# Patient Record
Sex: FEMALE | Race: BLACK OR AFRICAN AMERICAN | Employment: UNEMPLOYED | URBAN - METROPOLITAN AREA
[De-identification: names, ages, dates, MRNs, and addresses within clinical notes are randomized per-mention and may not be internally consistent; named-entity substitution may affect disease eponyms.]

---

## 2017-06-13 ENCOUNTER — HOSPITAL ENCOUNTER (EMERGENCY)
Age: 52
Discharge: HOME | End: 2017-06-13
Attending: EMERGENCY MEDICINE
Payer: MEDICAID

## 2017-06-13 VITALS
TEMPERATURE: 98.3 F | WEIGHT: 201 LBS | DIASTOLIC BLOOD PRESSURE: 88 MMHG | RESPIRATION RATE: 14 BRPM | HEART RATE: 93 BPM | HEIGHT: 65 IN | SYSTOLIC BLOOD PRESSURE: 115 MMHG | OXYGEN SATURATION: 98 % | BODY MASS INDEX: 33.49 KG/M2

## 2017-06-13 DIAGNOSIS — R31.9 URINARY TRACT INFECTION WITH HEMATURIA, SITE UNSPECIFIED: Primary | ICD-10-CM

## 2017-06-13 DIAGNOSIS — N39.0 URINARY TRACT INFECTION WITH HEMATURIA, SITE UNSPECIFIED: Primary | ICD-10-CM

## 2017-06-13 LAB
APPEARANCE UR: ABNORMAL
BACTERIA URNS QL MICRO: ABNORMAL /HPF
BILIRUB UR QL CFM: NEGATIVE
CLUE CELLS VAG QL WET PREP: NORMAL
COLOR UR: ABNORMAL
EPITH CASTS URNS QL MICRO: ABNORMAL /LPF
GLUCOSE UR STRIP.AUTO-MCNC: NEGATIVE MG/DL
HGB UR QL STRIP: ABNORMAL
KETONES UR QL STRIP.AUTO: ABNORMAL MG/DL
KOH PREP SPEC: NORMAL
LEUKOCYTE ESTERASE UR QL STRIP.AUTO: ABNORMAL
NITRITE UR QL STRIP.AUTO: NEGATIVE
PH UR STRIP: 5 [PH] (ref 5–8)
PROT UR STRIP-MCNC: 30 MG/DL
RBC #/AREA URNS HPF: ABNORMAL /HPF (ref 0–5)
SERVICE CMNT-IMP: NORMAL
SP GR UR REFRACTOMETRY: 1.03 (ref 1–1.03)
T VAGINALIS VAG QL WET PREP: NORMAL
UA: UC IF INDICATED,UAUC: ABNORMAL
UROBILINOGEN UR QL STRIP.AUTO: 1 EU/DL (ref 0.2–1)
WBC URNS QL MICRO: ABNORMAL /HPF (ref 0–4)

## 2017-06-13 PROCEDURE — 74011250637 HC RX REV CODE- 250/637: Performed by: PHYSICIAN ASSISTANT

## 2017-06-13 PROCEDURE — 99284 EMERGENCY DEPT VISIT MOD MDM: CPT

## 2017-06-13 PROCEDURE — 87210 SMEAR WET MOUNT SALINE/INK: CPT | Performed by: PHYSICIAN ASSISTANT

## 2017-06-13 PROCEDURE — 81001 URINALYSIS AUTO W/SCOPE: CPT | Performed by: EMERGENCY MEDICINE

## 2017-06-13 PROCEDURE — 74011250636 HC RX REV CODE- 250/636: Performed by: PHYSICIAN ASSISTANT

## 2017-06-13 PROCEDURE — 96372 THER/PROPH/DIAG INJ SC/IM: CPT

## 2017-06-13 PROCEDURE — 87491 CHLMYD TRACH DNA AMP PROBE: CPT | Performed by: PHYSICIAN ASSISTANT

## 2017-06-13 PROCEDURE — 87086 URINE CULTURE/COLONY COUNT: CPT | Performed by: EMERGENCY MEDICINE

## 2017-06-13 PROCEDURE — 74011000250 HC RX REV CODE- 250: Performed by: PHYSICIAN ASSISTANT

## 2017-06-13 RX ORDER — CEPHALEXIN 500 MG/1
500 CAPSULE ORAL 2 TIMES DAILY
Qty: 14 CAP | Refills: 0 | Status: SHIPPED | OUTPATIENT
Start: 2017-06-13 | End: 2017-06-20

## 2017-06-13 RX ORDER — DIPHENHYDRAMINE HCL 25 MG
50 CAPSULE ORAL
Qty: 12 CAP | Refills: 0 | Status: SHIPPED | OUTPATIENT
Start: 2017-06-13 | End: 2018-08-31

## 2017-06-13 RX ORDER — DIPHENHYDRAMINE HCL 25 MG
25 CAPSULE ORAL
Status: COMPLETED | OUTPATIENT
Start: 2017-06-13 | End: 2017-06-13

## 2017-06-13 RX ORDER — AZITHROMYCIN 250 MG/1
1000 TABLET, FILM COATED ORAL
Status: COMPLETED | OUTPATIENT
Start: 2017-06-13 | End: 2017-06-13

## 2017-06-13 RX ADMIN — DIPHENHYDRAMINE HYDROCHLORIDE 25 MG: 25 CAPSULE ORAL at 10:47

## 2017-06-13 RX ADMIN — AZITHROMYCIN 1000 MG: 250 TABLET, FILM COATED ORAL at 10:23

## 2017-06-13 RX ADMIN — LIDOCAINE HYDROCHLORIDE 250 MG: 10 INJECTION, SOLUTION EPIDURAL; INFILTRATION; INTRACAUDAL; PERINEURAL at 10:23

## 2017-06-13 NOTE — ED NOTES
Assumed care of patient from triage. Patient alert and oriented x4. Patient reports lower abd pain with flank pain and vaginal discharge x3 days. Patient mentions she has had diarrhea x3 days with nausea. Patient also reports vaginal itching and urinary burning. Patient denies any other complaints at this time. Emergency Department Nursing Plan of Care       The Nursing Plan of Care is developed from the Nursing assessment and Emergency Department Attending provider initial evaluation. The plan of care may be reviewed in the ED Provider note.     The Plan of Care was developed with the following considerations:   Patient / Family readiness to learn indicated by:verbalized understanding  Persons(s) to be included in education: patient  Barriers to Learning/Limitations:No    Signed     Ajit Proctor RN    6/13/2017   9:24 AM

## 2017-06-13 NOTE — ED NOTES
Patient reports generalized itching. Denies any trouble breathing or swallowing. Provider aware. No rash or hives noted.

## 2017-06-13 NOTE — ED NOTES
Patient states \"Im a heroin addict\". States she has been trying to stop on her own and thinks symptoms are related.  Last use this AM.

## 2017-06-13 NOTE — ED PROVIDER NOTES
HPI Comments: Humble Dixon is a 47 y.o. female w/ hx significant for heroin abuse, bipolar disorder, depression, arthritis, and acid reflux who presents ambulatory to the ED c/o Rt flank pain and suprapubic pain x 2 days w/ dysuria, brown vaginal discharge, chills, diarrhea and nausea. Pt additionally endorses attempting to quit heroin x 4 days but last used this morning after 4 days of being clean. Pt states she has been trying to get in to a program but it is taking a while. Pt additionally endorses typically using heroin every day. Pt specifically denies current nausea, vomiting, fevers, vaginal bleeding, SOB, chest pain. PCP: Omar Saunders MD    There are no other complaints, changes or physical findings at this time. Patient is a 47 y.o. female presenting with flank pain. The history is provided by the patient. Flank Pain    This is a new problem. The current episode started 2 days ago. The problem has not changed since onset. The problem occurs constantly. Patient reports not work related injury. The pain is associated with no known injury. The pain is present in the right side. The pain radiates to the right thigh. The pain is at a severity of 10/10. Associated symptoms include abdominal pain and dysuria. Pertinent negatives include no chest pain, no fever, no numbness, no weight loss, no headaches, no abdominal swelling, no bowel incontinence, no perianal numbness, no bladder incontinence, no pelvic pain, no leg pain, no paresthesias, no paresis, no tingling and no weakness. She has tried nothing for the symptoms.         Past Medical History:   Diagnosis Date    Acid reflux     Arthritis     COPD     Depression     Other ill-defined conditions     Chronic Back Pain    Psychiatric disorder     bipolar       Past Surgical History:   Procedure Laterality Date    ABDOMEN SURGERY PROC UNLISTED      Cholecystectomy    HX CHOLECYSTECTOMY      HX GYN      oophorectomy, left    HX ORTHOPAEDIC      Fracture repair/knee         History reviewed. No pertinent family history. Social History     Social History    Marital status:      Spouse name: N/A    Number of children: N/A    Years of education: N/A     Occupational History    Not on file. Social History Main Topics    Smoking status: Current Every Day Smoker     Packs/day: 0.50     Types: Cigarettes     Last attempt to quit: 7/4/2014    Smokeless tobacco: Never Used    Alcohol use Yes      Comment: socially once a month    Drug use: Yes     Special: Heroin      Comment: 1x a day    Sexual activity: Not on file     Other Topics Concern    Not on file     Social History Narrative         ALLERGIES: Latex and Sulfa (sulfonamide antibiotics)    Review of Systems   Constitutional: Positive for chills. Negative for activity change, fatigue, fever and weight loss. HENT: Negative. Respiratory: Negative. Negative for cough and shortness of breath. Cardiovascular: Negative. Negative for chest pain. Gastrointestinal: Positive for abdominal pain and nausea (not currently. ). Negative for bowel incontinence, constipation, diarrhea and vomiting. Genitourinary: Positive for dysuria, flank pain and vaginal discharge. Negative for bladder incontinence, difficulty urinating, frequency, hematuria, pelvic pain, urgency, vaginal bleeding and vaginal pain. Musculoskeletal: Positive for arthralgias (Rt hip pain. ). Negative for back pain and joint swelling. Skin: Negative. Negative for rash. Neurological: Negative. Negative for tingling, syncope, weakness, numbness, headaches and paresthesias. Psychiatric/Behavioral: Negative. Vitals:    06/13/17 0907   BP: 115/88   Pulse: 93   Resp: 14   Temp: 98.3 °F (36.8 °C)   SpO2: 97%   Weight: 91.2 kg (201 lb)   Height: 5' 5\" (1.651 m)            Physical Exam   Constitutional: She is oriented to person, place, and time. She appears well-developed and well-nourished.  No distress. HENT:   Head: Normocephalic and atraumatic. Right Ear: Hearing and external ear normal.   Left Ear: Hearing and external ear normal.   Nose: Nose normal.   Eyes: Conjunctivae and EOM are normal. Pupils are equal, round, and reactive to light. Neck: Normal range of motion. Cardiovascular: Normal rate, regular rhythm, normal heart sounds and intact distal pulses. Pulmonary/Chest: Effort normal and breath sounds normal. No respiratory distress. She has no wheezes. She has no rales. She exhibits no tenderness. Abdominal: Soft. Bowel sounds are normal. There is no tenderness. There is no rigidity, no rebound, no guarding, no CVA tenderness, no tenderness at McBurney's point and negative Izquierdo's sign. Hernia confirmed negative in the right inguinal area and confirmed negative in the left inguinal area. Genitourinary: Uterus normal. Pelvic exam was performed with patient supine. No labial fusion. There is no rash, tenderness, lesion or injury on the right labia. There is no rash, tenderness, lesion or injury on the left labia. Cervix exhibits no motion tenderness, no discharge and no friability. Right adnexum displays no mass, no tenderness and no fullness. Left adnexum displays no mass, no tenderness and no fullness. No erythema, tenderness or bleeding in the vagina. No foreign body in the vagina. No signs of injury around the vagina. Vaginal discharge found. Musculoskeletal: Normal range of motion. Lymphadenopathy:        Right: No inguinal adenopathy present. Left: No inguinal adenopathy present. Neurological: She is alert and oriented to person, place, and time. No cranial nerve deficit. Skin: Skin is warm and dry. She is not diaphoretic. Psychiatric: She has a normal mood and affect. Her behavior is normal. Judgment and thought content normal.   Nursing note and vitals reviewed.        MDM  Number of Diagnoses or Management Options  Urinary tract infection with hematuria, site unspecified:   Diagnosis management comments: DDx: UTI, pyelo, nephrolithiasis, STI/ PID, BV, vaginal candidiasis, heroin abuse/ withdrawal    LABORATORY TESTS:  Recent Results (from the past 12 hour(s))  -URINALYSIS W/ REFLEX CULTURE  Collection Time: 06/13/17  9:31 AM       Result                                            Value                         Ref Range                       Color                                             DARK YELLOW                                                   Appearance                                        CLOUDY (A)                    CLEAR                           Specific gravity                                  1.030                         1.003 - 1.030                   pH (UA)                                           5.0                           5.0 - 8.0                       Protein                                           30 (A)                        NEG mg/dL                       Glucose                                           NEGATIVE                      NEG mg/dL                       Ketone                                            TRACE (A)                     NEG mg/dL                       Blood                                             MODERATE (A)                  NEG                             Urobilinogen                                      1.0                           0.2 - 1.0 EU/dL                 Nitrites                                          NEGATIVE                      NEG                             Leukocyte Esterase                                MODERATE (A)                  NEG                             WBC                                               10-20                         0 - 4 /hpf                      RBC                                               5-10                          0 - 5 /hpf                      Epithelial cells                                  MODERATE (A)                  FEW /lpf Bacteria                                          1+ (A)                        NEG /hpf                        UA:UC IF INDICATED                                URINE CULTURE ORDERED (A)     CNI                        -BILIRUBIN, CONFIRM  Collection Time: 06/13/17  9:31 AM       Result                                            Value                         Ref Range                       Bilirubin UA, confirm                             NEGATIVE                      NEG                        -WET PREP  Collection Time: 06/13/17 10:15 AM       Result                                            Value                         Ref Range                       Clue cells                                        CLUE CELLS ABSENT                                             Wet prep                                          NO TRICHOMONAS SEEN                                      -KOH, OTHER SOURCES  Collection Time: 06/13/17 10:15 AM       Result                                            Value                         Ref Range                       Special Requests:                                 NO SPECIAL REQUESTS                                           LEIF                                               NO YEAST SEEN                                              IMAGING RESULTS:  No orders to display    MEDICATIONS GIVEN:  Medications  diphenhydrAMINE (BENADRYL) capsule 25 mg (not administered)  azithromycin (ZITHROMAX) tablet 1,000 mg (1,000 mg Oral Given 6/13/17 1023)  cefTRIAXone (ROCEPHIN) 250 mg in lidocaine (PF) (XYLOCAINE) 10 mg/mL (1 %) IM injection (250 mg IntraMUSCular Given 6/13/17 1023)    IMPRESSION:  Urinary tract infection with hematuria, site unspecified  (primary encounter diagnosis)    PLAN:  1. Current Discharge Medication List    START taking these medications    diphenhydrAMINE (BENADRYL) 25 mg capsule  Take 2 Caps by mouth every six (6) hours as needed.   Qty: 12 Cap Refills: 0    cephALEXin (KEFLEX) 500 mg capsule  Take 1 Cap by mouth two (2) times a day for 7 days. Qty: 14 Cap Refills: 0      CONTINUE these medications which have NOT CHANGED    TRAZODONE HCL (TRAZODONE PO)  Take  by mouth. sertraline (ZOLOFT) 100 mg tablet  Take  by mouth daily. meloxicam (MOBIC) 15 mg tablet  Take 15 mg by mouth daily. pantoprazole (PROTONIX) 40 mg tablet  Take 40 mg by mouth daily. benztropine (COGENTIN) 0.5 mg tablet  Take 0.5 mg by mouth two (2) times a day. pravastatin (PRAVACHOL) 10 mg tablet  Take 10 mg by mouth nightly. ALPRAZolam (XANAX) 1 mg tablet  Take 1 mg by mouth. Cholecalciferol, Vitamin D3, 50,000 unit cap  Take  by mouth.    zolpidem (AMBIEN) 5 mg tablet  Take 5 mg by mouth nightly as needed for Sleep.    mirtazapine (REMERON) 15 mg tablet  Take 15 mg by mouth nightly. risperiDONE (RISPERDAL) 2 mg tablet  Take  by mouth. TIOTROPIUM BROMIDE (SPIRIVA WITH HANDIHALER IN)  Take  by inhalation. 2. Follow-up Information     Follow up With Details Comments Contact Nirali Gibbons MD Schedule an appointment as soon as possible for a   visit in 1 week As needed, If symptoms worsen [id number] [place] [address]  [phone]        Return to ED if worse                  Amount and/or Complexity of Data Reviewed  Clinical lab tests: ordered and reviewed  Tests in the medicine section of CPT®: ordered and reviewed    Patient Progress  Patient progress: stable    ED Course       Procedures    10:42 AM  I have discussed with patient their diagnosis, treatment, and follow up plan. The patient agrees to follow up as outlined in discharge paperwork and also to return to the ED with any worsening.  Missy Naylor PA-C

## 2017-06-13 NOTE — DISCHARGE INSTRUCTIONS
Urinary Tract Infection in Women: Care Instructions  Your Care Instructions    A urinary tract infection, or UTI, is a general term for an infection anywhere between the kidneys and the urethra (where urine comes out). Most UTIs are bladder infections. They often cause pain or burning when you urinate. UTIs are caused by bacteria and can be cured with antibiotics. Be sure to complete your treatment so that the infection goes away. Follow-up care is a key part of your treatment and safety. Be sure to make and go to all appointments, and call your doctor if you are having problems. It's also a good idea to know your test results and keep a list of the medicines you take. How can you care for yourself at home? · Take your antibiotics as directed. Do not stop taking them just because you feel better. You need to take the full course of antibiotics. · Drink extra water and other fluids for the next day or two. This may help wash out the bacteria that are causing the infection. (If you have kidney, heart, or liver disease and have to limit fluids, talk with your doctor before you increase your fluid intake.)  · Avoid drinks that are carbonated or have caffeine. They can irritate the bladder. · Urinate often. Try to empty your bladder each time. · To relieve pain, take a hot bath or lay a heating pad set on low over your lower belly or genital area. Never go to sleep with a heating pad in place. To prevent UTIs  · Drink plenty of water each day. This helps you urinate often, which clears bacteria from your system. (If you have kidney, heart, or liver disease and have to limit fluids, talk with your doctor before you increase your fluid intake.)  · Urinate when you need to. · Urinate right after you have sex. · Change sanitary pads often. · Avoid douches, bubble baths, feminine hygiene sprays, and other feminine hygiene products that have deodorants.   · After going to the bathroom, wipe from front to back.  When should you call for help? Call your doctor now or seek immediate medical care if:  · Symptoms such as fever, chills, nausea, or vomiting get worse or appear for the first time. · You have new pain in your back just below your rib cage. This is called flank pain. · There is new blood or pus in your urine. · You have any problems with your antibiotic medicine. Watch closely for changes in your health, and be sure to contact your doctor if:  · You are not getting better after taking an antibiotic for 2 days. · Your symptoms go away but then come back. Where can you learn more? Go to http://salvador-ricky.info/. Enter Q531 in the search box to learn more about \"Urinary Tract Infection in Women: Care Instructions. \"  Current as of: November 28, 2016  Content Version: 11.2  © 4163-1096 [employer], [employer]. Care instructions adapted under license by [employer] (which disclaims liability or warranty for this information). If you have questions about a medical condition or this instruction, always ask your healthcare professional. [address] any warranty or liability for your use of this information.

## 2017-06-14 LAB
BACTERIA SPEC CULT: NORMAL
C TRACH DNA SPEC QL NAA+PROBE: NEGATIVE
CC UR VC: NORMAL
N GONORRHOEA DNA SPEC QL NAA+PROBE: NEGATIVE
SAMPLE TYPE: NORMAL
SERVICE CMNT-IMP: NORMAL
SERVICE CMNT-IMP: NORMAL
SPECIMEN SOURCE: NORMAL

## 2017-09-17 ENCOUNTER — HOSPITAL ENCOUNTER (EMERGENCY)
Age: 52
Discharge: HOME | End: 2017-09-17
Attending: EMERGENCY MEDICINE
Payer: MEDICAID

## 2017-09-17 ENCOUNTER — APPOINTMENT (OUTPATIENT)
Dept: GENERAL RADIOLOGY | Age: 52
End: 2017-09-17
Attending: EMERGENCY MEDICINE
Payer: MEDICAID

## 2017-09-17 VITALS
BODY MASS INDEX: 34.99 KG/M2 | RESPIRATION RATE: 16 BRPM | TEMPERATURE: 97.8 F | WEIGHT: 210 LBS | OXYGEN SATURATION: 99 % | SYSTOLIC BLOOD PRESSURE: 131 MMHG | DIASTOLIC BLOOD PRESSURE: 80 MMHG | HEART RATE: 60 BPM | HEIGHT: 65 IN

## 2017-09-17 DIAGNOSIS — J42 CHRONIC BRONCHITIS, UNSPECIFIED CHRONIC BRONCHITIS TYPE (HCC): Primary | ICD-10-CM

## 2017-09-17 LAB
D DIMER PPP FEU-MCNC: 0.4 MG/L FEU (ref 0–0.65)
TROPONIN I BLD-MCNC: <0.04 NG/ML (ref 0–0.08)

## 2017-09-17 PROCEDURE — 99283 EMERGENCY DEPT VISIT LOW MDM: CPT

## 2017-09-17 PROCEDURE — 84484 ASSAY OF TROPONIN QUANT: CPT

## 2017-09-17 PROCEDURE — 71020 XR CHEST PA LAT: CPT

## 2017-09-17 PROCEDURE — 36415 COLL VENOUS BLD VENIPUNCTURE: CPT | Performed by: EMERGENCY MEDICINE

## 2017-09-17 PROCEDURE — 85379 FIBRIN DEGRADATION QUANT: CPT | Performed by: EMERGENCY MEDICINE

## 2017-09-17 PROCEDURE — 93005 ELECTROCARDIOGRAM TRACING: CPT

## 2017-09-17 RX ORDER — ALBUTEROL SULFATE 90 UG/1
2 AEROSOL, METERED RESPIRATORY (INHALATION)
Qty: 1 INHALER | Refills: 0 | Status: SHIPPED | OUTPATIENT
Start: 2017-09-17 | End: 2018-08-31

## 2017-09-17 RX ORDER — BENZONATATE 100 MG/1
100 CAPSULE ORAL
Qty: 30 CAP | Refills: 0 | Status: SHIPPED | OUTPATIENT
Start: 2017-09-17 | End: 2017-09-24

## 2017-09-17 RX ORDER — AZITHROMYCIN 250 MG/1
TABLET, FILM COATED ORAL
Qty: 6 TAB | Refills: 0 | Status: SHIPPED | OUTPATIENT
Start: 2017-09-17 | End: 2018-07-10 | Stop reason: CLARIF

## 2017-09-17 RX ORDER — PREDNISONE 20 MG/1
60 TABLET ORAL DAILY
Qty: 15 TAB | Refills: 0 | Status: SHIPPED | OUTPATIENT
Start: 2017-09-17 | End: 2017-09-22

## 2017-09-17 NOTE — ED NOTES
Pt accepted D/C data and med's. Pt decline W/C for D/C. PIV removed intact. Patient (s)  given copy of dc instructions and 4 script(s). Patient (s)  verbalized understanding of instructions and script (s). Patient given a current medication reconciliation form and verbalized understanding of their medications. Patient (s) verbalized understanding of the importance of discussing medications with  his or her physician or clinic they will be following up with. Patient alert and oriented and in no acute distress. Patient discharged home ambulatory with self.

## 2017-09-17 NOTE — DISCHARGE INSTRUCTIONS
Bronchitis: Care Instructions  Your Care Instructions    Bronchitis is inflammation of the bronchial tubes, which carry air to the lungs. The tubes swell and produce mucus, or phlegm. The mucus and inflamed bronchial tubes make you cough. You may have trouble breathing. Most cases of bronchitis are caused by viruses like those that cause colds. Antibiotics usually do not help and they may be harmful. Bronchitis usually develops rapidly and lasts about 2 to 3 weeks in otherwise healthy people. Follow-up care is a key part of your treatment and safety. Be sure to make and go to all appointments, and call your doctor if you are having problems. It's also a good idea to know your test results and keep a list of the medicines you take. How can you care for yourself at home? · Take all medicines exactly as prescribed. Call your doctor if you think you are having a problem with your medicine. · Get some extra rest.  · Take an over-the-counter pain medicine, such as acetaminophen (Tylenol), ibuprofen (Advil, Motrin), or naproxen (Aleve) to reduce fever and relieve body aches. Read and follow all instructions on the label. · Do not take two or more pain medicines at the same time unless the doctor told you to. Many pain medicines have acetaminophen, which is Tylenol. Too much acetaminophen (Tylenol) can be harmful. · Take an over-the-counter cough medicine that contains dextromethorphan to help quiet a dry, hacking cough so that you can sleep. Avoid cough medicines that have more than one active ingredient. Read and follow all instructions on the label. · Breathe moist air from a humidifier, hot shower, or sink filled with hot water. The heat and moisture will thin mucus so you can cough it out. · Do not smoke. Smoking can make bronchitis worse. If you need help quitting, talk to your doctor about stop-smoking programs and medicines. These can increase your chances of quitting for good.   When should you call for help? Call 911 anytime you think you may need emergency care. For example, call if:  · You have severe trouble breathing. Call your doctor now or seek immediate medical care if:  · You have new or worse trouble breathing. · You cough up dark brown or bloody mucus (sputum). · You have a new or higher fever. · You have a new rash. Watch closely for changes in your health, and be sure to contact your doctor if:  · You cough more deeply or more often, especially if you notice more mucus or a change in the color of your mucus. · You are not getting better as expected. Where can you learn more? Go to http://salvador-ricky.info/. Enter H333 in the search box to learn more about \"Bronchitis: Care Instructions. \"  Current as of: March 25, 2017  Content Version: 11.3  © 2113-9333 [employer]. Care instructions adapted under license by [employer] (which disclaims liability or warranty for this information). If you have questions about a medical condition or this instruction, always ask your healthcare professional. [address] any warranty or liability for your use of this information.

## 2017-09-17 NOTE — ED NOTES
Pt c/o CP and generalize body aches. x 3 days          Emergency Department Nursing Plan of Care       The Nursing Plan of Care is developed from the Nursing assessment and Emergency Department Attending provider initial evaluation. The plan of care may be reviewed in the ED Provider note.     The Plan of Care was developed with the following considerations:   Patient / Family readiness to learn indicated by:verbalized understanding  Persons(s) to be included in education: patient  Barriers to Learning/Limitations:No    Signed     Naina Lott RN    9/17/2017   11:47 AM

## 2017-09-17 NOTE — ED PROVIDER NOTES
HPI Comments: Ladarius Reinoso is a 47 y.o. female with PMHx significant for COPD, Arthritis, bipolar, depression who presents ambulatory to [facility] ED with cc of acute onset productive cough x 3 days with associated CP. Pt has had similar CP in the past with the cough and she has been seen by a physician in the past for the same complaint and her workups have been negative. Pt does note that her CP started after the onset of her cough. She does have an inhaler at home which she has used without relief. Pt specifically denies any SOB, fever, nausea, vomiting. PCP: Alisha Hensley MD    Social Hx: + tobacco (0.5 ppd), +EtOH (socially), + illicit drugs (heroin 1x a day). There are no other complaints, changes or physical findings at this time. The history is provided by the patient. No  was used. Past Medical History:   Diagnosis Date    Acid reflux     Arthritis     COPD     Depression     Other ill-defined conditions     Chronic Back Pain    Psychiatric disorder     bipolar       Past Surgical History:   Procedure Laterality Date    ABDOMEN SURGERY PROC UNLISTED      Cholecystectomy    HX CHOLECYSTECTOMY      HX GYN      oophorectomy, left    HX ORTHOPAEDIC      Fracture repair/knee         No family history on file. Social History     Social History    Marital status:      Spouse name: N/A    Number of children: N/A    Years of education: N/A     Occupational History    Not on file.      Social History Main Topics    Smoking status: Current Every Day Smoker     Packs/day: 0.50     Types: Cigarettes     Last attempt to quit: 7/4/2014    Smokeless tobacco: Never Used    Alcohol use Yes      Comment: socially once a month    Drug use: Yes     Special: Heroin      Comment: 1x a day    Sexual activity: Not on file     Other Topics Concern    Not on file     Social History Narrative         ALLERGIES: Latex and Sulfa (sulfonamide antibiotics)    Review of Systems   Constitutional: Negative for chills and fever. HENT: Negative for congestion, rhinorrhea, sneezing and sore throat. Eyes: Negative for redness and visual disturbance. Respiratory: Positive for cough. Negative for shortness of breath. Cardiovascular: Positive for chest pain. Negative for leg swelling. Gastrointestinal: Negative for abdominal pain, nausea and vomiting. Genitourinary: Negative for difficulty urinating and frequency. Musculoskeletal: Negative for back pain, myalgias and neck stiffness. Skin: Negative for rash. Neurological: Negative for dizziness, syncope, weakness and headaches. Hematological: Negative for adenopathy. Vitals:    09/17/17 1110   BP: 131/80   Pulse: 60   Resp: 16   Temp: 97.8 °F (36.6 °C)   SpO2: 99%   Weight: 95.3 kg (210 lb)   Height: 5' 5\" (1.651 m)            Physical Exam   Constitutional: She is oriented to person, place, and time. She appears well-developed and well-nourished. HENT:   Head: Normocephalic. Mouth/Throat: Oropharynx is clear and moist.   Eyes: Conjunctivae and EOM are normal. Pupils are equal, round, and reactive to light. Neck: Normal range of motion. Neck supple. Cardiovascular: Normal rate, regular rhythm, normal heart sounds and intact distal pulses. Pulmonary/Chest: Effort normal and breath sounds normal. No respiratory distress. She has no wheezes. She has no rales. Abdominal: Soft. Bowel sounds are normal. She exhibits no distension. There is no rebound. Musculoskeletal: Normal range of motion. She exhibits no edema or deformity. Neurological: She is alert and oriented to person, place, and time. Skin: Skin is warm and dry. Psychiatric: She has a normal mood and affect. Her behavior is normal. Judgment and thought content normal.        MDM  Number of Diagnoses or Management Options  Diagnosis management comments: DDx: URI, Bronchitis, Pneumonia.        Amount and/or Complexity of Data Reviewed  Clinical lab tests: ordered and reviewed  Tests in the radiology section of CPT®: ordered and reviewed  Tests in the medicine section of CPT®: ordered and reviewed  Review and summarize past medical records: yes  Independent visualization of images, tracings, or specimens: yes    Patient Progress  Patient progress: stable    ED Course       Procedures    PROGRESS NOTE:  11:18 AM  Will add a D-dimer and troponin since pt's previous workups were negative, will inform the pt. Pt updated on plan and she does report a DVT in the past  Written by BRITNEY Masterson, as dictated by Spring Muniz MD.    PROGRESS NOTE:  11:54 AM  Per chart review pt has the risk factors to have syncopal episodes per cardiology (Dr. Esther Ball) note on 4/20/17. Pt does have dilated cardiomyopathy and CHF. Written by BRITNEY Masterson, as dictated by Spring Muniz MD.    LABORATORY TESTS:  Recent Results (from the past 12 hour(s))   D DIMER    Collection Time: 09/17/17 11:48 AM   Result Value Ref Range    D-dimer 0.40 0.00 - 0.65 mg/L FEU   POC TROPONIN-I    Collection Time: 09/17/17 11:51 AM   Result Value Ref Range    Troponin-I (POC) <0.04 0.00 - 0.08 ng/mL       IMAGING RESULTS:  XR CHEST PA LAT   Final Result   CXR Results  (Last 48 hours)               09/17/17 1133  XR CHEST PA LAT Final result    Impression:  IMPRESSION:    1. Clear lungs. 2. If the patient meets criteria when she turns 55, annual lung carcinoma   screening with low-dose chest CT is recommended. Criteria for lung carcinoma screening with low-dose CT:   3 50-69 years old. Note: The ACR recommends screening to 68years old, though   the [place] Preventative Services Task Force recommends screening to [de-identified]years old. 2. No signs or symptoms of lung carcinoma. 3. 30 pack-year or greater smoking history. 4. Current smoker or quit within the last 15 years.    5. Written order from a health professional following lung cancer screening   counseling that attests to shared decision-making having taken place before   their first screening CT.       www.acr.org/Quality-Safety/Resources/LungRADS   www. USpreventativeservicestaskforce.org/page/document/updatesummaryfinal/lung-ca       cer-screening            Narrative:  PA AND LATERAL CHEST RADIOGRAPHS: 9/17/2017 11:33 AM       INDICATION: Cough, smoker. History of COPD. Chest pain and generalized bodyaches   for 3 days. Productive cough with nasal congestion. No fever. COMPARISON: 11/15/2016, 7/4/2016, 4/9/2006. TECHNIQUE: Frontal and lateral radiographs of the chest.       FINDINGS:   The lungs are clear. The central airways are patent. The heart size is normal.   No pneumothorax or pleural effusion. MEDICATIONS GIVEN:  Medications - No data to display    IMPRESSION:  1. Chronic bronchitis, unspecified chronic bronchitis type ([facility] [address]        PLAN:  1. Current Discharge Medication List      START taking these medications    Details   azithromycin (ZITHROMAX Z-ERIKA) 250 mg tablet Take two tablets today then one tablet daily  Qty: 6 Tab, Refills: 0      albuterol (PROVENTIL HFA, VENTOLIN HFA, PROAIR HFA) 90 mcg/actuation inhaler Take 2 Puffs by inhalation every four (4) hours as needed for Wheezing. Qty: 1 Inhaler, Refills: 0      predniSONE (DELTASONE) 20 mg tablet Take 3 Tabs by mouth daily for 5 days. Qty: 15 Tab, Refills: 0      benzonatate (TESSALON PERLES) 100 mg capsule Take 1 Cap by mouth three (3) times daily as needed for Cough for up to 7 days. Qty: 30 Cap, Refills: 0           2.    Follow-up Information     Follow up With Details Comments Contact Info    Lauryn Floyd MD   Patient can only remember the practice name and not the physician      [facility] EMERGENCY DEPT  As needed, If symptoms worsen [place]  [phone]        Return to ED if worse     DISCHARGE NOTE  12:42 PM  The patient has been re-evaluated and is ready for discharge. Reviewed available results with patient. Counseled pt on diagnosis and care plan. Pt has expressed understanding, and all questions have been answered. Pt agrees with plan and agrees to F/U as recommended, or return to the ED if their sxs worsen. Discharge instructions have been provided and explained to the pt, along with reasons to return to the ED. This note is prepared by Mindy De La Cruz acting as Scribe for MD Simone Green MD : The scribe's documentation has been prepared under my direction and personally reviewed by me in its entirety. I confirm that the note above accurately reflects all work, treatment, procedures, and medical decision making performed by me.

## 2017-09-17 NOTE — ED TRIAGE NOTES
C/o prod cough with nasal congestion, gen body aches, including chest soreness and back, x 3 days, denies fever/injury

## 2017-09-18 LAB
ATRIAL RATE: 56 BPM
CALCULATED P AXIS, ECG09: 58 DEGREES
CALCULATED R AXIS, ECG10: 4 DEGREES
CALCULATED T AXIS, ECG11: 5 DEGREES
DIAGNOSIS, 93000: NORMAL
P-R INTERVAL, ECG05: 118 MS
Q-T INTERVAL, ECG07: 416 MS
QRS DURATION, ECG06: 70 MS
QTC CALCULATION (BEZET), ECG08: 401 MS
VENTRICULAR RATE, ECG03: 56 BPM

## 2017-10-22 ENCOUNTER — HOSPITAL ENCOUNTER (EMERGENCY)
Age: 52
Discharge: HOME | End: 2017-10-22
Attending: EMERGENCY MEDICINE | Admitting: EMERGENCY MEDICINE
Payer: MEDICAID

## 2017-10-22 ENCOUNTER — APPOINTMENT (OUTPATIENT)
Dept: CT IMAGING | Age: 52
End: 2017-10-22
Attending: EMERGENCY MEDICINE
Payer: MEDICAID

## 2017-10-22 VITALS
RESPIRATION RATE: 14 BRPM | BODY MASS INDEX: 34.72 KG/M2 | WEIGHT: 208.4 LBS | HEIGHT: 65 IN | DIASTOLIC BLOOD PRESSURE: 77 MMHG | OXYGEN SATURATION: 99 % | HEART RATE: 83 BPM | SYSTOLIC BLOOD PRESSURE: 114 MMHG | TEMPERATURE: 98.4 F

## 2017-10-22 DIAGNOSIS — N20.1 LEFT URETERAL STONE: Primary | ICD-10-CM

## 2017-10-22 LAB
ALBUMIN SERPL-MCNC: 3.6 G/DL (ref 3.5–5)
ALBUMIN/GLOB SERPL: 1 {RATIO} (ref 1.1–2.2)
ALP SERPL-CCNC: 85 U/L (ref 45–117)
ALT SERPL-CCNC: 36 U/L (ref 12–78)
ANION GAP SERPL CALC-SCNC: 8 MMOL/L (ref 5–15)
APPEARANCE UR: ABNORMAL
AST SERPL-CCNC: 26 U/L (ref 15–37)
BACTERIA URNS QL MICRO: NEGATIVE /HPF
BASOPHILS # BLD: 0 K/UL (ref 0–0.1)
BASOPHILS NFR BLD: 0 % (ref 0–1)
BILIRUB SERPL-MCNC: 0.3 MG/DL (ref 0.2–1)
BILIRUB UR QL CFM: NEGATIVE
BUN SERPL-MCNC: 12 MG/DL (ref 6–20)
BUN/CREAT SERPL: 13 (ref 12–20)
CALCIUM SERPL-MCNC: 8.8 MG/DL (ref 8.5–10.1)
CHLORIDE SERPL-SCNC: 102 MMOL/L (ref 97–108)
CO2 SERPL-SCNC: 28 MMOL/L (ref 21–32)
COLOR UR: ABNORMAL
CREAT SERPL-MCNC: 0.91 MG/DL (ref 0.55–1.02)
EOSINOPHIL # BLD: 0.6 K/UL (ref 0–0.4)
EOSINOPHIL NFR BLD: 6 % (ref 0–7)
EPITH CASTS URNS QL MICRO: ABNORMAL /LPF
ERYTHROCYTE [DISTWIDTH] IN BLOOD BY AUTOMATED COUNT: 14.2 % (ref 11.5–14.5)
GLOBULIN SER CALC-MCNC: 3.7 G/DL (ref 2–4)
GLUCOSE SERPL-MCNC: 147 MG/DL (ref 65–100)
GLUCOSE UR STRIP.AUTO-MCNC: NEGATIVE MG/DL
HCT VFR BLD AUTO: 41.7 % (ref 35–47)
HGB BLD-MCNC: 13.5 G/DL (ref 11.5–16)
HGB UR QL STRIP: ABNORMAL
KETONES UR QL STRIP.AUTO: NEGATIVE MG/DL
LEUKOCYTE ESTERASE UR QL STRIP.AUTO: ABNORMAL
LYMPHOCYTES # BLD: 2.8 K/UL (ref 0.8–3.5)
LYMPHOCYTES NFR BLD: 29 % (ref 12–49)
MCH RBC QN AUTO: 31.2 PG (ref 26–34)
MCHC RBC AUTO-ENTMCNC: 32.4 G/DL (ref 30–36.5)
MCV RBC AUTO: 96.3 FL (ref 80–99)
MONOCYTES # BLD: 0.7 K/UL (ref 0–1)
MONOCYTES NFR BLD: 7 % (ref 5–13)
NEUTS SEG # BLD: 5.7 K/UL (ref 1.8–8)
NEUTS SEG NFR BLD: 58 % (ref 32–75)
NITRITE UR QL STRIP.AUTO: NEGATIVE
PH UR STRIP: 5.5 [PH] (ref 5–8)
PLATELET # BLD AUTO: 230 K/UL (ref 150–400)
POTASSIUM SERPL-SCNC: 3.5 MMOL/L (ref 3.5–5.1)
PROT SERPL-MCNC: 7.3 G/DL (ref 6.4–8.2)
PROT UR STRIP-MCNC: 30 MG/DL
RBC # BLD AUTO: 4.33 M/UL (ref 3.8–5.2)
RBC #/AREA URNS HPF: ABNORMAL /HPF (ref 0–5)
SODIUM SERPL-SCNC: 138 MMOL/L (ref 136–145)
SP GR UR REFRACTOMETRY: 1.02 (ref 1–1.03)
UA: UC IF INDICATED,UAUC: ABNORMAL
UROBILINOGEN UR QL STRIP.AUTO: 0.2 EU/DL (ref 0.2–1)
WBC # BLD AUTO: 9.8 K/UL (ref 3.6–11)
WBC URNS QL MICRO: ABNORMAL /HPF (ref 0–4)

## 2017-10-22 PROCEDURE — 81001 URINALYSIS AUTO W/SCOPE: CPT | Performed by: EMERGENCY MEDICINE

## 2017-10-22 PROCEDURE — 80053 COMPREHEN METABOLIC PANEL: CPT | Performed by: EMERGENCY MEDICINE

## 2017-10-22 PROCEDURE — 96361 HYDRATE IV INFUSION ADD-ON: CPT

## 2017-10-22 PROCEDURE — 99284 EMERGENCY DEPT VISIT MOD MDM: CPT

## 2017-10-22 PROCEDURE — 96374 THER/PROPH/DIAG INJ IV PUSH: CPT

## 2017-10-22 PROCEDURE — 74176 CT ABD & PELVIS W/O CONTRAST: CPT

## 2017-10-22 PROCEDURE — 85025 COMPLETE CBC W/AUTO DIFF WBC: CPT | Performed by: EMERGENCY MEDICINE

## 2017-10-22 PROCEDURE — 96375 TX/PRO/DX INJ NEW DRUG ADDON: CPT

## 2017-10-22 PROCEDURE — 74011250636 HC RX REV CODE- 250/636: Performed by: EMERGENCY MEDICINE

## 2017-10-22 PROCEDURE — 87086 URINE CULTURE/COLONY COUNT: CPT | Performed by: EMERGENCY MEDICINE

## 2017-10-22 PROCEDURE — 36415 COLL VENOUS BLD VENIPUNCTURE: CPT | Performed by: EMERGENCY MEDICINE

## 2017-10-22 RX ORDER — KETOROLAC TROMETHAMINE 30 MG/ML
30 INJECTION, SOLUTION INTRAMUSCULAR; INTRAVENOUS
Status: COMPLETED | OUTPATIENT
Start: 2017-10-22 | End: 2017-10-22

## 2017-10-22 RX ORDER — TAMSULOSIN HYDROCHLORIDE 0.4 MG/1
0.4 CAPSULE ORAL DAILY
Qty: 7 CAP | Refills: 0 | Status: SHIPPED | OUTPATIENT
Start: 2017-10-22 | End: 2017-10-29

## 2017-10-22 RX ORDER — ONDANSETRON 4 MG/1
4 TABLET, ORALLY DISINTEGRATING ORAL
Qty: 10 TAB | Refills: 0 | Status: SHIPPED | OUTPATIENT
Start: 2017-10-22 | End: 2018-08-15

## 2017-10-22 RX ORDER — MORPHINE SULFATE 2 MG/ML
2 INJECTION, SOLUTION INTRAMUSCULAR; INTRAVENOUS
Status: COMPLETED | OUTPATIENT
Start: 2017-10-22 | End: 2017-10-22

## 2017-10-22 RX ORDER — HYDROCODONE BITARTRATE AND ACETAMINOPHEN 5; 325 MG/1; MG/1
1 TABLET ORAL
Qty: 4 TAB | Refills: 0 | Status: SHIPPED | OUTPATIENT
Start: 2017-10-22 | End: 2018-07-10

## 2017-10-22 RX ADMIN — MORPHINE SULFATE 2 MG: 2 INJECTION, SOLUTION INTRAMUSCULAR; INTRAVENOUS at 06:53

## 2017-10-22 RX ADMIN — KETOROLAC TROMETHAMINE 30 MG: 30 INJECTION, SOLUTION INTRAMUSCULAR at 06:53

## 2017-10-22 RX ADMIN — SODIUM CHLORIDE 1000 ML: 900 INJECTION, SOLUTION INTRAVENOUS at 07:01

## 2017-10-22 NOTE — ED NOTES
Pt requesting food-asked to wait until CT scan results return. Pt reported that her abd pain had resolved. No si/s of acute distress. No pt complaints. Call bell within reach.

## 2017-10-22 NOTE — ED NOTES
Pt presents to ED via EMS complaining of left sided abdominal pain x 1 hour. Pt describes the pain as \"labor pains\" stating \"I feel like I'm having a baby. \" Pt reports using heroin \"earlier this evening. \" Pt denies taking anything at home to help the pain. Pt reports some diarrhea earlier this morning. Pt is alert and oriented x 4, RR even and unlabored, skin is warm and dry. Assessment completed and pt updated on plan of care. Emergency Department Nursing Plan of Care       The Nursing Plan of Care is developed from the Nursing assessment and Emergency Department Attending provider initial evaluation. The plan of care may be reviewed in the ED Provider note.     The Plan of Care was developed with the following considerations:   Patient / Family readiness to learn indicated by:verbalized understanding  Persons(s) to be included in education: patient  Barriers to Learning/Limitations:Dana Potter [id number]    10/22/2017   5:57 AM

## 2017-10-22 NOTE — ED PROVIDER NOTES
[facility]  EMERGENCY DEPARTMENT HISTORY AND PHYSICAL EXAM         Date of Service: 10/22/2017   Patient Name: Idris Bhagat   YOB: 1965  Medical Record Number: [id number]    History of Presenting Illness     Chief Complaint   Patient presents with    Abdominal Pain     left sided abd pain x 1 hour        History Provided By:  patient    Additional History:   Idris Bhagat is a 47 y.o. female with PMhx significant for heroin abuse, COPD, Arthritis, bipolar, depression who presents via EMS to the ED with cc of sudden onset LLQ abdominal pain that woke pt from her sleep at 0500 this morning. Pt describes her pain as sharp and comparing it to labor pains, rating it 10/10. Pt denies chance of pregnancy, stating she is no longer having menses. Pt specifically denies fever, chills, nausea, vomiting, difficulty urinating, dysuria or other urinary sx's. Social Hx: + Tobacco, + EtOH, + Illicit Drugs (Heroin)    There are no other complaints, changes or physical findings at this time. Primary Care Provider: Lauryn Floyd MD     Past History     Past Medical History:   Past Medical History:   Diagnosis Date    Acid reflux     Arthritis     COPD     Depression     Other ill-defined conditions(799.89)     Chronic Back Pain    Psychiatric disorder     bipolar        Past Surgical History:   Past Surgical History:   Procedure Laterality Date    ABDOMEN SURGERY PROC UNLISTED      Cholecystectomy    HX CHOLECYSTECTOMY      HX GYN      oophorectomy, left    HX ORTHOPAEDIC      Fracture repair/knee        Family History:   History reviewed. No pertinent family history. Social History:   Social History   Substance Use Topics    Smoking status: Current Every Day Smoker     Packs/day: 0.25     Types: Cigarettes    Smokeless tobacco: Never Used    Alcohol use Yes      Comment: socially once a month        Allergies:    Allergies   Allergen Reactions    Latex Rash    Sulfa (Sulfonamide Antibiotics) Rash        Review of Systems   Review of Systems   Constitutional: Negative for chills and fever. HENT: Positive for congestion. Negative for rhinorrhea. Eyes: Negative for photophobia and discharge. Respiratory: Negative for cough and shortness of breath. Cardiovascular: Negative for chest pain and palpitations. Gastrointestinal: Positive for abdominal pain (LLQ). Negative for nausea and vomiting. Genitourinary: Negative. Negative for difficulty urinating and dysuria. Musculoskeletal: Negative for neck pain. Skin: Negative for rash and wound. Allergic/Immunologic: Negative for immunocompromised state. Neurological: Negative for dizziness and light-headedness. Psychiatric/Behavioral: Negative for suicidal ideas. Physical Exam  Physical Exam   Constitutional: She is oriented to person, place, and time. She appears well-developed and well-nourished. Elevated BMI   HENT:   Head: Normocephalic and atraumatic. Mouth/Throat: Oropharynx is clear and moist.   Eyes: Conjunctivae and EOM are normal.   Neck: Normal range of motion and full passive range of motion without pain. Neck supple. Cardiovascular: Normal rate, regular rhythm, S1 normal, S2 normal, normal heart sounds, intact distal pulses and normal pulses. No murmur heard. Pulmonary/Chest: Effort normal and breath sounds normal. No respiratory distress. She has no wheezes. Abdominal: Soft. Normal appearance and bowel sounds are normal. She exhibits no distension. There is no rebound. Equivocal left CVA tenderness   Musculoskeletal: Normal range of motion. Neurological: She is alert and oriented to person, place, and time. She has normal strength. Skin: Skin is warm, dry and intact. No rash noted. Psychiatric: She has a normal mood and affect. Her speech is normal and behavior is normal. Judgment and thought content normal.   Nursing note and vitals reviewed.       Medical Decision Making   I am the first provider for this patient. I reviewed the vital signs, available nursing notes, past medical history, past surgical history, family history and social history. Provider Notes: DDx: kidney stones, ureteral lithiasis, renal colic, unlikely volvulus, unlikely bowel obstruction, unlikely bowel perforation    ED Course:  6:36 AM   Initial assessment performed. The patients presenting problems have been discussed, and they are in agreement with the care plan formulated and outlined with them. I have encouraged them to ask questions as they arise throughout their visit. 6:38 AM   Pt has been re-evaluated. Notified pt a CT of abdomen will be ordered to r/o kidney stones. Pt states she arrived via EMS and is not driving. Ordered pain medication. Will continue to monitor pt.     6:56 AM   Patient care will be transferred to Haider Burns MD.  Discussed available diagnostic results and care plan at length. Pt made aware of provider change.    Written by Kandy Kam, ED Scribe, as dictated by Clint Carver MD.     Diagnostic Study Results   Labs -      Recent Results (from the past 12 hour(s))   URINALYSIS W/ REFLEX CULTURE    Collection Time: 10/22/17  6:30 AM   Result Value Ref Range    Color DARK YELLOW      Appearance CLOUDY (A) CLEAR      Specific gravity 1.025 1.003 - 1.030      pH (UA) 5.5 5.0 - 8.0      Protein 30 (A) NEG mg/dL    Glucose NEGATIVE  NEG mg/dL    Ketone NEGATIVE  NEG mg/dL    Blood LARGE (A) NEG      Urobilinogen 0.2 0.2 - 1.0 EU/dL    Nitrites NEGATIVE  NEG      Leukocyte Esterase SMALL (A) NEG      Bilirubin UA, confirm NEGATIVE  NEG      WBC 5-10 0 - 4 /hpf    RBC 0-5 0 - 5 /hpf    Epithelial cells FEW FEW /lpf    Bacteria NEGATIVE  NEG /hpf    UA:UC IF INDICATED URINE CULTURE ORDERED (A) CNI     CBC WITH AUTOMATED DIFF    Collection Time: 10/22/17  6:55 AM   Result Value Ref Range    WBC 9.8 3.6 - 11.0 K/uL    RBC 4.33 3.80 - 5.20 M/uL HGB 13.5 11.5 - 16.0 g/dL    HCT 41.7 35.0 - 47.0 %    MCV 96.3 80.0 - 99.0 FL    MCH 31.2 26.0 - 34.0 PG    MCHC 32.4 30.0 - 36.5 g/dL    RDW 14.2 11.5 - 14.5 %    PLATELET 752 133 - 781 K/uL    NEUTROPHILS 58 32 - 75 %    LYMPHOCYTES 29 12 - 49 %    MONOCYTES 7 5 - 13 %    EOSINOPHILS 6 0 - 7 %    BASOPHILS 0 0 - 1 %    ABS. NEUTROPHILS 5.7 1.8 - 8.0 K/UL    ABS. LYMPHOCYTES 2.8 0.8 - 3.5 K/UL    ABS. MONOCYTES 0.7 0.0 - 1.0 K/UL    ABS. EOSINOPHILS 0.6 (H) 0.0 - 0.4 K/UL    ABS. BASOPHILS 0.0 0.0 - 0.1 K/UL   METABOLIC PANEL, COMPREHENSIVE    Collection Time: 10/22/17  6:55 AM   Result Value Ref Range    Sodium 138 136 - 145 mmol/L    Potassium 3.5 3.5 - 5.1 mmol/L    Chloride 102 97 - 108 mmol/L    CO2 28 21 - 32 mmol/L    Anion gap 8 5 - 15 mmol/L    Glucose 147 (H) 65 - 100 mg/dL    BUN 12 6 - 20 MG/DL    Creatinine 0.91 0.55 - 1.02 MG/DL    BUN/Creatinine ratio 13 12 - 20      GFR est AA >60 >60 ml/min/1.73m2    GFR est non-AA >60 >60 ml/min/1.73m2    Calcium 8.8 8.5 - 10.1 MG/DL    Bilirubin, total 0.3 0.2 - 1.0 MG/DL    ALT (SGPT) 36 12 - 78 U/L    AST (SGOT) 26 15 - 37 U/L    Alk. phosphatase 85 45 - 117 U/L    Protein, total 7.3 6.4 - 8.2 g/dL    Albumin 3.6 3.5 - 5.0 g/dL    Globulin 3.7 2.0 - 4.0 g/dL    A-G Ratio 1.0 (L) 1.1 - 2.2         Radiologic Studies -  The following have been ordered and reviewed:    CT Results  (Last 48 hours)               10/22/17 0709  CT ABD PELV WO CONT Final result    Impression:  IMPRESSION:    A 6 monitor calculus at the left ureterovesical junction results in moderate   left hydroureteronephrosis. There is no other  calculus. Narrative:  EXAM:  CT ABD PELV WO CONT       INDICATION: Acute left flank pain. COMPARISON: CT 11/19/2008. TECHNIQUE: Helical CT of the abdomen  and pelvis  without contrast. Coronal and   sagittal reformats are performed.  CT dose reduction was achieved through use of   a standardized protocol tailored for this examination and automatic exposure   control for dose modulation. FINDINGS:    Solid organ evaluation is limited without contrast.        The visualized lung bases demonstrate no mass or consolidation. The heart size   is normal. There is no pericardial or pleural effusion. There is a 6 mm calculus at the left ureterovesical junction resulting in   moderate left hydroureteronephrosis. No other renal, ureteral, or bladder   calculus is identified. There is no right hydronephrosis. The urinary bladder is   unremarkable. The liver, spleen, pancreas, and adrenal glands are normal.  The gall bladder is   surgically absent without intra- or extra-hepatic biliary dilatation. There are no dilated bowel loops. The appendix is normal.  There are no   enlarged lymph nodes. There is no free fluid or free air. The aorta tapers   without aneurysm. There is no pelvic mass. There is no aggressive bony lesion. Vital Signs-Reviewed the patient's vital signs. Patient Vitals for the past 12 hrs:   Temp Pulse Resp BP SpO2   10/22/17 0801 - 83 14 114/77 99 %   10/22/17 0732 - - - - 98 %   10/22/17 0731 - - - 107/53 -   10/22/17 0552 98.4 °F (36.9 °C) 84 20 (!) 140/96 97 %       Medications Given in the ED:  Medications   sodium chloride 0.9 % bolus infusion 1,000 mL (0 mL IntraVENous IV Completed 10/22/17 0801)   ketorolac (TORADOL) injection 30 mg (30 mg IntraVENous Given 10/22/17 0653)   morphine injection 2 mg (2 mg IntraVENous Given 10/22/17 0653)       Diagnosis:  Clinical Impression:   1.  Left ureteral stone         Plan:  1:   Follow-up Information     Follow up With Details Comments Contact Info    Your PCP Call      [facility] Call  [place] [phone] [place] [facility] Call [place] [phone]            2:   Discharge Medication List as of 10/22/2017  7:53 AM      START taking these medications    Details tamsulosin (FLOMAX) 0.4 mg capsule Take 1 Cap by mouth daily for 7 doses. , Normal, Disp-7 Cap, R-0      HYDROcodone-acetaminophen (NORCO) 5-325 mg per tablet Take 1 Tab by mouth every four (4) hours as needed for Pain. Max Daily Amount: 6 Tabs., Print, Disp-4 Tab, R-0      ondansetron (ZOFRAN ODT) 4 mg disintegrating tablet Take 1 Tab by mouth every eight (8) hours as needed for Nausea., Normal, Disp-10 Tab, R-0         CONTINUE these medications which have NOT CHANGED    Details   azithromycin (ZITHROMAX Z-ERIKA) 250 mg tablet Take two tablets today then one tablet daily, Normal, Disp-6 Tab, R-0      albuterol (PROVENTIL HFA, VENTOLIN HFA, PROAIR HFA) 90 mcg/actuation inhaler Take 2 Puffs by inhalation every four (4) hours as needed for Wheezing., Normal, Disp-1 Inhaler, R-0      TRAZODONE HCL (TRAZODONE PO) Take  by mouth., Historical Med      diphenhydrAMINE (BENADRYL) 25 mg capsule Take 2 Caps by mouth every six (6) hours as needed., Normal, Disp-12 Cap, R-0      meloxicam (MOBIC) 15 mg tablet Take 15 mg by mouth daily. , Historical Med      pantoprazole (PROTONIX) 40 mg tablet Take 40 mg by mouth daily. , Historical Med      benztropine (COGENTIN) 0.5 mg tablet Take 0.5 mg by mouth two (2) times a day., Historical Med      pravastatin (PRAVACHOL) 10 mg tablet Take 10 mg by mouth nightly., Historical Med      ALPRAZolam (XANAX) 1 mg tablet Take 1 mg by mouth., Historical Med      Cholecalciferol, Vitamin D3, 50,000 unit cap Take  by mouth., Historical Med      zolpidem (AMBIEN) 5 mg tablet Take 5 mg by mouth nightly as needed for Sleep., Historical Med      mirtazapine (REMERON) 15 mg tablet Take 15 mg by mouth nightly., Historical Med      risperiDONE (RISPERDAL) 2 mg tablet Take  by mouth.  , Historical Med      TIOTROPIUM BROMIDE (SPIRIVA WITH HANDIHALER IN) Take  by inhalation. , Historical Med      sertraline (ZOLOFT) 100 mg tablet Take  by mouth daily. , Historical Med           Return to ED if worse. Disposition:  DISCHARGE NOTE  8:03 AM  The patient has been re-evaluated and is ready for discharge. Reviewed available results with patient. Counseled patient on diagnosis and care plan. Patient has expressed understanding, and all questions have been answered. Patient agrees with plan and agrees to follow up as recommended, or return to the ED if their symptoms worsen. Discharge instructions have been provided and explained to the patient, along with reasons to return to the ED.  _______________________________   Attestations: This note is prepared by Griffin Hernandez, acting as Scribe for MD Quiana Moe MD: The scribe's documentation has been prepared under my direction and personally reviewed by me in its entirety.  I confirm that the note above accurately reflects all work, treatment, procedures, and medical decision making performed by me.  _______________________________

## 2017-10-22 NOTE — ED NOTES
Report received from night shift rns, julieta benites rn, and [employer]. No si/s of acute distress. Pt appears to be resting comfortably. Plan of care discussed with pt. Call bell within reach.

## 2017-10-22 NOTE — DISCHARGE INSTRUCTIONS
Kidney Stone: Care Instructions  Your Care Instructions    Kidney stones are formed when salts, minerals, and other substances normally found in the urine clump together. They can be as small as grains of sand or, rarely, as large as golf balls. While the stone is traveling through the ureter, which is the tube that carries urine from the kidney to the bladder, you will probably feel pain. The pain may be mild or very severe. You may also have some blood in your urine. As soon as the stone reaches the bladder, any intense pain should go away. If a stone is too large to pass on its own, you may need a medical procedure to help you pass the stone. The doctor has checked you carefully, but problems can develop later. If you notice any problems or new symptoms, get medical treatment right away. Follow-up care is a key part of your treatment and safety. Be sure to make and go to all appointments, and call your doctor if you are having problems. It's also a good idea to know your test results and keep a list of the medicines you take. How can you care for yourself at home? · Drink plenty of fluids, enough so that your urine is light yellow or clear like water. If you have kidney, heart, or liver disease and have to limit fluids, talk with your doctor before you increase the amount of fluids you drink. · Take pain medicines exactly as directed. Call your doctor if you think you are having a problem with your medicine. ¨ If the doctor gave you a prescription medicine for pain, take it as prescribed. ¨ If you are not taking a prescription pain medicine, ask your doctor if you can take an over-the-counter medicine. Read and follow all instructions on the label. · Your doctor may ask you to strain your urine so that you can collect your kidney stone when it passes. You can use a kitchen strainer or a tea strainer to catch the stone. Store it in a plastic bag until you see your doctor again.   Preventing future kidney stones  Some changes in your diet may help prevent kidney stones. Depending on the cause of your stones, your doctor may recommend that you:  · Drink plenty of fluids, enough so that your urine is light yellow or clear like water. If you have kidney, heart, or liver disease and have to limit fluids, talk with your doctor before you increase the amount of fluids you drink. · Limit coffee, tea, and alcohol. Also avoid grapefruit juice. · Do not take more than the recommended daily dose of vitamins C and D.  · Avoid antacids such as Gaviscon, Maalox, Mylanta, or Tums. · Limit the amount of salt (sodium) in your diet. · Eat a balanced diet that is not too high in protein. · Limit foods that are high in a substance called oxalate, which can cause kidney stones. These foods include dark green vegetables, rhubarb, chocolate, wheat bran, nuts, cranberries, and beans. When should you call for help? Call your doctor now or seek immediate medical care if:  · You cannot keep down fluids. · Your pain gets worse. · You have a fever or chills. · You have new or worse pain in your back just below your rib cage (the flank area). · You have new or more blood in your urine. Watch closely for changes in your health, and be sure to contact your doctor if:  · You do not get better as expected. Where can you learn more? Go to http://salvador-ricky.info/. Enter P982 in the search box to learn more about \"Kidney Stone: Care Instructions. \"  Current as of: April 3, 2017  Content Version: 11.3  © 4209-3224 [employer]. Care instructions adapted under license by [employer] (which disclaims liability or warranty for this information). If you have questions about a medical condition or this instruction, always ask your healthcare professional. [address] any warranty or liability for your use of this information.          Learning About Diet for Kidney Jatin Howe Prevention  What are kidney stones? Kidney stones are made of salts and minerals in the urine that form small \"ton. \" Stones can form in the kidneys and the ureters (the tubes that lead from the kidneys to the bladder). They can also form in the bladder. Stones may not cause a problem as long as they stay in the kidneys. But they can cause sudden, severe pain. Pain is most likely when the stones travel from the kidneys to the bladder. Kidney stones can cause bloody urine. Kidney stones often run in families. You are more likely to get them if you don't drink enough fluids, mainly water. Certain foods and drinks and some dietary supplements may also increase your risk for kidney stones if you consume too much of them. What can you do to prevent kidney stones? Changing what you eat may not prevent all types of kidney stones. But for people who have a history of certain kinds of kidney stones, some changes in diet may help. A dietitian can help you set up a meal plan that includes healthy, low-oxalate choices. Here are some general guidelines to get you started. Plan your meals and snacks around foods that are low in oxalate. These foods include:  · Corn, kale, parsnips, and squash,. · Beef, chicken, pork, turkey, and fish. · Milk, butter, cheese, and yogurt. You can eat certain foods that are medium-high in oxalate, but eat them only once in a while. These foods include:  · Bread. · Brown rice. · English muffins. · Figs. · Popcorn. · String beans. · Tomatoes. Limit very high-oxalate foods, including:  · Black tea. · Coffee. · Chocolate. · Dark green vegetables. · Nuts. Here are some other things you can do to help prevent kidney stones. · Drink plenty of fluids. If you have kidney, heart, or liver disease and have to limit fluids, talk with your doctor before you increase the amount of fluids you drink.   · Do not take more than the recommended daily dose of vitamins C and D.  · Limit the salt in your diet. · Eat a balanced diet that is not too high in protein. Follow-up care is a key part of your treatment and safety. Be sure to make and go to all appointments, and call your doctor if you are having problems. It's also a good idea to know your test results and keep a list of the medicines you take. Where can you learn more? Go to http://salvador-ricky.info/. Enter C138 in the search box to learn more about \"Learning About Diet for Kidney Stone Prevention. \"  Current as of: July 26, 2016  Content Version: 11.3  © 8777-7955 [employer]. Care instructions adapted under license by [employer] (which disclaims liability or warranty for this information). If you have questions about a medical condition or this instruction, always ask your healthcare professional. [address] any warranty or liability for your use of this information.

## 2017-10-22 NOTE — ED NOTES
..Patient has been instructed that they have been given morphine* which contains opioids, benzodiazepines, or other sedating drugs. Patient is aware that they  will need to refrain from driving or operating heavy machinery after taking this medication. Patient also instructed that they need to avoid drinking alcohol and using other products containing opioids, benzodiazepines, or other sedating drugs. Patient verbalized understanding.

## 2017-10-22 NOTE — ED NOTES
....Discharge summary and discharge medications reviewed with patient and appropriate educational materials and side effects teaching were provided. patient  Given 1 paper prescriptions and 2 electronic prescriptions sent to pt's listed pharmacy. Patient (s) verbalized understanding of the importance of discussing medications with his or her physician or clinic they will be following up with. No si/s of acute distress prior to discharge. Patient offered wheelchair from treatment area to hospital entrance, patient refused wheelchair. Denied pain or any complaints. Pt given ginger ale/crackers per request. Pt discharged with a steady gait.

## 2017-10-23 LAB
BACTERIA SPEC CULT: NORMAL
CC UR VC: NORMAL
SERVICE CMNT-IMP: NORMAL

## 2018-07-10 ENCOUNTER — HOSPITAL ENCOUNTER (EMERGENCY)
Age: 53
Discharge: HOME | End: 2018-07-10
Attending: EMERGENCY MEDICINE | Admitting: EMERGENCY MEDICINE
Payer: MEDICAID

## 2018-07-10 VITALS
RESPIRATION RATE: 15 BRPM | BODY MASS INDEX: 33.85 KG/M2 | HEIGHT: 65 IN | HEART RATE: 52 BPM | SYSTOLIC BLOOD PRESSURE: 133 MMHG | TEMPERATURE: 98 F | WEIGHT: 203.19 LBS | OXYGEN SATURATION: 100 % | DIASTOLIC BLOOD PRESSURE: 79 MMHG

## 2018-07-10 DIAGNOSIS — M25.561 RIGHT KNEE PAIN, UNSPECIFIED CHRONICITY: Primary | ICD-10-CM

## 2018-07-10 DIAGNOSIS — R20.0 NUMBNESS OF RIGHT HAND: ICD-10-CM

## 2018-07-10 PROCEDURE — 99283 EMERGENCY DEPT VISIT LOW MDM: CPT

## 2018-07-10 PROCEDURE — 74011250637 HC RX REV CODE- 250/637: Performed by: PHYSICIAN ASSISTANT

## 2018-07-10 RX ORDER — HYDROCODONE BITARTRATE AND ACETAMINOPHEN 5; 325 MG/1; MG/1
1 TABLET ORAL
Status: COMPLETED | OUTPATIENT
Start: 2018-07-10 | End: 2018-07-10

## 2018-07-10 RX ORDER — PREDNISONE 5 MG/1
TABLET ORAL
Qty: 21 TAB | Refills: 0 | Status: SHIPPED | OUTPATIENT
Start: 2018-07-10 | End: 2018-08-15

## 2018-07-10 RX ORDER — TRAMADOL HYDROCHLORIDE 50 MG/1
50 TABLET ORAL
Qty: 10 TAB | Refills: 0 | Status: SHIPPED | OUTPATIENT
Start: 2018-07-10 | End: 2018-08-22

## 2018-07-10 RX ADMIN — HYDROCODONE BITARTRATE AND ACETAMINOPHEN 1 TABLET: 5; 325 TABLET ORAL at 10:36

## 2018-07-10 NOTE — ED NOTES
Pt presents with c/o right knee and right wrist/arm pain that has persisted constantly for the past three months w/o relief. Patient has a brace in place over right knee. Brace looks well-fitted and adjusted to patient. patient is ambulatory independently with brace to right knee. Also has c/o right wrist and arm pain. Denies injury to joints. Denies orthopedic follow up for chronic problems. Patient appears drowsy. Emergency Department Nursing Plan of Care       The Nursing Plan of Care is developed from the Nursing assessment and Emergency Department Attending provider initial evaluation. The plan of care may be reviewed in the ED Provider note.     The Plan of Care was developed with the following considerations:   Patient / Family readiness to learn indicated by:verbalized understanding  Persons(s) to be included in education: patient  Barriers to Learning/Limitations:Cognitive/physical impairment:    Signed     Monty Osgood, RN    7/10/2018   10:23 AM

## 2018-07-10 NOTE — ED PROVIDER NOTES
EMERGENCY DEPARTMENT HISTORY AND PHYSICAL EXAM 
 
 
Date: 7/10/2018 Patient Name: Georgina Duke History of Presenting Illness Chief Complaint Patient presents with  Knee Pain  Hand Pain History Provided By: Patient HPI: Georgina Duke, 46 y.o. female with PMHx significant for bipolar ds, depression, arthritis, GERD presents ambulatory to the ED with cc of right knee pain x 2 mo. Denies trauma/injury. Pain worse with ambulation. Reports intermittent swelling. Describes pain as a sharp pain, does not radiate. Rates pain 10/10. Has been taking tramadol with relief. Has not f/u ortho. Denies hx arthritis in knee. Denies previous knee trauma or surgery. Pt also reports right hand tingling x \"months\". Ariana trauma/injury. Denies hx carpal tunnel. Reports tingling is starting to spread up arm. Reports tingling is constant. Denies pain. Has taken nothing for sx. Reports decreased sensation at times when picking things up. Denies headache, chest pain, SOB. Denies hx stroke, TIA. There are no other complaints, changes, or physical findings at this time. PCP: Lauryn Floyd MD 
 
Current Facility-Administered Medications Medication Dose Route Frequency Provider Last Rate Last Dose  
 HYDROcodone-acetaminophen (NORCO) 5-325 mg per tablet 1 Tab  1 Tab Oral NOW KAILA Wesley Current Outpatient Prescriptions Medication Sig Dispense Refill  traMADol (ULTRAM) 50 mg tablet Take 1 Tab by mouth every six (6) hours as needed for Pain. Max Daily Amount: 200 mg. Indications: Pain 10 Tab 0  predniSONE (STERAPRED) 5 mg dose pack See administration instruction per 5mg dose pack 21 Tab 0  
 ondansetron (ZOFRAN ODT) 4 mg disintegrating tablet Take 1 Tab by mouth every eight (8) hours as needed for Nausea. 10 Tab 0  
 albuterol (PROVENTIL HFA, VENTOLIN HFA, PROAIR HFA) 90 mcg/actuation inhaler Take 2 Puffs by inhalation every four (4) hours as needed for Wheezing. 1 Inhaler 0  
 TRAZODONE HCL (TRAZODONE PO) Take  by mouth.  diphenhydrAMINE (BENADRYL) 25 mg capsule Take 2 Caps by mouth every six (6) hours as needed. 12 Cap 0  
 meloxicam (MOBIC) 15 mg tablet Take 15 mg by mouth daily.  pantoprazole (PROTONIX) 40 mg tablet Take 40 mg by mouth daily.  benztropine (COGENTIN) 0.5 mg tablet Take 0.5 mg by mouth two (2) times a day.  pravastatin (PRAVACHOL) 10 mg tablet Take 10 mg by mouth nightly.  Cholecalciferol, Vitamin D3, 50,000 unit cap Take  by mouth.  zolpidem (AMBIEN) 5 mg tablet Take 5 mg by mouth nightly as needed for Sleep.  mirtazapine (REMERON) 15 mg tablet Take 15 mg by mouth nightly.  risperiDONE (RISPERDAL) 2 mg tablet Take  by mouth.  TIOTROPIUM BROMIDE (SPIRIVA WITH HANDIHALER IN) Take  by inhalation.  sertraline (ZOLOFT) 100 mg tablet Take  by mouth daily. Past History Past Medical History: 
Past Medical History:  
Diagnosis Date  Acid reflux  Arthritis  COPD  Depression  Other ill-defined conditions(799.89) Chronic Back Pain  Psychiatric disorder   
 bipolar Past Surgical History: 
Past Surgical History:  
Procedure Laterality Date [address] UNLISTED Cholecystectomy  HX CHOLECYSTECTOMY  HX GYN    
 oophorectomy, left  HX ORTHOPAEDIC Fracture repair/knee Family History: 
History reviewed. No pertinent family history. Social History: 
Social History Substance Use Topics  Smoking status: Current Every Day Smoker Packs/day: 0.25 Types: Cigarettes  Smokeless tobacco: Never Used  Alcohol use Yes Comment: socially once a month Allergies: Allergies Allergen Reactions  Latex Rash  Sulfa (Sulfonamide Antibiotics) Rash Review of Systems Review of Systems Constitutional: Negative for chills and fever. Respiratory: Negative for shortness of breath.    
Cardiovascular: Negative for chest pain. Gastrointestinal: Negative for abdominal pain, nausea and vomiting. Genitourinary: Negative for flank pain. Musculoskeletal: Positive for arthralgias. Negative for back pain, gait problem, joint swelling and myalgias. Skin: Negative for color change, pallor, rash and wound. Neurological: Negative for dizziness, weakness and light-headedness. All other systems reviewed and are negative. Physical Exam  
Physical Exam  
Constitutional: She is oriented to person, place, and time. She appears well-developed and well-nourished. No distress. HENT:  
Head: Normocephalic and atraumatic. Eyes: Conjunctivae are normal.  
Cardiovascular: Normal rate, regular rhythm and normal heart sounds. Pulmonary/Chest: Effort normal and breath sounds normal. No respiratory distress. Abdominal: Soft. Bowel sounds are normal. She exhibits no distension. Musculoskeletal:  
     Right elbow: Normal. 
     Right wrist: She exhibits tenderness and bony tenderness. She exhibits normal range of motion, no swelling and no effusion. Left wrist: Normal.  
     Right knee: She exhibits normal range of motion, no swelling, no effusion, no ecchymosis and no bony tenderness. Tenderness found. Medial joint line tenderness noted. Right forearm: Normal.  
     Right hand: She exhibits normal range of motion, no tenderness, normal capillary refill and no swelling. Normal sensation noted. Normal strength noted. Neurological: She is alert and oriented to person, place, and time. Skin: Skin is warm. No rash noted. Psychiatric: She has a normal mood and affect. Her behavior is normal.  
Nursing note and vitals reviewed. Diagnostic Study Results Labs - No results found for this or any previous visit (from the past 12 hour(s)). Radiologic Studies - No orders to display CT Results  (Last 48 hours) None CXR Results  (Last 48 hours) None Medical Decision Making I am the first provider for this patient. I reviewed the vital signs, available nursing notes, past medical history, past surgical history, family history and social history. Vital Signs-Reviewed the patient's vital signs. Patient Vitals for the past 12 hrs: 
 Temp Pulse Resp BP SpO2  
07/10/18 0959 98 °F (36.7 °C) (!) 52 15 133/79 100 % 07/10/18 0958 98 °F (36.7 °C) (!) 52 17 133/79 99 % Records Reviewed: Nursing Notes, Old Medical Records, Previous Radiology Studies and Previous Laboratory Studies Provider Notes (Medical Decision Making): DDx: Carpal tunnel, hand numbness, wrist tendonitis Knee sprain vs strain vs fracture vs arthritis No xray ordered due to extended length of sx, lack of swelling, and lack of trauma/injury. Discussed that extensive work up at ortho will be more beneficial.   
 
ED Course:  
Initial assessment performed. The patients presenting problems have been discussed, and they are in agreement with the care plan formulated and outlined with them. I have encouraged them to ask questions as they arise throughout their visit. Disposition: 
10:33 AM 
Discussed dx and treatment plan. Discussed importance of ortho and hand surgey follow up. All questions answered. Pt voiced they understood. Return if sx worsen. PLAN: 
1. Current Discharge Medication List  
  
START taking these medications Details  
traMADol (ULTRAM) 50 mg tablet Take 1 Tab by mouth every six (6) hours as needed for Pain. Max Daily Amount: 200 mg. Indications: Pain 
Qty: 10 Tab, Refills: 0 Associated Diagnoses: Right knee pain, unspecified chronicity; Numbness of right hand  
  
predniSONE (STERAPRED) 5 mg dose pack See administration instruction per 5mg dose pack Qty: 21 Tab, Refills: 0 Associated Diagnoses: Right knee pain, unspecified chronicity; Numbness of right hand STOP taking these medications  HYDROcodone-acetaminophen (NORCO) 5-325 mg per tablet Comments: Reason for Stopping: ALPRAZolam (XANAX) 1 mg tablet Comments:  
Reason for Stoppin.  
Follow-up Information Follow up With Details Comments Contact Info [facility] Schedule an appointment as soon as possible for a visit in 1 day for PCP f/u [address] [facility] [place] 
[phone] Kera Nino MD Schedule an appointment as soon as possible for a visit in 1 day for hand numbness [address] [facility] [address] Return to ED if worse Diagnosis Clinical Impression: 1. Right knee pain, unspecified chronicity 2. Numbness of right hand

## 2018-07-10 NOTE — ED TRIAGE NOTES
Pt presents with c/o right wrist and right knee pain persisting for at least three months. Denies injury or trauma.

## 2018-07-10 NOTE — ED NOTES
..Discharge summary and discharge medications reviewed with patient and appropriate educational materials and side effects teaching were provided. patient  Given 1 paper prescriptions and 0 electronic prescriptions sent to pt's listed pharmacy. Patient (s) verbalized understanding of the importance of discussing medications with his or her physician or clinic they will be following up with. No si/s of acute distress prior to discharge. Patient offered wheelchair from treatment area to hospital entrance, patient declined wheelchair.

## 2018-07-10 NOTE — DISCHARGE INSTRUCTIONS
Numbness and Tingling: Care Instructions  Your Care Instructions    Many things can cause numbness or tingling. Swelling may put pressure on a nerve. This could cause you to lose feeling or have a pins-and-needles sensation on part of your body. Nerves may be damaged from trauma, toxins, or diseases, such as diabetes or multiple sclerosis (MS). Sometimes, though, the cause is not clear. If there is no clear reason for your symptoms, and you are not having any other symptoms, your doctor may suggest watching and waiting for a while to see if the numbness or tingling goes away on its own. Your doctor may want you to have blood or nerve tests to find the cause of your symptoms. Follow-up care is a key part of your treatment and safety. Be sure to make and go to all appointments, and call your doctor if you are having problems. It's also a good idea to know your test results and keep a list of the medicines you take. How can you care for yourself at home? · If your doctor prescribes medicine, take it exactly as directed. Call your doctor if you think you are having a problem with your medicine. · If you have any swelling, put ice or a cold pack on the area for 10 to 20 minutes at a time. Put a thin cloth between the ice and your skin. When should you call for help? Call 911 anytime you think you may need emergency care. For example, call if:  ? · You have weakness, numbness, or tingling in both legs. ? · You lose bowel or bladder control. ? · You have symptoms of a stroke. These may include:  ¨ Sudden numbness, tingling, weakness, or loss of movement in your face, arm, or leg, especially on only one side of your body. ¨ Sudden vision changes. ¨ Sudden trouble speaking. ¨ Sudden confusion or trouble understanding simple statements. ¨ Sudden problems with walking or balance. ¨ A sudden, severe headache that is different from past headaches. ? Watch closely for changes in your health, and be sure to contact your doctor if you have any problems, or if:  ? · You do not get better as expected. Where can you learn more? Go to http://salvador-ricky.info/. Enter O835 in the search box to learn more about \"Numbness and Tingling: Care Instructions. \"  Current as of: October 14, 2016  Content Version: 11.4  © 0810-3846 [employer]. Care instructions adapted under license by [employer] (which disclaims liability or warranty for this information). If you have questions about a medical condition or this instruction, always ask your healthcare professional. [place] [address] any warranty or liability for your use of this information. Arthritis: Care Instructions  Your Care Instructions  Arthritis, also called osteoarthritis, is a breakdown of the cartilage that cushions your joints. When the cartilage wears down, your bones rub against each other. This causes pain and stiffness. Many people have some arthritis as they age. Arthritis most often affects the joints of the spine, hands, hips, knees, or feet. You can take simple measures to protect your joints, ease your pain, and help you stay active. Follow-up care is a key part of your treatment and safety. Be sure to make and go to all appointments, and call your doctor if you are having problems. It's also a good idea to know your test results and keep a list of the medicines you take. How can you care for yourself at home? · Stay at a healthy weight. Being overweight puts extra strain on your joints. · Talk to your doctor or physical therapist about exercises that will help ease joint pain. ¨ Stretch. You may enjoy gentle forms of yoga to help keep your joints and muscles flexible. ¨ Walk instead of jog. Other types of exercise that are less stressful on the joints include riding a bicycle, swimming, scot chi, or water exercise. ¨ Lift weights. Strong muscles help reduce stress on your joints. Stronger thigh muscles, for example, take some of the stress off of the knees and hips. Learn the right way to lift weights so you do not make joint pain worse. · Take your medicines exactly as prescribed. Call your doctor if you think you are having a problem with your medicine. · Take pain medicines exactly as directed. ¨ If the doctor gave you a prescription medicine for pain, take it as prescribed. ¨ If you are not taking a prescription pain medicine, ask your doctor if you can take an over-the-counter medicine. · Use a cane, crutch, walker, or another device if you need help to get around. These can help rest your joints. You also can use other things to make life easier, such as a higher toilet seat and padded handles on kitchen utensils. · Do not sit in low chairs, which can make it hard to get up. · Put heat or cold on your sore joints as needed. Use whichever helps you most. You also can take turns with hot and cold packs. ¨ Apply heat 2 or 3 times a day for 20 to 30 minutes-using a heating pad, hot shower, or hot pack-to relieve pain and stiffness. ¨ Put ice or a cold pack on your sore joint for 10 to 20 minutes at a time. Put a thin cloth between the ice and your skin. When should you call for help? Call your doctor now or seek immediate medical care if:  ? · You have sudden swelling, warmth, or pain in any joint. ? · You have joint pain and a fever or rash. ? · You have such bad pain that you cannot use a joint. ? Watch closely for changes in your health, and be sure to contact your doctor if:  ? · You have mild joint symptoms that continue even with more than 6 weeks of care at home. ? · You have stomach pain or other problems with your medicine. Where can you learn more? Go to http://salvador-ricky.info/. Enter U462 in the search box to learn more about \"Arthritis: Care Instructions. \"  Current as of: October 31, 2016  Content Version: 11.4  © 8900-1227 [employer]. Care instructions adapted under license by [employer] (which disclaims liability or warranty for this information). If you have questions about a medical condition or this instruction, always ask your healthcare professional. [address] any warranty or liability for your use of this information.

## 2018-08-15 ENCOUNTER — HOSPITAL ENCOUNTER (OUTPATIENT)
Dept: PREADMISSION TESTING | Age: 53
Discharge: HOME | End: 2018-08-15

## 2018-08-15 VITALS
BODY MASS INDEX: 33.41 KG/M2 | OXYGEN SATURATION: 100 % | HEART RATE: 59 BPM | HEIGHT: 65 IN | WEIGHT: 200.5 LBS | SYSTOLIC BLOOD PRESSURE: 117 MMHG | TEMPERATURE: 98.4 F | DIASTOLIC BLOOD PRESSURE: 70 MMHG

## 2018-08-15 RX ORDER — DOXEPIN HYDROCHLORIDE 10 MG/1
10 CAPSULE ORAL
COMMUNITY

## 2018-08-15 RX ORDER — GABAPENTIN 300 MG/1
300 CAPSULE ORAL 3 TIMES DAILY
COMMUNITY

## 2018-08-15 RX ORDER — POTASSIUM CHLORIDE 750 MG/1
10 TABLET, FILM COATED, EXTENDED RELEASE ORAL DAILY
COMMUNITY

## 2018-08-15 RX ORDER — METHOCARBAMOL 750 MG/1
750 TABLET, FILM COATED ORAL
COMMUNITY

## 2018-08-21 NOTE — H&P
CARE TEAM:  Patient Care Team:  Guerita Rodas MD as PCP - General (Internal Medicine)     ASSESSMENT:  1. Right carpal tunnel syndrome          There is no problem list on file for this patient.        PLAN:  Treatment Plan:  We discussed her treatment options at length. She has requested surgical management. Dear discussed reasonable expectations. We discussed usual postoperative course. She has given informed consent to proceed.         Orders Placed This Encounter    BMI >=25 PATIENT INSTRUCTIONS & EDUCATION      Follow-up: Return for first postoperative visit.      HISTORY OF PRESENT ILLNESS:  Chief Complaint: Follow-up of the Right Hand     Age: 46 y.o. Sex: female   History of present illness: Maris cuadra pleasant 46 y.o. female who presents today for evaluation of right hand. She continues to complain of significant pain and numbness in the hand. She complains of some pain in the thumb. Denies mechanical symptoms. She recently had a nerve conduction study and EMG. She wants to get her hand fixed.     Past medical history, past surgical history, medications, allergies, social history, and review of systems have been reviewed by me. No current facility-administered medications on file prior to encounter. Current Outpatient Prescriptions on File Prior to Encounter   Medication Sig Dispense Refill    traMADol (ULTRAM) 50 mg tablet Take 1 Tab by mouth every six (6) hours as needed for Pain. Max Daily Amount: 200 mg. Indications: Pain (Patient taking differently: Take 100 mg by mouth two (2) times a day. Indications: Pain) 10 Tab 0    albuterol (PROVENTIL HFA, VENTOLIN HFA, PROAIR HFA) 90 mcg/actuation inhaler Take 2 Puffs by inhalation every four (4) hours as needed for Wheezing. 1 Inhaler 0    TRAZODONE HCL (TRAZODONE PO) Take  by mouth.  diphenhydrAMINE (BENADRYL) 25 mg capsule Take 2 Caps by mouth every six (6) hours as needed.  12 Cap 0    pantoprazole (PROTONIX) 40 mg tablet Take 40 mg by mouth as needed.  Cholecalciferol, Vitamin D3, 50,000 unit cap Take  by mouth.  TIOTROPIUM BROMIDE (SPIRIVA WITH HANDIHALER IN) Take  by inhalation.  sertraline (ZOLOFT) 100 mg tablet Take 100 mg by mouth two (2) times a day. Past Medical History:   Diagnosis Date    Acid reflux     Arthritis     KNEE, BACK    COPD     Depression     Other ill-defined conditions(799.89)     Chronic Back Pain    Psychiatric disorder     bipolar       Allergies   Allergen Reactions    Latex Rash    Sulfa (Sulfonamide Antibiotics) Rash       Social History     Social History    Marital status: LEGALLY      Spouse name: N/A    Number of children: N/A    Years of education: N/A     Occupational History    Not on file. Social History Main Topics    Smoking status: Current Every Day Smoker     Packs/day: 0.50     Years: 46.00     Types: Cigarettes    Smokeless tobacco: Never Used    Alcohol use Yes      Comment: 2 DRINKS PER MONTH    Drug use: Yes     Special: Heroin      Comment: PT. DENIES 8-15-18    Sexual activity: Yes     Partners: Male     Other Topics Concern    Not on file     Social History Narrative          Review of Systems   8/13/2018     Constitutional: Unexplained: Negative  Genitourinary: Frequent Urination: Negative  HEENT: Vision Loss: Negative  Neurological: Memory Loss: Negative  Integumentary: Rash: Negative  Cardiovascular: Palpatations: Negative  Hematologic: Bruises/Bleeds Easily: Negative  Gastrointestinal: Constipation: Negative  Immunological: Seasonal Allergies: Positive  Musculoskeletal: Joint Pain: Positive        OBJECTIVE:  Constitutional:  No acute distress. Pleasant and cooperative with exam.  HEENT: Normocephalic. Atraumatic. Eyes are clear  Hearing intact to spoken word   Respiratory:  Breathing unlabored. .  Cardiovascular:  No marked edema. Psychiatric: Alert.  Affect appropriate.   Gait: Normal.  Musculoskeletal    Skin is intact  Positive Tinel's over the carpal canal. Positive carpal compression test.  Mildly altered median nerve sensibility. Full digital motion. Full forearm rotation. She has global tenderness to palpation. Tenderness over the radiocarpal joint. Tenderness over the 1st dorsal compartment. Tenderness dorsally over the wrist.  Tenderness over the basal joint. Tenderness at the MCP joint of the thumb. No nodule. No triggering.     IMAGING / STUDIES:           No imaging obtained       We reviewed her nerve conduction study and EMG. She has mild right carpal tunnel syndrome.

## 2018-08-22 ENCOUNTER — HOSPITAL ENCOUNTER (OUTPATIENT)
Age: 53
Setting detail: OUTPATIENT SURGERY
Discharge: HOME | End: 2018-08-22
Attending: ORTHOPAEDIC SURGERY | Admitting: ORTHOPAEDIC SURGERY
Payer: MEDICAID

## 2018-08-22 ENCOUNTER — ANESTHESIA (OUTPATIENT)
Dept: MEDSURG UNIT | Age: 53
End: 2018-08-22
Payer: MEDICAID

## 2018-08-22 ENCOUNTER — ANESTHESIA EVENT (OUTPATIENT)
Dept: MEDSURG UNIT | Age: 53
End: 2018-08-22
Payer: MEDICAID

## 2018-08-22 VITALS
WEIGHT: 200 LBS | SYSTOLIC BLOOD PRESSURE: 113 MMHG | OXYGEN SATURATION: 95 % | RESPIRATION RATE: 18 BRPM | TEMPERATURE: 97.5 F | HEART RATE: 74 BPM | DIASTOLIC BLOOD PRESSURE: 77 MMHG | BODY MASS INDEX: 33.32 KG/M2 | HEIGHT: 65 IN

## 2018-08-22 DIAGNOSIS — G56.01 RIGHT CARPAL TUNNEL SYNDROME: Primary | ICD-10-CM

## 2018-08-22 PROCEDURE — 74011000250 HC RX REV CODE- 250: Performed by: ORTHOPAEDIC SURGERY

## 2018-08-22 PROCEDURE — 77030002916 HC SUT ETHLN J&J -A: Performed by: ORTHOPAEDIC SURGERY

## 2018-08-22 PROCEDURE — 77030028224 HC PDNG CST BSNM -A: Performed by: ORTHOPAEDIC SURGERY

## 2018-08-22 PROCEDURE — 74011250636 HC RX REV CODE- 250/636

## 2018-08-22 PROCEDURE — 76210000035 HC AMBSU PH I REC 1 TO 1.5 HR: Performed by: ORTHOPAEDIC SURGERY

## 2018-08-22 PROCEDURE — 77030006602 HC BLD CRPL AGEE MCRA -C: Performed by: ORTHOPAEDIC SURGERY

## 2018-08-22 PROCEDURE — 76030000002 HC AMB SURG OR TIME FIRST 0.: Performed by: ORTHOPAEDIC SURGERY

## 2018-08-22 PROCEDURE — 76060000073 HC AMB SURG ANES FIRST 0.5 HR: Performed by: ORTHOPAEDIC SURGERY

## 2018-08-22 PROCEDURE — 77030020782 HC GWN BAIR PAWS FLX 3M -B

## 2018-08-22 PROCEDURE — 77030018836 HC SOL IRR NACL ICUM -A: Performed by: ORTHOPAEDIC SURGERY

## 2018-08-22 PROCEDURE — 74011250637 HC RX REV CODE- 250/637: Performed by: ORTHOPAEDIC SURGERY

## 2018-08-22 PROCEDURE — 74011250636 HC RX REV CODE- 250/636: Performed by: ANESTHESIOLOGY

## 2018-08-22 PROCEDURE — 76210000046 HC AMBSU PH II REC FIRST 0.5 HR: Performed by: ORTHOPAEDIC SURGERY

## 2018-08-22 PROCEDURE — 77030020754 HC CUF TRNQT 2BLA STRY -B: Performed by: ORTHOPAEDIC SURGERY

## 2018-08-22 RX ORDER — BUPIVACAINE HYDROCHLORIDE 5 MG/ML
30 INJECTION, SOLUTION EPIDURAL; INTRACAUDAL ONCE
Status: COMPLETED | OUTPATIENT
Start: 2018-08-22 | End: 2018-08-22

## 2018-08-22 RX ORDER — DIPHENHYDRAMINE HYDROCHLORIDE 50 MG/ML
12.5 INJECTION, SOLUTION INTRAMUSCULAR; INTRAVENOUS AS NEEDED
Status: DISCONTINUED | OUTPATIENT
Start: 2018-08-22 | End: 2018-08-22 | Stop reason: HOSPADM

## 2018-08-22 RX ORDER — HYDROCODONE BITARTRATE AND ACETAMINOPHEN 5; 325 MG/1; MG/1
1 TABLET ORAL
Qty: 15 TAB | Refills: 0 | Status: SHIPPED | OUTPATIENT
Start: 2018-08-22 | End: 2018-08-31

## 2018-08-22 RX ORDER — LIDOCAINE HYDROCHLORIDE 20 MG/ML
INJECTION, SOLUTION EPIDURAL; INFILTRATION; INTRACAUDAL; PERINEURAL AS NEEDED
Status: DISCONTINUED | OUTPATIENT
Start: 2018-08-22 | End: 2018-08-22 | Stop reason: HOSPADM

## 2018-08-22 RX ORDER — FENTANYL CITRATE 50 UG/ML
25 INJECTION, SOLUTION INTRAMUSCULAR; INTRAVENOUS
Status: DISCONTINUED | OUTPATIENT
Start: 2018-08-22 | End: 2018-08-22 | Stop reason: HOSPADM

## 2018-08-22 RX ORDER — DEXAMETHASONE SODIUM PHOSPHATE 4 MG/ML
INJECTION, SOLUTION INTRA-ARTICULAR; INTRALESIONAL; INTRAMUSCULAR; INTRAVENOUS; SOFT TISSUE AS NEEDED
Status: DISCONTINUED | OUTPATIENT
Start: 2018-08-22 | End: 2018-08-22 | Stop reason: HOSPADM

## 2018-08-22 RX ORDER — KETOROLAC TROMETHAMINE 30 MG/ML
INJECTION, SOLUTION INTRAMUSCULAR; INTRAVENOUS AS NEEDED
Status: DISCONTINUED | OUTPATIENT
Start: 2018-08-22 | End: 2018-08-22 | Stop reason: HOSPADM

## 2018-08-22 RX ORDER — SODIUM CHLORIDE 0.9 % (FLUSH) 0.9 %
5-10 SYRINGE (ML) INJECTION EVERY 8 HOURS
Status: DISCONTINUED | OUTPATIENT
Start: 2018-08-22 | End: 2018-08-22 | Stop reason: HOSPADM

## 2018-08-22 RX ORDER — SODIUM CHLORIDE, SODIUM LACTATE, POTASSIUM CHLORIDE, CALCIUM CHLORIDE 600; 310; 30; 20 MG/100ML; MG/100ML; MG/100ML; MG/100ML
125 INJECTION, SOLUTION INTRAVENOUS CONTINUOUS
Status: DISCONTINUED | OUTPATIENT
Start: 2018-08-22 | End: 2018-08-22 | Stop reason: HOSPADM

## 2018-08-22 RX ORDER — MIDAZOLAM HYDROCHLORIDE 1 MG/ML
1 INJECTION, SOLUTION INTRAMUSCULAR; INTRAVENOUS AS NEEDED
Status: DISCONTINUED | OUTPATIENT
Start: 2018-08-22 | End: 2018-08-22 | Stop reason: HOSPADM

## 2018-08-22 RX ORDER — MORPHINE SULFATE 10 MG/ML
2 INJECTION, SOLUTION INTRAMUSCULAR; INTRAVENOUS
Status: DISCONTINUED | OUTPATIENT
Start: 2018-08-22 | End: 2018-08-22 | Stop reason: HOSPADM

## 2018-08-22 RX ORDER — SODIUM CHLORIDE, SODIUM LACTATE, POTASSIUM CHLORIDE, CALCIUM CHLORIDE 600; 310; 30; 20 MG/100ML; MG/100ML; MG/100ML; MG/100ML
INJECTION, SOLUTION INTRAVENOUS
Status: DISCONTINUED | OUTPATIENT
Start: 2018-08-22 | End: 2018-08-22 | Stop reason: HOSPADM

## 2018-08-22 RX ORDER — SODIUM CHLORIDE, SODIUM LACTATE, POTASSIUM CHLORIDE, CALCIUM CHLORIDE 600; 310; 30; 20 MG/100ML; MG/100ML; MG/100ML; MG/100ML
75 INJECTION, SOLUTION INTRAVENOUS CONTINUOUS
Status: DISCONTINUED | OUTPATIENT
Start: 2018-08-22 | End: 2018-08-22 | Stop reason: HOSPADM

## 2018-08-22 RX ORDER — HYDROCODONE BITARTRATE AND ACETAMINOPHEN 5; 325 MG/1; MG/1
1 TABLET ORAL
Status: DISCONTINUED | OUTPATIENT
Start: 2018-08-22 | End: 2018-08-22 | Stop reason: HOSPADM

## 2018-08-22 RX ORDER — PROPOFOL 10 MG/ML
INJECTION, EMULSION INTRAVENOUS AS NEEDED
Status: DISCONTINUED | OUTPATIENT
Start: 2018-08-22 | End: 2018-08-22 | Stop reason: HOSPADM

## 2018-08-22 RX ORDER — PROPOFOL 10 MG/ML
INJECTION, EMULSION INTRAVENOUS
Status: DISCONTINUED | OUTPATIENT
Start: 2018-08-22 | End: 2018-08-22 | Stop reason: HOSPADM

## 2018-08-22 RX ORDER — FENTANYL CITRATE 50 UG/ML
INJECTION, SOLUTION INTRAMUSCULAR; INTRAVENOUS AS NEEDED
Status: DISCONTINUED | OUTPATIENT
Start: 2018-08-22 | End: 2018-08-22 | Stop reason: HOSPADM

## 2018-08-22 RX ORDER — ROPIVACAINE HYDROCHLORIDE 5 MG/ML
30 INJECTION, SOLUTION EPIDURAL; INFILTRATION; PERINEURAL ONCE
Status: DISCONTINUED | OUTPATIENT
Start: 2018-08-22 | End: 2018-08-22 | Stop reason: HOSPADM

## 2018-08-22 RX ORDER — SODIUM CHLORIDE 9 MG/ML
25 INJECTION, SOLUTION INTRAVENOUS CONTINUOUS
Status: DISCONTINUED | OUTPATIENT
Start: 2018-08-22 | End: 2018-08-22 | Stop reason: HOSPADM

## 2018-08-22 RX ORDER — LIDOCAINE HYDROCHLORIDE 10 MG/ML
0.1 INJECTION, SOLUTION EPIDURAL; INFILTRATION; INTRACAUDAL; PERINEURAL AS NEEDED
Status: DISCONTINUED | OUTPATIENT
Start: 2018-08-22 | End: 2018-08-22 | Stop reason: HOSPADM

## 2018-08-22 RX ORDER — FENTANYL CITRATE 50 UG/ML
50 INJECTION, SOLUTION INTRAMUSCULAR; INTRAVENOUS AS NEEDED
Status: DISCONTINUED | OUTPATIENT
Start: 2018-08-22 | End: 2018-08-22 | Stop reason: HOSPADM

## 2018-08-22 RX ORDER — MIDAZOLAM HYDROCHLORIDE 1 MG/ML
0.5 INJECTION, SOLUTION INTRAMUSCULAR; INTRAVENOUS
Status: DISCONTINUED | OUTPATIENT
Start: 2018-08-22 | End: 2018-08-22 | Stop reason: HOSPADM

## 2018-08-22 RX ORDER — SODIUM CHLORIDE 0.9 % (FLUSH) 0.9 %
5-10 SYRINGE (ML) INJECTION AS NEEDED
Status: DISCONTINUED | OUTPATIENT
Start: 2018-08-22 | End: 2018-08-22 | Stop reason: HOSPADM

## 2018-08-22 RX ORDER — ONDANSETRON 2 MG/ML
INJECTION INTRAMUSCULAR; INTRAVENOUS AS NEEDED
Status: DISCONTINUED | OUTPATIENT
Start: 2018-08-22 | End: 2018-08-22 | Stop reason: HOSPADM

## 2018-08-22 RX ORDER — ONDANSETRON 2 MG/ML
4 INJECTION INTRAMUSCULAR; INTRAVENOUS AS NEEDED
Status: DISCONTINUED | OUTPATIENT
Start: 2018-08-22 | End: 2018-08-22 | Stop reason: HOSPADM

## 2018-08-22 RX ADMIN — LIDOCAINE HYDROCHLORIDE 60 MG: 20 INJECTION, SOLUTION EPIDURAL; INFILTRATION; INTRACAUDAL; PERINEURAL at 09:13

## 2018-08-22 RX ADMIN — FENTANYL CITRATE 50 MCG: 50 INJECTION, SOLUTION INTRAMUSCULAR; INTRAVENOUS at 09:13

## 2018-08-22 RX ADMIN — FENTANYL CITRATE 25 MCG: 50 INJECTION, SOLUTION INTRAMUSCULAR; INTRAVENOUS at 09:18

## 2018-08-22 RX ADMIN — HYDROCODONE BITARTRATE AND ACETAMINOPHEN 1 TABLET: 5; 325 TABLET ORAL at 10:50

## 2018-08-22 RX ADMIN — PROPOFOL 160 MG: 10 INJECTION, EMULSION INTRAVENOUS at 09:13

## 2018-08-22 RX ADMIN — ONDANSETRON 4 MG: 2 INJECTION INTRAMUSCULAR; INTRAVENOUS at 09:22

## 2018-08-22 RX ADMIN — SODIUM CHLORIDE, SODIUM LACTATE, POTASSIUM CHLORIDE, CALCIUM CHLORIDE: 600; 310; 30; 20 INJECTION, SOLUTION INTRAVENOUS at 09:10

## 2018-08-22 RX ADMIN — DEXAMETHASONE SODIUM PHOSPHATE 4 MG: 4 INJECTION, SOLUTION INTRA-ARTICULAR; INTRALESIONAL; INTRAMUSCULAR; INTRAVENOUS; SOFT TISSUE at 09:20

## 2018-08-22 RX ADMIN — SODIUM CHLORIDE, SODIUM LACTATE, POTASSIUM CHLORIDE, AND CALCIUM CHLORIDE 125 ML/HR: 600; 310; 30; 20 INJECTION, SOLUTION INTRAVENOUS at 08:19

## 2018-08-22 RX ADMIN — PROPOFOL 75 MCG/KG/MIN: 10 INJECTION, EMULSION INTRAVENOUS at 09:18

## 2018-08-22 RX ADMIN — KETOROLAC TROMETHAMINE 30 MG: 30 INJECTION, SOLUTION INTRAMUSCULAR; INTRAVENOUS at 09:27

## 2018-08-22 NOTE — ANESTHESIA PREPROCEDURE EVALUATION
Anesthetic History   No history of anesthetic complications            Review of Systems / Medical History  Patient summary reviewed, nursing notes reviewed and pertinent labs reviewed    Pulmonary    COPD: mild               Neuro/Psych   Within defined limits           Cardiovascular  Within defined limits                     GI/Hepatic/Renal  Within defined limits              Endo/Other  Within defined limits           Other Findings              Physical Exam    Airway  Mallampati: II  TM Distance: > 6 cm  Neck ROM: normal range of motion   Mouth opening: Normal     Cardiovascular  Regular rate and rhythm,  S1 and S2 normal,  no murmur, click, rub, or gallop             Dental  No notable dental hx       Pulmonary  Breath sounds clear to auscultation               Abdominal  GI exam deferred       Other Findings            Anesthetic Plan    ASA: 2  Anesthesia type: general          Induction: Intravenous  Anesthetic plan and risks discussed with: Patient

## 2018-08-22 NOTE — IP AVS SNAPSHOT
[address] [address] 
[phone] Patient: Elisa Silveira MRN: [id number] :1965 About your hospitalization You were admitted on:  2018 You last received care in the:  [facility] 7 AMB SURGERY UNIT You were discharged on:  2018 Why you were hospitalized Your primary diagnosis was:  Not on File Follow-up Information Follow up With Details Comments Contact Info Rafael Melo MD Follow up in 2 week(s)  [address] [facility] [address] [place] [address] 
[phone] Isabella Betts MD   [facility] [address] [id number] 
[phone] Discharge Orders None A check pierre indicates which time of day the medication should be taken. My Medications START taking these medications Instructions Each Dose to Equal  
 Morning Noon Evening Bedtime HYDROcodone-acetaminophen 5-325 mg per tablet Commonly known as:  Lenon Gauze Your last dose was: Your next dose is: Take 1 Tab by mouth every six (6) hours as needed for Pain. Max Daily Amount: 4 Tabs. 1 Tab CONTINUE taking these medications Instructions Each Dose to Equal  
 Morning Noon Evening Bedtime  
 albuterol 90 mcg/actuation inhaler Commonly known as:  PROVENTIL HFA, VENTOLIN HFA, PROAIR HFA Your last dose was: Your next dose is: Take 2 Puffs by inhalation every four (4) hours as needed for Wheezing. 2 Puff  
    
   
   
   
  
 cholecalciferol 50,000 unit capsule Commonly known as:  VITAMIN D3 Your last dose was: Your next dose is: Take  by mouth. diphenhydrAMINE 25 mg capsule Commonly known as:  BENADRYL Your last dose was: Your next dose is: Take 2 Caps by mouth every six (6) hours as needed.   
 50 mg  
    
   
   
   
  
 doxepin 10 mg capsule Commonly known as:  SINEquan Your last dose was: Your next dose is: Take 10 mg by mouth nightly. 10 mg  
    
   
   
   
  
 gabapentin 300 mg capsule Commonly known as:  NEURONTIN Your last dose was: Your next dose is: Take 300 mg by mouth three (3) times daily. 300 mg  
    
   
   
   
  
 methocarbamol 750 mg tablet Commonly known as:  ROBAXIN Your last dose was: Your next dose is: Take 750 mg by mouth nightly. 750 mg  
    
   
   
   
  
 pantoprazole 40 mg tablet Commonly known as:  PROTONIX Your last dose was: Your next dose is: Take 40 mg by mouth as needed. 40 mg  
    
   
   
   
  
 potassium chloride SR 10 mEq tablet Commonly known as:  KLOR-CON 10 Your last dose was: Your next dose is: Take 10 mEq by mouth daily. 10 mEq [address] [place] [address] Your last dose was: Your next dose is: Take  by inhalation. TRAZODONE PO Your last dose was: Your next dose is: Take  by mouth. ZOLOFT 100 mg tablet Generic drug:  sertraline Your last dose was: Your next dose is: Take 100 mg by mouth two (2) times a day. 100 mg  
    
   
   
   
  
  
STOP taking these medications   
 traMADol 50 mg tablet Commonly known as:  ULTRAM  
   
  
  
  
Where to Get Your Medications Information on where to get these meds will be given to you by the nurse or doctor. ! Ask your nurse or doctor about these medications HYDROcodone-acetaminophen 5-325 mg per tablet Opioid Education  Prescription Opioids: What You Need to Know: 
 
Prescription opioids can be used to help relieve moderate-to-severe pain and are often prescribed following a surgery or injury, or for certain health conditions. These medications can be an important part of treatment but also come with serious risks. Opioids are strong pain medicines. Examples include hydrocodone, oxycodone, fentanyl, and morphine. Heroin is an example of an illegal opioid. It is important to work with your health care provider to make sure you are getting the safest, most effective care. WHAT ARE THE RISKS AND SIDE EFFECTS OF OPIOID USE? Prescription opioids carry serious risks of addiction and overdose, especially with prolonged use. An opioid overdose, often marked by slow breathing, can cause sudden death. The use of prescription opioids can have a number of side effects as well, even when taken as directed. · Tolerance-meaning you might need to take more of a medication for the same pain relief · Physical dependence-meaning you have symptoms of withdrawal when the medication is stopped. Withdrawal symptoms can include nausea, sweating, chills, diarrhea, stomach cramps, and muscle aches. Withdrawal can last up to several weeks, depending on which drug you took and how long you took it. · Increased sensitivity to pain · Constipation · Nausea, vomiting, and dry mouth · Sleepiness and dizziness · Confusion · Depression · Low levels of testosterone that can result in lower sex drive, energy, and strength · Itching and sweating RISKS ARE GREATER WITH:      
· History of drug misuse, substance use disorder, or overdose · Mental health conditions (such as depression or anxiety) · Sleep apnea · Older age (72 years or older) · Pregnancy Avoid alcohol while taking prescription opioids. Also, unless specifically advised by your health care provider, medications to avoid include: · Benzodiazepines (such as Xanax or Valium) · Muscle relaxants (such as Soma or Flexeril) · Hypnotics (such as Ambien or Lunesta) · Other prescription opioids KNOW YOUR OPTIONS Talk to your health care provider about ways to manage your pain that don't involve prescription opioids. Some of these options may actually work better and have fewer risks and side effects. Options may include: 
· Pain relievers such as acetaminophen, ibuprofen, and naproxen · Some medications that are also used for depression or seizures · Physical therapy and exercise · Counseling to help patients learn how to cope better with triggers of pain and stress. · Application of heat or cold compress · Massage therapy · Relaxation techniques Be Informed Make sure you know the name of your medication, how much and how often to take it, and its potential risks & side effects. IF YOU ARE PRESCRIBED OPIOIDS FOR PAIN: 
· Never take opioids in greater amounts or more often than prescribed. Remember the goal is not to be pain-free but to manage your pain at a tolerable level. · Follow up with your primary care provider to: · Work together to create a plan on how to manage your pain. · Talk about ways to help manage your pain that don't involve prescription opioids. · Talk about any and all concerns and side effects. · Help prevent misuse and abuse. · Never sell or share prescription opioids · Help prevent misuse and abuse. · Store prescription opioids in a secure place and out of reach of others (this may include visitors, children, friends, and family). · Safely dispose of unused/unwanted prescription opioids: Find your community drug take-back program or your pharmacy mail-back program, or flush them down the toilet, following guidance from the Food and Drug Administration (www.fda.gov/Drugs/ResourcesForYou). · Visit www.cdc.gov/drugoverdose to learn about the risks of opioid abuse and overdose. · If you believe you may be struggling with addiction, tell your health care provider and ask for guidance or call [employer] at 4-822-027-PWHZ. Discharge Instructions Dr. Pedro Perales Carpal Tunnel Postoperative Instructions 1. Please maintain the dressing placed at surgery for 5 days. You may remove it in 5 days. Please keep the dressing clean and dry for 5 days. In 5 days you may get the wound wet and then cover it with a bandaid. If you have any questions or problems with your dressing, please call our office at [phone]. 
2. Please elevate the operative extremity to the level of the heart to keep swelling at a minimum. You may get up to move around, but when seated please keep the extremity elevated as much as possible. This will decrease swelling and postoperative pain. You may do activities as tolerated. 3. You may ice your arm/hand 5 times a day for 20 minutes at a time. 4. A prescription for pain medication is provided. The key to pain control is staying ahead of the pain. For the first 48-72 hours after your surgery, you may want to take your pain medication on a regular schedule. After that, you may only need it on an as needed basis. 5. Signs and symptoms of infection include: redness, increased pain, increased swelling not relieved by elevation, drainage, fever, or chills, If you develop any of these symptoms, call the office at [phone]. 6. Please Follow-up at my office 14 days after surgery or when specified at your pre-operative appointment. DISCHARGE SUMMARY from Nurse You had 30 mg of IV toradol at around 9:30 AM. This medication is similar to ibuprofen/Motrin. Please do not have any additional ibuprofen/Motrin for 6 hours, or no sooner than 3:30 PM. PATIENT INSTRUCTIONS: 
 
After general anesthesia or intravenous sedation, for 24 hours or while taking prescription Narcotics: · Limit your activities · Do not drive and operate hazardous machinery · Do not make important personal or business decisions · Do  not drink alcoholic beverages · If you have not urinated within 8 hours after discharge, please contact your surgeon on call. Report the following to your surgeon: 
· Excessive pain, swelling, redness or odor of or around the surgical area · Temperature over 101. · Nausea and vomiting lasting longer than 4 hours or if unable to take medications · Any signs of decreased circulation or nerve impairment to extremity: change in color, persistent  numbness, tingling, coldness or increase pain · Any questions If you experience any of the following symptoms as noted above, please follow up with Dr. Cat Tierney. What to do at Home: 
Recommended activity: See surgical instructions above from Dr. Cat Tierney. Recommended diet: Resume regular diet as tolerated. Nothing heavy, greasy, fatty, or fried. Please make sure you have food on your stomach prior to taking Norco.  
 
 
*  Please give a list of your current medications to your Primary Care Provider. *  Please update this list whenever your medications are discontinued, doses are changed, or new medications (including over-the-counter products) are added. *  Please carry medication information at all times in case of emergency situations. Community Education: These are general instructions for a healthy lifestyle: No smoking/ No tobacco products/ Avoid exposure to second hand smoke. Surgeon General's Warning:  Quitting smoking now greatly reduces serious risk to your health. Obesity, smoking, and sedentary lifestyle greatly increases your risk for illness. A healthy diet, regular physical exercise & weight monitoring are important for maintaining a healthy lifestyle You may be retaining fluid if you have a history of heart failure or if you experience any of the following symptoms:  Weight gain of 3 pounds or more overnight or 5 pounds in a week, increased swelling in our hands or feet or shortness of breath while lying flat in bed.   Please call your doctor as soon as you notice any of these symptoms; do not wait until your next office visit. Recognize signs and symptoms of STROKE: 
 
F-face looks uneven A-arms unable to move or move unevenly S-speech slurred or non-existent T-time-call 911 as soon as signs and symptoms begin-DO NOT go Back to bed or wait to see if you get better-TIME IS BRAIN. Warning Signs of HEART ATTACK Call 911 if you have these symptoms: 
? Chest discomfort. Most heart attacks involve discomfort in the center of the chest that lasts more than a few minutes, or that goes away and comes back. It can feel like uncomfortable pressure, squeezing, fullness, or pain. ? Discomfort in other areas of the upper body. Symptoms can include pain or discomfort in one or both arms, the back, neck, jaw, or stomach. ? Shortness of breath with or without chest discomfort. ? Other signs may include breaking out in a cold sweat, nausea, or lightheadedness. Don't wait more than five minutes to call [address]! Fast action can save your life. Calling 911 is almost always the fastest way to get lifesaving treatment. Emergency Medical Services staff can begin treatment when they arrive  up to an hour sooner than if someone gets to the hospital by car. The discharge information has been reviewed with the patient and caregiver. The patient and caregiver verbalized understanding. Discharge medications reviewed with the patient and caregiver and appropriate educational materials and side effects teaching were provided. ___________________________________________________________________________________________________________________________________ Introducing \[facility]\"" & HEALTH SERVICES! [facility] introduces [employer] patient portal. Now you can access parts of your medical record, email your doctor's office, and request medication refills online. 1. In your internet browser, go to [email or web address] 2. Click on the First Time User? Click Here link in the Sign In box. You will see the New Member Sign Up page. 3. Enter your [employer] Access Code exactly as it appears below. You will not need to use this code after youve completed the sign-up process. If you do not sign up before the expiration date, you must request a new code. · [employer] Access Code: [email or web address] Expires: 9/23/2018  1:27 PM 
 
4. Enter the last four digits of your Social Security Number (xxxx) and Date of Birth (mm/dd/yyyy) as indicated and click Submit. You will be taken to the next sign-up page. 5. Create a [employer] ID. This will be your [employer] login ID and cannot be changed, so think of one that is secure and easy to remember. 6. Create a [employer] password. You can change your password at any time. 7. Enter your Password Reset Question and Answer. This can be used at a later time if you forget your password. 8. Enter your e-mail address. You will receive e-mail notification when new information is available in [address] 9. Click Sign Up. You can now view and download portions of your medical record. 10. Click the Download Summary menu link to download a portable copy of your medical information. If you have questions, please visit the Frequently Asked Questions section of the [employer] website. Remember, [employer] is NOT to be used for urgent needs. For medical emergencies, dial 911. Now available from your iPhone and Android! Introducing [employer] As a [employer] patient, I wanted to make you aware of our electronic visit tool called Jay Jay Jara. [employer] 24/7 allows you to connect within minutes with a medical provider 24 hours a day, seven days a week via a mobile device or tablet or logging into a secure website from your computer. You can access Jay Jay Jara from anywhere in the [place].  
 
A virtual visit might be right for you when you have a simple condition and feel like you just dont want to get out of bed, or cant get away from work for an appointment, when your regular [employer] provider is not available (evenings, weekends or holidays), or when youre out of town and need minor care. Electronic visits cost only $49 and if the [employer] 24/7 provider determines a prescription is needed to treat your condition, one can be electronically transmitted to a nearby pharmacy*. Please take a moment to enroll today if you have not already done so. The enrollment process is free and takes just a few minutes. To enroll, please download the [employer] 24/7 hanh to your tablet or phone, or visit [email or web address] to enroll on your computer. And, as an [address] [place] [address] patient with a [employer] account, the results of your visits will be scanned into your electronic medical record and your primary care provider will be able to view the scanned results. We urge you to continue to see your regular [employer] provider for your ongoing medical care. And while your primary care provider may not be the one available when you seek a [email or web address] virtual visit, the peace of mind you get from getting a real diagnosis real time can be priceless. For more information on [employer], view our Frequently Asked Questions (FAQs) at [email or web address]. Sincerely, 
 
Jarred Johnson MD 
Chief Medical Officer [employer] *:  certain medications cannot be prescribed via [employer] Providers Seen During Your Hospitalization Provider Specialty Primary office phone Adrian Malik MD Orthopedic Surgery [phone] Your Primary Care Physician (PCP) Primary Care Physician Office Phone Office Fax [facility], [address] [place] [address] [phone] You are allergic to the following Allergen Reactions Latex Rash  
    
 Sulfa (Sulfonamide Antibiotics) Rash Recent Documentation Height Weight BMI OB Status Smoking Status 1.651 m 90.7 kg 33.28 kg/m2 Postmenopausal Current Every Day Smoker Emergency Contacts Name Discharge Info Relation Home Work Mobile [place] DISCHARGE CAREGIVER [3] Spouse [3] [phone] Patient Belongings The following personal items are in your possession at time of discharge: 
  Dental Appliances: None  Visual Aid: Glasses             Clothing:  (clothes in locker) Please provide this summary of care documentation to your next provider. Signatures-by signing, you are acknowledging that this After Visit Summary has been reviewed with you and you have received a copy. Patient Signature:  ____________________________________________________________ Date:  ____________________________________________________________  
  
[facility] Provider Signature:  ____________________________________________________________ Date:  ____________________________________________________________

## 2018-08-22 NOTE — ANESTHESIA POSTPROCEDURE EVALUATION
Post-Anesthesia Evaluation and Assessment    Patient: Eugenie Petty MRN: [id number]  SSN: [id number]    YOB: 1965  Age: 46 y.o. Sex: female       Cardiovascular Function/Vital Signs  Visit Vitals    /77    Pulse 74    Temp 36.4 °C (97.5 °F)    Resp 18    Ht 5' 5\" (1.651 m)    Wt 90.7 kg (200 lb)    SpO2 95%    BMI 33.28 kg/m2       Patient is status post general anesthesia for Procedure(s):  RIGHT ENDOSCOPIC CARPAL TUNNEL RELEASE . Nausea/Vomiting: None    Postoperative hydration reviewed and adequate. Pain:  Pain Scale 1: Numeric (0 - 10) (08/22/18 1040)  Pain Intensity 1: 5 (08/22/18 1040)   Managed    Neurological Status:   Neuro (WDL): Within Defined Limits (08/22/18 1015)  Neuro  Neurologic State: Drowsy (08/22/18 1015)  Orientation Level: Oriented to person;Oriented to situation (08/22/18 1015)  LUE Motor Response: Purposeful (08/22/18 1015)  LLE Motor Response: Purposeful (08/22/18 1015)  RUE Motor Response: Purposeful (08/22/18 1015)  RLE Motor Response: Purposeful (08/22/18 1015)   At baseline    Mental Status and Level of Consciousness: Arousable    Pulmonary Status:   O2 Device: Room air (08/22/18 1030)   Adequate oxygenation and airway patent    Complications related to anesthesia: None    Post-anesthesia assessment completed.  No concerns    Signed By: Mark John MD     August 22, 2018

## 2018-08-22 NOTE — PERIOP NOTES
Discharge instructions reviewed with patient. Opportunity for questions and clarification provided. Patient verbalized understanding of discharge instructions and had no additional questions or concerns. Patient discharged by RN via wheelchair to friend's car/care and prior home environment.

## 2018-08-22 NOTE — INTERVAL H&P NOTE
H&P Update:  Melissa Dumont was seen and examined. History and physical has been reviewed. The patient has been examined.  There have been no significant clinical changes since the completion of the originally dated History and Physical.    Signed By: Roverto Segura MD     August 22, 2018 8:58 AM

## 2018-08-22 NOTE — OP NOTES
PREOPERATIVE DIAGNOSIS: Right carpal tunnel syndrome. POSTOPERATIVE DIAGNOSIS: Right carpal tunnel syndrome. PROCEDURES PERFORMED: Endoscopic right carpal tunnel release. SURGEON: Isaak Haas. Marva Malik MD     ANESTHESIA: General.     ESTIMATED BLOOD LOSS: Minimal.     SPECIMENS REMOVED: None. COMPLICATIONS: None. IMPLANTS: None. INDICATIONS FOR PROCEDURE: This is an 46year-old female with   electrodiagnostic and clinical evidence of carpal tunnel syndrome. She has failed   conservative treatment, and wishes to proceed with endoscopic carpal tunnel   release. After discussion of the risks, benefits, potential complications   and alternatives to surgery, she has elected to proceed. DESCRIPTION OF PROCEDURE: The patient was identified in the preoperative   holding area. Informed consent was obtained, and the operative site was   marked. The patient was then transferred to the OR, and placed supine on the   operating table. After induction of general anesthesia, the right upper   extremity was sterilely prepped and draped in the usual fashion. A surgical   time-out was held, and the operative site was confirmed. Preoperative   antibiotics were not given. After Esmarch exsanguination, the tourniquet was   elevated to 250 mmHg. A transverse incision was made just proximal to the   wrist flexion crease. Dissection was carried down through skin and   subcutaneous tissues, controlling bleeding with electrocautery. The   antebrachial fascia was incised sharply with a 15-blade. The proximal   aspect of the antebrachial fascia was then divided under direct   visualization. The carpal canal was entered - first with a synovial rasp,   and then serial dilators. The endoscope was placed. Good visualization of   the transverse carpal ligament was easily obtained. The transverse carpal   ligament was then sharply divided, starting distally and working   proximally.  The 50% distal aspect of the ligament was transected. The   endoscope was then placed back into the carpal canal. Good release was   evident. The remaining 50% was then transected with the endoscope. The   endoscope was removed. The wound was thoroughly irrigated. The area of the   incision was anesthetized with 0.5% Marcaine. The wound was then closed   with 4-0 nylon sutures. Sterile dressings were applied. The tourniquet was let down and brisk cap refill returned to the digits. She tolerated the entire procedure well without complications.

## 2018-08-22 NOTE — DISCHARGE INSTRUCTIONS
Dr. Razia Tubbs Carpal Tunnel Postoperative Instructions    1. Please maintain the dressing placed at surgery for 5 days. You may remove it in 5 days. Please keep the dressing clean and dry for 5 days. In 5 days you may get the wound wet and then cover it with a bandaid. If you have any questions or problems with your dressing, please call our office at [phone].  2. Please elevate the operative extremity to the level of the heart to keep swelling at a minimum. You may get up to move around, but when seated please keep the extremity elevated as much as possible. This will decrease swelling and postoperative pain. You may do activities as tolerated. 3. You may ice your arm/hand 5 times a day for 20 minutes at a time. 4. A prescription for pain medication is provided. The key to pain control is staying ahead of the pain. For the first 48-72 hours after your surgery, you may want to take your pain medication on a regular schedule. After that, you may only need it on an as needed basis. 5. Signs and symptoms of infection include: redness, increased pain, increased swelling not relieved by elevation, drainage, fever, or chills, If you develop any of these symptoms, call the office at [phone]. 6. Please Follow-up at my office 14 days after surgery or when specified at your pre-operative appointment. DISCHARGE SUMMARY from Nurse    You had 30 mg of IV toradol at around 9:30 AM. This medication is similar to ibuprofen/Motrin.  Please do not have any additional ibuprofen/Motrin for 6 hours, or no sooner than 3:30 PM.     PATIENT INSTRUCTIONS:    After general anesthesia or intravenous sedation, for 24 hours or while taking prescription Narcotics:  · Limit your activities  · Do not drive and operate hazardous machinery  · Do not make important personal or business decisions  · Do  not drink alcoholic beverages  · If you have not urinated within 8 hours after discharge, please contact your surgeon on call.    Report the following to your surgeon:  · Excessive pain, swelling, redness or odor of or around the surgical area  · Temperature over 101. · Nausea and vomiting lasting longer than 4 hours or if unable to take medications  · Any signs of decreased circulation or nerve impairment to extremity: change in color, persistent  numbness, tingling, coldness or increase pain  · Any questions  If you experience any of the following symptoms as noted above, please follow up with Dr. Dwayne Serrano. What to do at Home:  Recommended activity: See surgical instructions above from Dr. Dwayne Serrano. Recommended diet: Resume regular diet as tolerated. Nothing heavy, greasy, fatty, or fried. Please make sure you have food on your stomach prior to taking Norco.       *  Please give a list of your current medications to your Primary Care Provider. *  Please update this list whenever your medications are discontinued, doses are changed, or new medications (including over-the-counter products) are added. *  Please carry medication information at all times in case of emergency situations. Community Education:    These are general instructions for a healthy lifestyle:    No smoking/ No tobacco products/ Avoid exposure to second hand smoke. Surgeon General's Warning:  Quitting smoking now greatly reduces serious risk to your health. Obesity, smoking, and sedentary lifestyle greatly increases your risk for illness. A healthy diet, regular physical exercise & weight monitoring are important for maintaining a healthy lifestyle    You may be retaining fluid if you have a history of heart failure or if you experience any of the following symptoms:  Weight gain of 3 pounds or more overnight or 5 pounds in a week, increased swelling in our hands or feet or shortness of breath while lying flat in bed. Please call your doctor as soon as you notice any of these symptoms; do not wait until your next office visit.     Recognize signs and symptoms of STROKE:    F-face looks uneven  A-arms unable to move or move unevenly  S-speech slurred or non-existent  T-time-call 911 as soon as signs and symptoms begin-DO NOT go       Back to bed or wait to see if you get better-TIME IS BRAIN. Warning Signs of HEART ATTACK     Call 911 if you have these symptoms:   Chest discomfort. Most heart attacks involve discomfort in the center of the chest that lasts more than a few minutes, or that goes away and comes back. It can feel like uncomfortable pressure, squeezing, fullness, or pain.  Discomfort in other areas of the upper body. Symptoms can include pain or discomfort in one or both arms, the back, neck, jaw, or stomach.  Shortness of breath with or without chest discomfort.  Other signs may include breaking out in a cold sweat, nausea, or lightheadedness. Don't wait more than five minutes to call 911 - MINUTES MATTER! Fast action can save your life. Calling 911 is almost always the fastest way to get lifesaving treatment. Emergency Medical Services staff can begin treatment when they arrive -- up to an hour sooner than if someone gets to the hospital by car. The discharge information has been reviewed with the patient and caregiver. The patient and caregiver verbalized understanding. Discharge medications reviewed with the patient and caregiver and appropriate educational materials and side effects teaching were provided.   ___________________________________________________________________________________________________________________________________

## 2018-08-22 NOTE — ROUTINE PROCESS
Patient: Crys Harris MRN: [id number]  SSN: [id number]   YOB: 1965  Age: 46 y.o. Sex: female     Patient is status post Procedure(s):  RIGHT ENDOSCOPIC CARPAL TUNNEL RELEASE . Surgeon(s) and Role:     * Bruno Nazario MD - Primary    Local/Dose/Irrigation:  See [facility]                  Peripheral IV 08/22/18 Left Forearm (Active)            Airway - Endotracheal Tube 08/22/18 Oral (Active)                   Dressing/Packing:  Wound Hand Right-DRESSING TYPE: 4 x 4;Cast padding;Elastic bandage; Xeroform (08/22/18 0700)  Splint/Cast:  ]    Other:

## 2018-08-31 ENCOUNTER — HOSPITAL ENCOUNTER (EMERGENCY)
Age: 53
Discharge: HOME | End: 2018-08-31
Attending: EMERGENCY MEDICINE
Payer: MEDICAID

## 2018-08-31 VITALS
TEMPERATURE: 97.3 F | DIASTOLIC BLOOD PRESSURE: 72 MMHG | SYSTOLIC BLOOD PRESSURE: 147 MMHG | WEIGHT: 202 LBS | OXYGEN SATURATION: 97 % | HEIGHT: 65 IN | RESPIRATION RATE: 18 BRPM | HEART RATE: 71 BPM | BODY MASS INDEX: 33.66 KG/M2

## 2018-08-31 DIAGNOSIS — L03.119 CELLULITIS OF WRIST: Primary | ICD-10-CM

## 2018-08-31 PROCEDURE — 99282 EMERGENCY DEPT VISIT SF MDM: CPT

## 2018-08-31 RX ORDER — IBUPROFEN 600 MG/1
600 TABLET ORAL
Qty: 20 TAB | Refills: 0 | Status: SHIPPED | OUTPATIENT
Start: 2018-08-31

## 2018-08-31 RX ORDER — CEPHALEXIN 500 MG/1
500 CAPSULE ORAL 4 TIMES DAILY
Qty: 28 CAP | Refills: 0 | Status: SHIPPED | OUTPATIENT
Start: 2018-08-31 | End: 2018-09-07

## 2018-08-31 NOTE — DISCHARGE INSTRUCTIONS
Cellulitis: Care Instructions  Your Care Instructions    Cellulitis is a skin infection caused by bacteria, most often strep or staph. It often occurs after a break in the skin from a scrape, cut, bite, or puncture, or after a rash. Cellulitis may be treated without doing tests to find out what caused it. But your doctor may do tests, if needed, to look for a specific bacteria, like methicillin-resistant Staphylococcus aureus (MRSA). The doctor has checked you carefully, but problems can develop later. If you notice any problems or new symptoms, get medical treatment right away. Follow-up care is a key part of your treatment and safety. Be sure to make and go to all appointments, and call your doctor if you are having problems. It's also a good idea to know your test results and keep a list of the medicines you take. How can you care for yourself at home? · Take your antibiotics as directed. Do not stop taking them just because you feel better. You need to take the full course of antibiotics. · Prop up the infected area on pillows to reduce pain and swelling. Try to keep the area above the level of your heart as often as you can. · If your doctor told you how to care for your wound, follow your doctor's instructions. If you did not get instructions, follow this general advice:  ¨ Wash the wound with clean water 2 times a day. Don't use hydrogen peroxide or alcohol, which can slow healing. ¨ You may cover the wound with a thin layer of petroleum jelly, such as Vaseline, and a nonstick bandage. ¨ Apply more petroleum jelly and replace the bandage as needed. · Be safe with medicines. Take pain medicines exactly as directed. ¨ If the doctor gave you a prescription medicine for pain, take it as prescribed. ¨ If you are not taking a prescription pain medicine, ask your doctor if you can take an over-the-counter medicine.   To prevent cellulitis in the future  · Try to prevent cuts, scrapes, or other injuries to your skin. Cellulitis most often occurs where there is a break in the skin. · If you get a scrape, cut, mild burn, or bite, wash the wound with clean water as soon as you can to help avoid infection. Don't use hydrogen peroxide or alcohol, which can slow healing. · If you have swelling in your legs (edema), support stockings and good skin care may help prevent leg sores and cellulitis. · Take care of your feet, especially if you have diabetes or other conditions that increase the risk of infection. Wear shoes and socks. Do not go barefoot. If you have athlete's foot or other skin problems on your feet, talk to your doctor about how to treat them. When should you call for help? Call your doctor now or seek immediate medical care if:    · You have signs that your infection is getting worse, such as:  ¨ Increased pain, swelling, warmth, or redness. ¨ Red streaks leading from the area. ¨ Pus draining from the area. ¨ A fever.     · You get a rash.    Watch closely for changes in your health, and be sure to contact your doctor if:    · You do not get better as expected. Where can you learn more? Go to http://salvador-ricky.info/. Mikel Patiño in the search box to learn more about \"Cellulitis: Care Instructions. \"  Current as of: May 10, 2017  Content Version: 11.7  © 7518-3982 [employer]. Care instructions adapted under license by [employer] (which disclaims liability or warranty for this information). If you have questions about a medical condition or this instruction, always ask your healthcare professional. [place] [address] any warranty or liability for your use of this information.

## 2018-08-31 NOTE — ED PROVIDER NOTES
EMERGENCY DEPARTMENT HISTORY AND PHYSICAL EXAM 
 
Date: 8/31/2018 Patient Name: Destin Aranda History of Presenting Illness Chief Complaint Patient presents with  Wound Check  
  pt had surgery on 08/22/18 on rt wrist at [facility]. History Provided By: Patient Chief Complaint: skin problem Duration: past few days Timing:  Acute Location: r wrist 
Quality: Aching and Burning Severity: 10 out of 10 Modifying Factors: moving wrist worsens pain Associated Symptoms: denies any other associated signs or symptoms HPI: Destin Aranda is a 46 y.o. female with a PMH of No significant past medical history who presents with R wrist redness states she had carpal tunnel surgery august 22 . States she has not been able to keep incision dry. and noted redness around incision site. Denies drainage. Denies fever. PCP: Emely Cool MD 
 
Current Outpatient Prescriptions Medication Sig Dispense Refill  cephALEXin (KEFLEX) 500 mg capsule Take 1 Cap by mouth four (4) times daily for 7 days. 28 Cap 0  ibuprofen (MOTRIN) 600 mg tablet Take 1 Tab by mouth every six (6) hours as needed for Pain. 20 Tab 0  
 gabapentin (NEURONTIN) 300 mg capsule Take 300 mg by mouth three (3) times daily.  methocarbamol (ROBAXIN) 750 mg tablet Take 750 mg by mouth nightly.  potassium chloride SR (KLOR-CON 10) 10 mEq tablet Take 10 mEq by mouth daily.  doxepin (SINEQUAN) 10 mg capsule Take 10 mg by mouth nightly.  TRAZODONE HCL (TRAZODONE PO) Take  by mouth.  pantoprazole (PROTONIX) 40 mg tablet Take 40 mg by mouth as needed.  Cholecalciferol, Vitamin D3, 50,000 unit cap Take  by mouth.  TIOTROPIUM BROMIDE (SPIRIVA WITH HANDIHALER IN) Take  by inhalation.  sertraline (ZOLOFT) 100 mg tablet Take 100 mg by mouth two (2) times a day. Past History Past Medical History: 
Past Medical History:  
Diagnosis Date  Acid reflux  Arthritis KNEE, BACK  COPD  Depression  Other ill-defined conditions(799.89) Chronic Back Pain  Psychiatric disorder   
 bipolar Past Surgical History: 
Past Surgical History:  
Procedure Laterality Date [address] UNLISTED Cholecystectomy  HX CHOLECYSTECTOMY  HX GYN    
 oophorectomy, left  HX ORTHOPAEDIC Left CHILD Fracture repair/knee  HX ORTHOPAEDIC  TEEN  
 REPAIR FX RIGHT FOREARM Family History: 
Family History Problem Relation Age of Onset  Hypertension Mother  Diabetes Mother  Anesth Problems Neg Hx Social History: 
Social History Substance Use Topics  Smoking status: Current Every Day Smoker Packs/day: 0.50 Years: 46.00 Types: Cigarettes  Smokeless tobacco: Never Used  Alcohol use Yes Comment: 2 DRINKS PER MONTH Allergies: Allergies Allergen Reactions  Latex Rash  Sulfa (Sulfonamide Antibiotics) Rash Review of Systems Review of Systems Constitutional: Negative for fatigue and fever. Respiratory: Negative for shortness of breath and wheezing. Cardiovascular: Negative for chest pain and palpitations. Gastrointestinal: Negative for abdominal pain. Musculoskeletal: Negative for arthralgias (left wrist pain), myalgias, neck pain and neck stiffness. Skin: Negative for pallor and rash. Neurological: Negative for dizziness, tremors, weakness and headaches. Hematological: Negative for adenopathy. Psychiatric/Behavioral: Negative for agitation and behavioral problems. All other systems reviewed and are negative. Physical Exam  
 
Vitals:  
 08/31/18 1164 BP: 147/72 Pulse: 71 Resp: 18 Temp: 97.3 °F (36.3 °C) SpO2: 97% Weight: 91.6 kg (202 lb) Height: 5' 5\" (1.651 m) Physical Exam  
Constitutional: She is oriented to person, place, and time. She appears well-developed and well-nourished. No distress. HENT:  
Head: Normocephalic and atraumatic. Right Ear: External ear normal.  
Left Ear: External ear normal.  
Nose: Nose normal.  
Mouth/Throat: Oropharynx is clear and moist.  
Eyes: Conjunctivae are normal.  
Neck: Normal range of motion. Neck supple. Cardiovascular: Normal rate and regular rhythm. Pulmonary/Chest: Effort normal and breath sounds normal. No respiratory distress. She has no wheezes. Abdominal: Soft. Bowel sounds are normal. There is no tenderness. Musculoskeletal: Normal range of motion. Hands: 
Incision healing well 2cm area of surrounding erythema Lymphadenopathy:  
  She has no cervical adenopathy. Neurological: She is alert and oriented to person, place, and time. No cranial nerve deficit. Coordination normal.  
Skin: Skin is warm and dry. No rash noted. Psychiatric: She has a normal mood and affect. Her behavior is normal. Judgment and thought content normal.  
Nursing note and vitals reviewed. Diagnostic Study Results Labs - No results found for this or any previous visit (from the past 12 hour(s)). Radiologic Studies - No orders to display CT Results  (Last 48 hours) None CXR Results  (Last 48 hours) None Medical Decision Making I am the first provider for this patient. I reviewed the vital signs, available nursing notes, past medical history, past surgical history, family history and social history. Vital Signs-Reviewed the patient's vital signs. Records Reviewed: Nursing Notes and Old Medical Records ED Course:  
sstable Disposition: 
home DISCHARGE NOTE:  
 
 
  Care plan outlined and precautions discussed. Patient has no new complaints, changes, or physical findings. t. All medications were reviewed with the patient; will d/c home with keflex motrin. All of pt's questions and concerns were addressed.  Patient was instructed and agrees to follow up with surgeon, as well as to return to the ED upon further deterioration. Patient is ready to go home. Follow-up Information Follow up With Details Comments Contact Info Briseida Naqvi MD In 2 days  [address] [facility] [address] [id number] 
[phone] Discharge Medication List as of 8/31/2018  9:26 AM  
  
START taking these medications Details  
cephALEXin (KEFLEX) 500 mg capsule Take 1 Cap by mouth four (4) times daily for 7 days. , Normal, Disp-28 Cap, R-0  
  
ibuprofen (MOTRIN) 600 mg tablet Take 1 Tab by mouth every six (6) hours as needed for Pain., Normal, Disp-20 Tab, R-0  
  
  
CONTINUE these medications which have NOT CHANGED Details HYDROcodone-acetaminophen (NORCO) 5-325 mg per tablet Take 1 Tab by mouth every six (6) hours as needed for Pain. Max Daily Amount: 4 Tabs., Print, Disp-15 Tab, R-0  
  
gabapentin (NEURONTIN) 300 mg capsule Take 300 mg by mouth three (3) times daily. , Historical Med  
  
methocarbamol (ROBAXIN) 750 mg tablet Take 750 mg by mouth nightly., Historical Med  
  
potassium chloride SR (KLOR-CON 10) 10 mEq tablet Take 10 mEq by mouth daily. , Historical Med  
  
doxepin (SINEQUAN) 10 mg capsule Take 10 mg by mouth nightly., Historical Med  
  
albuterol (PROVENTIL HFA, VENTOLIN HFA, PROAIR HFA) 90 mcg/actuation inhaler Take 2 Puffs by inhalation every four (4) hours as needed for Wheezing., Normal, Disp-1 Inhaler, R-0  
  
TRAZODONE HCL (TRAZODONE PO) Take  by mouth., Historical Med  
  
diphenhydrAMINE (BENADRYL) 25 mg capsule Take 2 Caps by mouth every six (6) hours as needed., Normal, Disp-12 Cap, R-0  
  
pantoprazole (PROTONIX) 40 mg tablet Take 40 mg by mouth as needed., Historical Med Cholecalciferol, Vitamin D3, 50,000 unit cap Take  by mouth., Historical Med  
  
TIOTROPIUM BROMIDE (SPIRIVA WITH HANDIHALER IN) Take  by inhalation.   , Historical Med  
  
sertraline (ZOLOFT) 100 mg tablet Take 100 mg by mouth two (2) times a day., Historical Med  
  
  
 
 
 Provider Notes (Medical Decision Making): DDX cellulitis abscess Procedures: 
Procedures Diagnosis Clinical Impression: 1.  Cellulitis of wrist

## 2018-08-31 NOTE — ED NOTES
..Discharge summary and discharge medications reviewed with patient and appropriate educational materials and side effects teaching were provided. patient  Given 0 paper prescriptions and 2 electronic prescriptions sent to pt's listed pharmacy. Patient verbalized understanding of the importance of discussing medications with his or her physician or clinic they will be following up with. No si/s of acute distress prior to discharge. Patient offered wheelchair from treatment area to hospital entrance, patient declined wheelchair. Discharged home.

## 2018-08-31 NOTE — ED NOTES
Pt presents to ED complaining of pain at her incision site on her right wrist from a prior surgery for carpal tunnel. Denies drainage. Not taking any medications for pain. Pt is alert, orientated and appropriate.

## 2019-05-07 ENCOUNTER — HOSPITAL ENCOUNTER (OUTPATIENT)
Dept: GENERAL RADIOLOGY | Age: 54
Discharge: HOME | End: 2019-05-07
Payer: MEDICAID

## 2019-05-07 ENCOUNTER — HOSPITAL ENCOUNTER (OUTPATIENT)
Dept: MAMMOGRAPHY | Age: 54
Discharge: HOME | End: 2019-05-07
Payer: MEDICAID

## 2019-05-07 DIAGNOSIS — M54.9 BACK PAIN: ICD-10-CM

## 2019-05-07 DIAGNOSIS — Z13.9 VISIT FOR SCREENING: ICD-10-CM

## 2019-05-07 DIAGNOSIS — M25.569 KNEE PAIN: ICD-10-CM

## 2019-05-07 PROCEDURE — 73562 X-RAY EXAM OF KNEE 3: CPT

## 2019-05-07 PROCEDURE — 72100 X-RAY EXAM L-S SPINE 2/3 VWS: CPT

## 2019-05-07 PROCEDURE — 77067 SCR MAMMO BI INCL CAD: CPT

## 2019-11-15 ENCOUNTER — ANESTHESIA EVENT (OUTPATIENT)
Dept: SURGERY | Age: 54
End: 2019-11-15
Payer: MEDICAID

## 2019-11-19 ENCOUNTER — ANESTHESIA (OUTPATIENT)
Dept: SURGERY | Age: 54
End: 2019-11-19
Payer: MEDICAID

## 2019-11-19 ENCOUNTER — HOSPITAL ENCOUNTER (OUTPATIENT)
Age: 54
Setting detail: OUTPATIENT SURGERY
Discharge: HOME | End: 2019-11-19
Attending: INTERNAL MEDICINE | Admitting: INTERNAL MEDICINE
Payer: MEDICAID

## 2019-11-19 VITALS
HEART RATE: 46 BPM | DIASTOLIC BLOOD PRESSURE: 85 MMHG | BODY MASS INDEX: 33.66 KG/M2 | HEIGHT: 65 IN | OXYGEN SATURATION: 100 % | RESPIRATION RATE: 19 BRPM | TEMPERATURE: 97.6 F | WEIGHT: 202 LBS | SYSTOLIC BLOOD PRESSURE: 139 MMHG

## 2019-11-19 PROCEDURE — 77030013992 HC SNR POLYP ENDOSC BSC -B: Performed by: INTERNAL MEDICINE

## 2019-11-19 PROCEDURE — 76060000032 HC ANESTHESIA 0.5 TO 1 HR: Performed by: INTERNAL MEDICINE

## 2019-11-19 PROCEDURE — 76210000020 HC REC RM PH II FIRST 0.5 HR: Performed by: INTERNAL MEDICINE

## 2019-11-19 PROCEDURE — 76010000138 HC OR TIME 0.5 TO 1 HR: Performed by: INTERNAL MEDICINE

## 2019-11-19 PROCEDURE — 74011000250 HC RX REV CODE- 250: Performed by: ANESTHESIOLOGY

## 2019-11-19 PROCEDURE — 77030003657 HC NDL SCLER BSC -B: Performed by: INTERNAL MEDICINE

## 2019-11-19 PROCEDURE — 88305 TISSUE EXAM BY PATHOLOGIST: CPT

## 2019-11-19 PROCEDURE — 74011250636 HC RX REV CODE- 250/636: Performed by: ANESTHESIOLOGY

## 2019-11-19 PROCEDURE — 76210000063 HC OR PH I REC FIRST 0.5 HR: Performed by: INTERNAL MEDICINE

## 2019-11-19 RX ORDER — ATROPINE SULFATE 1 MG/ML
0.5 INJECTION, SOLUTION INTRAVENOUS
Status: DISCONTINUED | OUTPATIENT
Start: 2019-11-19 | End: 2019-11-19 | Stop reason: HOSPADM

## 2019-11-19 RX ORDER — LIDOCAINE HYDROCHLORIDE 20 MG/ML
5 SOLUTION OROPHARYNGEAL AS NEEDED
Status: DISCONTINUED | OUTPATIENT
Start: 2019-11-19 | End: 2019-11-19 | Stop reason: HOSPADM

## 2019-11-19 RX ORDER — HYDROMORPHONE HYDROCHLORIDE 1 MG/ML
0.5 INJECTION, SOLUTION INTRAMUSCULAR; INTRAVENOUS; SUBCUTANEOUS
Status: DISCONTINUED | OUTPATIENT
Start: 2019-11-19 | End: 2019-11-19 | Stop reason: HOSPADM

## 2019-11-19 RX ORDER — DIPHENHYDRAMINE HYDROCHLORIDE 50 MG/ML
50 INJECTION, SOLUTION INTRAMUSCULAR; INTRAVENOUS ONCE
Status: DISCONTINUED | OUTPATIENT
Start: 2019-11-19 | End: 2019-11-19 | Stop reason: HOSPADM

## 2019-11-19 RX ORDER — FENTANYL CITRATE 50 UG/ML
100 INJECTION, SOLUTION INTRAMUSCULAR; INTRAVENOUS ONCE
Status: DISCONTINUED | OUTPATIENT
Start: 2019-11-19 | End: 2019-11-19 | Stop reason: HOSPADM

## 2019-11-19 RX ORDER — DEXTROMETHORPHAN/PSEUDOEPHED 2.5-7.5/.8
1.2 DROPS ORAL
Status: DISCONTINUED | OUTPATIENT
Start: 2019-11-19 | End: 2019-11-19 | Stop reason: HOSPADM

## 2019-11-19 RX ORDER — LIDOCAINE HYDROCHLORIDE 10 MG/ML
0.1 INJECTION, SOLUTION EPIDURAL; INFILTRATION; INTRACAUDAL; PERINEURAL AS NEEDED
Status: DISCONTINUED | OUTPATIENT
Start: 2019-11-19 | End: 2019-11-19 | Stop reason: HOSPADM

## 2019-11-19 RX ORDER — SODIUM CHLORIDE 0.9 % (FLUSH) 0.9 %
5-40 SYRINGE (ML) INJECTION AS NEEDED
Status: DISCONTINUED | OUTPATIENT
Start: 2019-11-19 | End: 2019-11-19 | Stop reason: HOSPADM

## 2019-11-19 RX ORDER — FLUMAZENIL 0.1 MG/ML
0.2 INJECTION INTRAVENOUS
Status: DISCONTINUED | OUTPATIENT
Start: 2019-11-19 | End: 2019-11-19 | Stop reason: HOSPADM

## 2019-11-19 RX ORDER — DEXTROSE MONOHYDRATE AND SODIUM CHLORIDE 5; .9 G/100ML; G/100ML
100 INJECTION, SOLUTION INTRAVENOUS CONTINUOUS
Status: DISCONTINUED | OUTPATIENT
Start: 2019-11-19 | End: 2019-11-19 | Stop reason: HOSPADM

## 2019-11-19 RX ORDER — LORAZEPAM 2 MG/ML
2 INJECTION INTRAMUSCULAR AS NEEDED
Status: DISCONTINUED | OUTPATIENT
Start: 2019-11-19 | End: 2019-11-19 | Stop reason: HOSPADM

## 2019-11-19 RX ORDER — SODIUM CHLORIDE, SODIUM LACTATE, POTASSIUM CHLORIDE, CALCIUM CHLORIDE 600; 310; 30; 20 MG/100ML; MG/100ML; MG/100ML; MG/100ML
50 INJECTION, SOLUTION INTRAVENOUS CONTINUOUS
Status: DISCONTINUED | OUTPATIENT
Start: 2019-11-19 | End: 2019-11-19 | Stop reason: HOSPADM

## 2019-11-19 RX ORDER — SODIUM CHLORIDE 0.9 % (FLUSH) 0.9 %
5-40 SYRINGE (ML) INJECTION EVERY 8 HOURS
Status: DISCONTINUED | OUTPATIENT
Start: 2019-11-19 | End: 2019-11-19 | Stop reason: HOSPADM

## 2019-11-19 RX ORDER — MIDAZOLAM HYDROCHLORIDE 1 MG/ML
5 INJECTION, SOLUTION INTRAMUSCULAR; INTRAVENOUS
Status: DISCONTINUED | OUTPATIENT
Start: 2019-11-19 | End: 2019-11-19 | Stop reason: HOSPADM

## 2019-11-19 RX ORDER — LIDOCAINE HYDROCHLORIDE 20 MG/ML
INJECTION, SOLUTION INFILTRATION; PERINEURAL AS NEEDED
Status: DISCONTINUED | OUTPATIENT
Start: 2019-11-19 | End: 2019-11-19 | Stop reason: HOSPADM

## 2019-11-19 RX ORDER — PROPOFOL 10 MG/ML
INJECTION, EMULSION INTRAVENOUS AS NEEDED
Status: DISCONTINUED | OUTPATIENT
Start: 2019-11-19 | End: 2019-11-19 | Stop reason: HOSPADM

## 2019-11-19 RX ORDER — EPINEPHRINE 0.1 MG/ML
1 INJECTION INTRACARDIAC; INTRAVENOUS
Status: DISCONTINUED | OUTPATIENT
Start: 2019-11-19 | End: 2019-11-19 | Stop reason: HOSPADM

## 2019-11-19 RX ORDER — NALOXONE HYDROCHLORIDE 0.4 MG/ML
0.4 INJECTION, SOLUTION INTRAMUSCULAR; INTRAVENOUS; SUBCUTANEOUS
Status: DISCONTINUED | OUTPATIENT
Start: 2019-11-19 | End: 2019-11-19 | Stop reason: HOSPADM

## 2019-11-19 RX ORDER — FENTANYL CITRATE 50 UG/ML
25 INJECTION, SOLUTION INTRAMUSCULAR; INTRAVENOUS
Status: DISCONTINUED | OUTPATIENT
Start: 2019-11-19 | End: 2019-11-19 | Stop reason: HOSPADM

## 2019-11-19 RX ORDER — SODIUM CHLORIDE 9 MG/ML
100 INJECTION, SOLUTION INTRAVENOUS CONTINUOUS
Status: DISCONTINUED | OUTPATIENT
Start: 2019-11-19 | End: 2019-11-19 | Stop reason: HOSPADM

## 2019-11-19 RX ORDER — SODIUM CHLORIDE 9 MG/ML
50 INJECTION, SOLUTION INTRAVENOUS CONTINUOUS
Status: DISCONTINUED | OUTPATIENT
Start: 2019-11-19 | End: 2019-11-19 | Stop reason: HOSPADM

## 2019-11-19 RX ADMIN — PROPOFOL 10 MG: 10 INJECTION, EMULSION INTRAVENOUS at 12:10

## 2019-11-19 RX ADMIN — PROPOFOL 10 MG: 10 INJECTION, EMULSION INTRAVENOUS at 12:14

## 2019-11-19 RX ADMIN — PROPOFOL 10 MG: 10 INJECTION, EMULSION INTRAVENOUS at 11:58

## 2019-11-19 RX ADMIN — PROPOFOL 10 MG: 10 INJECTION, EMULSION INTRAVENOUS at 12:13

## 2019-11-19 RX ADMIN — PROPOFOL 10 MG: 10 INJECTION, EMULSION INTRAVENOUS at 12:03

## 2019-11-19 RX ADMIN — PROPOFOL 10 MG: 10 INJECTION, EMULSION INTRAVENOUS at 12:04

## 2019-11-19 RX ADMIN — PROPOFOL 10 MG: 10 INJECTION, EMULSION INTRAVENOUS at 12:16

## 2019-11-19 RX ADMIN — PROPOFOL 10 MG: 10 INJECTION, EMULSION INTRAVENOUS at 12:05

## 2019-11-19 RX ADMIN — PROPOFOL 10 MG: 10 INJECTION, EMULSION INTRAVENOUS at 12:15

## 2019-11-19 RX ADMIN — PROPOFOL 10 MG: 10 INJECTION, EMULSION INTRAVENOUS at 12:00

## 2019-11-19 RX ADMIN — LIDOCAINE HYDROCHLORIDE 60 MG: 20 INJECTION, SOLUTION INFILTRATION; PERINEURAL at 11:53

## 2019-11-19 RX ADMIN — SODIUM CHLORIDE, POTASSIUM CHLORIDE, SODIUM LACTATE AND CALCIUM CHLORIDE: 600; 310; 30; 20 INJECTION, SOLUTION INTRAVENOUS at 11:28

## 2019-11-19 RX ADMIN — PROPOFOL 10 MG: 10 INJECTION, EMULSION INTRAVENOUS at 12:09

## 2019-11-19 RX ADMIN — PROPOFOL 10 MG: 10 INJECTION, EMULSION INTRAVENOUS at 12:06

## 2019-11-19 RX ADMIN — PROPOFOL 10 MG: 10 INJECTION, EMULSION INTRAVENOUS at 11:57

## 2019-11-19 RX ADMIN — PROPOFOL 10 MG: 10 INJECTION, EMULSION INTRAVENOUS at 12:08

## 2019-11-19 RX ADMIN — PROPOFOL 10 MG: 10 INJECTION, EMULSION INTRAVENOUS at 11:55

## 2019-11-19 RX ADMIN — PROPOFOL 10 MG: 10 INJECTION, EMULSION INTRAVENOUS at 12:01

## 2019-11-19 RX ADMIN — PROPOFOL 10 MG: 10 INJECTION, EMULSION INTRAVENOUS at 12:12

## 2019-11-19 RX ADMIN — PROPOFOL 80 MG: 10 INJECTION, EMULSION INTRAVENOUS at 11:53

## 2019-11-19 RX ADMIN — PROPOFOL 10 MG: 10 INJECTION, EMULSION INTRAVENOUS at 11:54

## 2019-11-19 RX ADMIN — PROPOFOL 10 MG: 10 INJECTION, EMULSION INTRAVENOUS at 12:07

## 2019-11-19 RX ADMIN — PROPOFOL 10 MG: 10 INJECTION, EMULSION INTRAVENOUS at 11:59

## 2019-11-19 RX ADMIN — PROPOFOL 10 MG: 10 INJECTION, EMULSION INTRAVENOUS at 12:02

## 2019-11-19 RX ADMIN — PROPOFOL 10 MG: 10 INJECTION, EMULSION INTRAVENOUS at 12:11

## 2019-11-19 RX ADMIN — PROPOFOL 10 MG: 10 INJECTION, EMULSION INTRAVENOUS at 12:17

## 2019-11-19 RX ADMIN — PROPOFOL 10 MG: 10 INJECTION, EMULSION INTRAVENOUS at 11:56

## 2019-11-19 NOTE — ANESTHESIA PREPROCEDURE EVALUATION
Relevant Problems   No relevant active problems       Anesthetic History   No history of anesthetic complications            Review of Systems / Medical History  Patient summary reviewed and pertinent labs reviewed    Pulmonary    COPD      Smoker         Neuro/Psych         Psychiatric history     Cardiovascular                  Exercise tolerance: <4 METS     GI/Hepatic/Renal  Within defined limits              Endo/Other        Obesity and arthritis     Other Findings              Physical Exam    Airway  Mallampati: II  TM Distance: 4 - 6 cm  Neck ROM: normal range of motion   Mouth opening: Normal     Cardiovascular    Rhythm: regular  Rate: normal         Dental    Dentition: Poor dentition     Pulmonary  Breath sounds clear to auscultation               Abdominal  GI exam deferred       Other Findings            Anesthetic Plan    ASA: 3  Anesthesia type: MAC            Anesthetic plan and risks discussed with: Patient

## 2019-11-19 NOTE — H&P
G I Procedure Note           Endoscopy History and Physical               Dr. Jose Lema Office     [place] [address] [id number]  [id number]    1965  53 y.o.  female      Date of Procedure:   Preoperative Diagnosis:       Procedure:    11/19/2019           COLON SCREENING                              Procedure(s):  COLONOSCOPY      Gastroenterologist:  Anesthesia:           MD CB Perdomo            History and procedure indication:  Marce Erwin is a 53 y.o. BLACK OR  female who presents with: COLON SCREENING   including the additional history of Screening ,Screening for colon cancer,,        Past Medical History:   Diagnosis Date    Acid reflux     Arthritis     KNEE, BACK    COPD     Depression     Other ill-defined conditions(799.89)     Chronic Back Pain    Psychiatric disorder     bipolar      Prior to Admission medications    Medication Sig Start Date End Date Taking? Authorizing Provider   ibuprofen (MOTRIN) 600 mg tablet Take 1 Tab by mouth every six (6) hours as needed for Pain. 8/31/18   Wilma Del Angel NP   gabapentin (NEURONTIN) 300 mg capsule Take 300 mg by mouth three (3) times daily. Provider, Historical   methocarbamol (ROBAXIN) 750 mg tablet Take 750 mg by mouth nightly. Provider, Historical   potassium chloride SR (KLOR-CON 10) 10 mEq tablet Take 10 mEq by mouth daily. Provider, Historical   doxepin (SINEQUAN) 10 mg capsule Take 10 mg by mouth nightly. Provider, Historical   TRAZODONE HCL (TRAZODONE PO) Take  by mouth. Other, MD Lauryn   pantoprazole (PROTONIX) 40 mg tablet Take 40 mg by mouth as needed. Other, MD Lauryn   Cholecalciferol, Vitamin D3, 50,000 unit cap Take  by mouth. Lauryn Floyd MD   TIOTROPIUM BROMIDE ([address] [place] [address]) Take  by inhalation. Provider, Historical   sertraline (ZOLOFT) 100 mg tablet Take 100 mg by mouth two (2) times a day.     Other, MD Lauryn     Allergies   Allergen Reactions    Latex Rash    Sulfa (Sulfonamide Antibiotics) Rash       Past Surgical History:   Procedure Laterality Date    ABDOMEN SURGERY PROC UNLISTED      Cholecystectomy    HX CHOLECYSTECTOMY      HX GYN      oophorectomy, left    HX ORTHOPAEDIC Left CHILD    Fracture repair/knee    HX ORTHOPAEDIC  TEEN    REPAIR FX RIGHT FOREARM     Family History   Problem Relation Age of Onset    Hypertension Mother    [facility] Diabetes Mother    [facility] Anesth Problems Neg Hx       Social History     Tobacco Use    Smoking status: Current Every Day Smoker     Packs/day: 0.50     Years: 46.00     Pack years: 23.00     Types: Cigarettes    Smokeless tobacco: Never Used   Substance Use Topics    Alcohol use: Yes     Comment: 2 DRINKS PER MONTH                                                    PHYSICAL EXAM     Visit Vitals  LMP 08/31/2011       General appearance:  alert, well appearing, and in no distress  Mental status:  normal mood, behavior, speech, dress, motor activity and thought processes  Nose:      normal and patent, no erythema, discharge or polyps  Mouth:- mucous membranes moist, pharynx normal without lesions                  [x]  No Loose teeth      []    Loose teeth  Finger opening:  []1     []1.5    [] 2     [] 2.5     [x] 3      [] 3.5     [] 4   Mallampati:         [] Class 1     [x] Class 2    [] Class 3      [] Class 4      Neck - supple,      [x] Full ROM [] Decreased ROM  [] Short Neck no significant adenopathy    Chest - clear to auscultation, no wheezes, rales or rhonchi, symmetric air entry  Heart: normal rate, regular rhythm, normal S1, S2, no murmurs, rubs, clicks or gallops  Abdomen: abdomen soft, bowel sounds  [x] normal  [] increased  [] hypoactive                       [] no tenderness  [] epigastric tenderness  [] LLQ tenderness   [] RLQ tenderness No masses, organomegaly or guarding. Rectal exam: negative without mass, lesions or tenderness  Extremities: peripheral pulses normal, no pedal edema, no clubbing or cyanosis  Neurologic: Alert and oriented to person, place, and time; normal strength and tone. Normal symmetric reflexes  Normal gait:                                      Assessement:                                 Pre op dx:  COLON SCREENING   Additional medical problems list below   Patient Active Problem List   Diagnosis Code    Malingering Z76.5    Polysubstance dependence ([facility] [address] F19.20    Antisocial personality disorder ([facility] [address] F60.2    Chronic low back pain M54.5, G89.29    Lumbar facet arthropathy G02.570    Uncomplicated opioid use T50.03                                                                                           This note documentation was performed prior to this planned procedure       after a history and physical was performed in the office.          Date: 6 1 19 Immediate update no changes in H&P                          Pre Procedure Evaluation (per anesthesia or per h&p)                                                Sedation/Assessment:                                                                                               Mallampati Classification                            []Class 1                    []Class 2                    [] Class 3                  [] Class 4                                              ASA classfication         []     Class I: Normally healthy         []     Class II: Patient with mild systemic disease (e.g. hypertension)         []     Class III: Patient with severe systemic disease (e.g. CHF), non-decompensated         []     Class IV: Patient with severe systemic disease, decompensated         []     Class V: Moribund patient, survival unlikely                     Plan:  []  Egd                                 [x] Colonoscopy [] with Moderate Sedation /Conscious Sedation                                 [x] MAC          Patient stable for planned procedure. See orders.      Klarissa Velazquez MD

## 2019-11-19 NOTE — PERIOP NOTES
PT  A&OX4 - BURNETT TO COMMAND. HAO SIPS OF PEPSI.  + FLATUS. ABD REMAINS SOFT AND NON-TENDER. DENIES DISCOMFORT.  0/10 ON PAIN SCALE.

## 2019-11-19 NOTE — DISCHARGE INSTRUCTIONS
Endoscopy Discharge Instructions     Dr. Carey Rota office                                            NAME: Giuliana Paul RECORD WKYOM    AGE:  53 y.o. YOB: 1965                                                              FINAL Discharge Procedure and Diagnosis:       Procedure(s):  COLONOSCOPY       FINDINGS:     Polyps removed                                        MEDICATIONS    [x] CONTINUE CURRENT MEDICATIONS     [] NEW MEDICATIONS           1.    2.    3.         Testing   Schedule             Colonoscopy Screening                                   Recommendations       []  Repeat colonoscopy in 6-12 month 2nd        to Inadequate  prep    [x]  Repeat colonoscopy in 3 years    []  Repeat colonoscopy in 5 years    []  Repeat colonoscopy in 10 years         New additional  Tests  Call the office   [phone] for the appointment time      []      []      []                                     YOUR NEXT APPOINTMENT WITH DR Vivian Lowe:                                                                                                                               [x]   None follow up with pcp   []  1 week       []   2 week    []  1 month    Always keep Mark Patel MD for regular medical follow up                                                                                                                         If you had a colonoscopy the \"C\" indicates specific instructions        x                                           Diet Instructions :   Ordinarily you may resume your previous diet but your initial diet should be       Light your discharge nurse will go over this with you. Large meals can cause  abdominal discomfort after these procedures.                                                                           Specific Diet Recommendations:        [x] High fiber diet. https://www.Meteor. com/diets/        [] GERD diet: avoid fried and fatty foods, peppermint, chocolate, alcohol,               coffee, citrus fruits and juices, and tomato products. Avoid lying down for            2 to 3  hours after eating. https://www.monahan.com/. USEUM/diets/            []  FODMAP DIET  DeathUnit.nl              []  All diets eg high fiber, gastroparesis. , weight loss , gluten free             1. [employer]              2.  https://www.FanMiles/. USEUM/diets/           __x__  Brittneeanjelica Zhang may feel quite tired and need to rest and recuperate for several hours    following these procedures. __x__  Due to the fact that sedation was administered for this procedure, do not drive,   operate machinery or sign legal documents for the next 24 hours. __x__  Mild abdominal pain may be experienced after your procedure, but is should   disappear after several hours. Notify your physician if you have persistent pain,   tenderness or abdominal distension. __x__  C    Many patients for the first few hours following the exam may experience         belching or passing gas through the rectum. Walking may help to relieve        distention and gas pains. A warm bath or shower will often help with abdominal  cramping.                                                                                            __x__   Brittnee Vicente may return to your normal routine tomorrow, according to how you feel        and depending on your doctors instructions. Be sure to call your doctor to make  an appointment for a post-surgery check-up on the date your doctor has   requested. __x__ C     Rectal bleeding or spotting in small amounts may occur with the first bowel   movement following a colonoscopy or sigmoidoscopy.  If a large amount of blood is noted call immediately     __x__  You may experience a numbness or lack of sensation in throat. If present, do not     eat or drink. Before eating, test your ability to drink with small sips of water. Y     You may try clear liquids or soups. If you tolerate these, you may then eat solid     food which is not greasy or spicy. __x__ C     IF POLYPS REMOVED: Avoid any blood thinning medication such as plavix,   aspirin or coumadin  NSAIDS (like advil or alleve) for 7 days. __x__  Notify your physician if you cough or vomit blood or experience chest pain. Your biopsy or testing result should be available in 7-10 days                                                                                                                      Prescription will be electronically sent to your pharmacy you must     let your nurse know your pharmacy:                                                                                                                                          [address] [place] [address]. TO HELP ENSURE A SMOOTH RECOVERY,       IT IS IMPORTANT TO FOLLOW THEM. _x___Pamphlet /Educational Information provided for diagnostic findings     Additional education information can assessed at the sites below:   [employer]   http://www.digestive. niddk.nih.gov/ddiseases/a-z.asp      Web MD patient information                                                                                                Signature of individual given instructions :   Date: 11/19/2019                                                                                                                                  DISCHARGE SUMMARY from Nurse    PATIENT INSTRUCTIONS:    After general anesthesia or intravenous sedation, for 24 hours or while taking prescription Narcotics:  · Limit your activities  · Do not drive and operate hazardous machinery  · Do not make important personal or business decisions  · Do  not drink alcoholic beverages  · If you have not urinated within 8 hours after discharge, please contact your surgeon on call. Report the following to your surgeon:  · Excessive pain, swelling, redness or odor of or around the surgical area  · Temperature over 100.5  · Nausea and vomiting lasting longer than 4 hours or if unable to take medications  · Any signs of decreased circulation or nerve impairment to extremity: change in color, persistent  numbness, tingling, coldness or increase pain  · Any questions    *  Please carry medication information at all times in case of emergency situations. These are general instructions for a healthy lifestyle:    No smoking/ No tobacco products/ Avoid exposure to second hand smoke  Surgeon General's Warning:  Quitting smoking now greatly reduces serious risk to your health. Obesity, smoking, and sedentary lifestyle greatly increases your risk for illness    A healthy diet, regular physical exercise & weight monitoring are important for maintaining a healthy lifestyle    You may be retaining fluid if you have a history of heart failure or if you experience any of the following symptoms:  Weight gain of 3 pounds or more overnight or 5 pounds in a week, increased swelling in our hands or feet or shortness of breath while lying flat in bed. Please call your doctor as soon as you notice any of these symptoms; do not wait until your next office visit. The discharge information has been reviewed with the {PATIENT PARENT GUARDIAN:61005}. The {PATIENT PARENT GUARDIAN:01393} verbalized understanding.   Discharge medications reviewed with the {Dishcarge meds reviewed TGQW:40141} and appropriate educational materials and side effects teaching were provided.   ___________________________________________________________________________________________________________________________________

## 2019-11-19 NOTE — PROCEDURES
G I Procedure Note            COLONOSCOPY   Dr. Umana Maple office   [facility]  [address]                                   [id number]                                  [id number]   1965                                      54 y. o.                                    female      Procedure Date: 11/19/2019   [x]  Anesthesia MAC                                                                                                Pre Op Diagnosis:    Indications:                   1. COLON SCREENING                                                                                                                                                                          Post Op Diagnosis:                   1.   COLON POLYPS   removed                                                                                   H&p completed: Yes            Anesthesia Assessment: Performed prior to procedure:      No change  Anesthesia Plan: Performed prior to procedure:                   No change       Medications: See Reviewed List and Reconcilation           Informed consent was obtained     Risk Statement:  Prior to the procedure the risks were explained to the patient and/or to the family including but not limited to perforation, bleeding, adverse drug reaction, aspiration, and even the need for possible surgery. A colonoscopy exam is not 100% accurate which may be related to preparation or blind spots during the exam.The possibility that an abnormality and /or cancer could be missed was also discussed as well as alternative x-ray options.          Instrument:    Olympus adult Videocolonoscope                                   Immediate Procedure Reassessment Completed     With the patient in the left lateral position, a rectal examination was performed and the findings were: negative without mass, lesions or tenderness   The Olympus Video colonoscope was inserted under direct vision into the rectum. The colonoscope was passed from the rectum to the cecum, which was identified by the ileocecal valve. The colon findings demonstrated:  ANUS: Anal exam reveals no masses or external hemorrhoids, sphincter tone is normal.   RECTUM: Rectal exam reveals no masses  . SIGMOID COLON: The sigmoid was unremarkable except as noted below   Findings below   DESCENDING COLON:  The videoscolonoscope was advanced carefully. Findings below  SPLENIC FLEXURE: The splenic flexure is normal.   TRANSVERSE COLON:  The typical triangular pattern was noted. Findings below      HEPATIC FLEXURE: The hepatic flexure is normal.   ASCENDING COLON:  No  bleeding Findings below     CECUM:  The ileocecal valve appears normal.   TERMINAL ILEUM: The terminal ileum was not entered. Finding noted      [] mucosa normal      [] Diverticulosis     [] avm     [] Additional findings:          A polyp was identified. 8 mm in size x2 , located in the ascending colon removed by snare cautery and retrieved for pathology 10 mm in size, located in the sigmoid removed by snare cautery and retrieved for pathology The polypectomy site was reviewed and was free of hemorrhage. The colonoscope was slowly withdrawn >6 minute period and the instrument was retroflexed in the rectum. The rectal findings were:Normal  The patient tolerated the entire procedure well. Blood Loss nil  No complications  Anesthesia  MAC  No crystalloids  No Implants  Assistants : per nursing documentation team members     For biopsy  Specimen verification by physician and nurse two sources, name,           social security numbers     Colon preparation was good    Recommendations:     - Await pathology. - Repeat colonoscopy in 3 years.       Copies sent to   Rolando Simeon MD  CC:  Albert Rojas MD

## 2019-11-19 NOTE — ANESTHESIA POSTPROCEDURE EVALUATION
Procedure(s):  COLONOSCOPY  POLYPECTOMY  INJECTION.     MAC    Anesthesia Post Evaluation      Multimodal analgesia: multimodal analgesia not used between 6 hours prior to anesthesia start to PACU discharge  Patient location during evaluation: ICU  Patient participation: complete - patient participated  Level of consciousness: awake and alert  Pain management: adequate  Airway patency: patent  Anesthetic complications: no  Cardiovascular status: acceptable  Respiratory status: acceptable  Hydration status: acceptable  Post anesthesia nausea and vomiting:  none      Vitals Value Taken Time   /72 11/19/2019 12:50 PM   Temp 36.4 °C (97.6 °F) 11/19/2019 12:40 PM   Pulse 52 11/19/2019 12:50 PM   Resp 14 11/19/2019 12:50 PM   SpO2 99 % 11/19/2019 12:50 PM

## 2019-11-19 NOTE — PERIOP NOTES
PT A&OX4 - BURNETT TO COMMAND. RESP EVEN AND UNLABORED. POX ON RA > 94%. ABD SOFT, NON-DISTENDED AND NON-TENDER.  + FLATUS. pT AMB TO BR WITHOUT ASSIST. STEADY ON FEET.  1305 - PT'S FRIEND IN. DISCHARGE INSTRUCTIONS REVIEWED [facility] PT AND PT'S FRIEND. BOTH VERBALIZE UNDERSTANDING. 1315 - PT DISCHARGED TO HOME IN CARE OF FRIEND. PT STATES 0/10 ON PAIN SCALE AT TIME OF DISCHARGE.

## 2019-11-19 NOTE — PERIOP NOTES
Handoff Report from Operating Room to PACU    Report received from [facility] and HARMAN Nguyen MD regarding Maris Villarreal. Surgeon(s):  Eugene Arnold MD  And Procedure(s) (LRB):  COLONOSCOPY (N/A)  POLYPECTOMY (N/A)  INJECTION (N/A)  confirmed   with allergies discussed. Anesthesia type, drugs, patient history, complications, estimated blood loss, vital signs, intake and output, and last pain medication, lines, reversal medications and temperature were reviewed. PT ADM TO PACU BAY 2- ALERT - ORIENTED TO PACU SETTING. RESP EVEN AND UNLABORED. POXON RA > 94%. RFA IV SITE BENIGN. PT DENIES DISCOMFORT 0/10 ON PAIN SCALE. SR UP X2.

## 2019-11-20 NOTE — PERIOP NOTES
Pt reports eating ribs last night and now buttocks hurts and pt is having minimal bleeding; informed to eat lightly today

## 2022-12-17 ENCOUNTER — HOSPITAL ENCOUNTER (EMERGENCY)
Age: 57
Discharge: HOME | End: 2022-12-17
Attending: EMERGENCY MEDICINE
Payer: MEDICAID

## 2022-12-17 ENCOUNTER — APPOINTMENT (OUTPATIENT)
Dept: CT IMAGING | Age: 57
End: 2022-12-17
Attending: EMERGENCY MEDICINE
Payer: MEDICAID

## 2022-12-17 VITALS
WEIGHT: 190 LBS | DIASTOLIC BLOOD PRESSURE: 99 MMHG | SYSTOLIC BLOOD PRESSURE: 135 MMHG | BODY MASS INDEX: 31.65 KG/M2 | HEART RATE: 64 BPM | OXYGEN SATURATION: 97 % | RESPIRATION RATE: 18 BRPM | HEIGHT: 65 IN | TEMPERATURE: 98.5 F

## 2022-12-17 DIAGNOSIS — M54.16 LUMBAR RADICULOPATHY: Primary | ICD-10-CM

## 2022-12-17 LAB
ANION GAP SERPL CALC-SCNC: 7 MMOL/L (ref 5–15)
BASOPHILS # BLD: 0 K/UL (ref 0–0.1)
BASOPHILS NFR BLD: 1 % (ref 0–1)
BUN SERPL-MCNC: 21 MG/DL (ref 6–20)
BUN/CREAT SERPL: 26 (ref 12–20)
CALCIUM SERPL-MCNC: 8.5 MG/DL (ref 8.5–10.1)
CHLORIDE SERPL-SCNC: 109 MMOL/L (ref 97–108)
CO2 SERPL-SCNC: 29 MMOL/L (ref 21–32)
CREAT SERPL-MCNC: 0.81 MG/DL (ref 0.55–1.02)
DIFFERENTIAL METHOD BLD: ABNORMAL
EOSINOPHIL # BLD: 0.8 K/UL (ref 0–0.4)
EOSINOPHIL NFR BLD: 9 % (ref 0–7)
ERYTHROCYTE [DISTWIDTH] IN BLOOD BY AUTOMATED COUNT: 12.5 % (ref 11.5–14.5)
GLUCOSE SERPL-MCNC: 117 MG/DL (ref 65–100)
HCT VFR BLD AUTO: 38.9 % (ref 35–47)
HGB BLD-MCNC: 13 G/DL (ref 11.5–16)
IMM GRANULOCYTES # BLD AUTO: 0 K/UL (ref 0–0.04)
IMM GRANULOCYTES NFR BLD AUTO: 0 % (ref 0–0.5)
LYMPHOCYTES # BLD: 3.6 K/UL (ref 0.8–3.5)
LYMPHOCYTES NFR BLD: 41 % (ref 12–49)
MCH RBC QN AUTO: 31.4 PG (ref 26–34)
MCHC RBC AUTO-ENTMCNC: 33.4 G/DL (ref 30–36.5)
MCV RBC AUTO: 94 FL (ref 80–99)
MONOCYTES # BLD: 0.7 K/UL (ref 0–1)
MONOCYTES NFR BLD: 8 % (ref 5–13)
NEUTS SEG # BLD: 3.6 K/UL (ref 1.8–8)
NEUTS SEG NFR BLD: 41 % (ref 32–75)
NRBC # BLD: 0 K/UL (ref 0–0.01)
NRBC BLD-RTO: 0 PER 100 WBC
PLATELET # BLD AUTO: 206 K/UL (ref 150–400)
PMV BLD AUTO: 10.5 FL (ref 8.9–12.9)
POTASSIUM SERPL-SCNC: 3.3 MMOL/L (ref 3.5–5.1)
RBC # BLD AUTO: 4.14 M/UL (ref 3.8–5.2)
SODIUM SERPL-SCNC: 145 MMOL/L (ref 136–145)
WBC # BLD AUTO: 8.7 K/UL (ref 3.6–11)

## 2022-12-17 PROCEDURE — 74011250636 HC RX REV CODE- 250/636: Performed by: EMERGENCY MEDICINE

## 2022-12-17 PROCEDURE — 80048 BASIC METABOLIC PNL TOTAL CA: CPT

## 2022-12-17 PROCEDURE — 96375 TX/PRO/DX INJ NEW DRUG ADDON: CPT

## 2022-12-17 PROCEDURE — 36415 COLL VENOUS BLD VENIPUNCTURE: CPT

## 2022-12-17 PROCEDURE — 85025 COMPLETE CBC W/AUTO DIFF WBC: CPT

## 2022-12-17 PROCEDURE — 74011250637 HC RX REV CODE- 250/637: Performed by: EMERGENCY MEDICINE

## 2022-12-17 PROCEDURE — 99284 EMERGENCY DEPT VISIT MOD MDM: CPT

## 2022-12-17 PROCEDURE — 96374 THER/PROPH/DIAG INJ IV PUSH: CPT

## 2022-12-17 PROCEDURE — 72131 CT LUMBAR SPINE W/O DYE: CPT

## 2022-12-17 PROCEDURE — 74011000250 HC RX REV CODE- 250: Performed by: EMERGENCY MEDICINE

## 2022-12-17 RX ORDER — POTASSIUM CHLORIDE 750 MG/1
40 TABLET, FILM COATED, EXTENDED RELEASE ORAL
Status: COMPLETED | OUTPATIENT
Start: 2022-12-17 | End: 2022-12-17

## 2022-12-17 RX ORDER — DEXAMETHASONE SODIUM PHOSPHATE 10 MG/ML
10 INJECTION INTRAMUSCULAR; INTRAVENOUS
Status: COMPLETED | OUTPATIENT
Start: 2022-12-17 | End: 2022-12-17

## 2022-12-17 RX ORDER — KETOROLAC TROMETHAMINE 30 MG/ML
15 INJECTION, SOLUTION INTRAMUSCULAR; INTRAVENOUS
Status: COMPLETED | OUTPATIENT
Start: 2022-12-17 | End: 2022-12-17

## 2022-12-17 RX ORDER — SODIUM CHLORIDE 0.9 % (FLUSH) 0.9 %
5-40 SYRINGE (ML) INJECTION EVERY 8 HOURS
Status: DISCONTINUED | OUTPATIENT
Start: 2022-12-17 | End: 2022-12-17 | Stop reason: HOSPADM

## 2022-12-17 RX ORDER — IBUPROFEN 800 MG/1
800 TABLET ORAL
Qty: 20 TABLET | Refills: 0 | Status: SHIPPED | OUTPATIENT
Start: 2022-12-17 | End: 2022-12-24

## 2022-12-17 RX ORDER — PREDNISONE 20 MG/1
60 TABLET ORAL DAILY
Qty: 15 TABLET | Refills: 0 | Status: SHIPPED | OUTPATIENT
Start: 2022-12-17 | End: 2022-12-22

## 2022-12-17 RX ADMIN — KETOROLAC TROMETHAMINE 15 MG: 30 INJECTION, SOLUTION INTRAMUSCULAR at 15:35

## 2022-12-17 RX ADMIN — SODIUM CHLORIDE 1000 ML: 9 INJECTION, SOLUTION INTRAVENOUS at 15:35

## 2022-12-17 RX ADMIN — POTASSIUM CHLORIDE 40 MEQ: 750 TABLET, EXTENDED RELEASE ORAL at 16:46

## 2022-12-17 RX ADMIN — SODIUM CHLORIDE, PRESERVATIVE FREE 10 ML: 5 INJECTION INTRAVENOUS at 15:59

## 2022-12-17 RX ADMIN — DEXAMETHASONE SODIUM PHOSPHATE 10 MG: 10 INJECTION INTRAMUSCULAR; INTRAVENOUS at 15:58

## 2022-12-17 NOTE — ED NOTES
Pt presents to ED via wheelcahair complaining of atraumatic lower back pain x 2am this morning. Pt is alert and oriented x 4, RR even and unlabored, skin is warm and dry. Assessment completed and pt updated on plan of care. Call bell in reach. Emergency Department Nursing Plan of Care       The Nursing Plan of Care is developed from the Nursing assessment and Emergency Department Attending provider initial evaluation. The plan of care may be reviewed in the ED Provider note.     The Plan of Care was developed with the following considerations:   Patient / Family readiness to learn indicated by:verbalized understanding  Persons(s) to be included in education: patient  Barriers to Learning/Limitations:No    Signed     Cheryle Salk    12/17/2022   3:26 PM  s

## 2022-12-17 NOTE — ED NOTES
Discharge instructions were given to the patient by Manish Plummer RN. The patient left the Emergency Department via wheelchair, alert and oriented and in no acute distress with 2 prescriptions. The patient was encouraged to call or return to the ED for worsening issues or problems and was encouraged to schedule a follow up appointment for continuing care. The patient verbalized understanding of discharge instructions and prescriptions, all questions were answered. The patient has no further concerns at this time.

## 2022-12-17 NOTE — ED PROVIDER NOTES
EMERGENCY DEPARTMENT HISTORY AND PHYSICAL EXAM      Date: 12/17/2022  Patient Name: Mika Phillips    History of Presenting Illness     Chief Complaint   Patient presents with    Back Pain       History Provided By: Patient    HPI: Mika Phillips, 61 y.o. female presents to the ED with cc of low back pain radiating to both legs since yesterday, patient still complains of numbness on both legs, patient came to the emergency department with walking with a cane, states that she does not usually walk with a cane. Denies fecal or urinary incontinence. Denies any recent injuries. There are no other associated symptoms, patient concerns, or physical findings at this time. I reviewed the vital signs, available nursing notes, past medical history, past surgical history, family history and social history. Vital Signs:  Patient Vitals for the past 12 hrs:   Temp Pulse Resp BP SpO2   12/17/22 1434 98.5 °F (36.9 °C) 64 18 (!) 135/99 97 %     Vital signs reviewed. Current Medications:  No current facility-administered medications on file prior to encounter. Current Outpatient Medications on File Prior to Encounter   Medication Sig Dispense Refill    ibuprofen (MOTRIN) 600 mg tablet Take 1 Tab by mouth every six (6) hours as needed for Pain. 20 Tab 0    gabapentin (NEURONTIN) 300 mg capsule Take 300 mg by mouth three (3) times daily. methocarbamol (ROBAXIN) 750 mg tablet Take 750 mg by mouth nightly. potassium chloride SR (KLOR-CON 10) 10 mEq tablet Take 10 mEq by mouth daily. doxepin (SINEQUAN) 10 mg capsule Take 10 mg by mouth nightly. TRAZODONE HCL (TRAZODONE PO) Take  by mouth.      pantoprazole (PROTONIX) 40 mg tablet Take 40 mg by mouth as needed. Cholecalciferol, Vitamin D3, 50,000 unit cap Take  by mouth. TIOTROPIUM BROMIDE (SPIRIVA WITH HANDIHALER IN) Take  by inhalation. sertraline (ZOLOFT) 100 mg tablet Take 100 mg by mouth two (2) times a day. Past History     Past Medical History:  Past Medical History:   Diagnosis Date    Acid reflux     Arthritis     KNEE, BACK    COPD     Depression     Other ill-defined conditions(799.89)     Chronic Back Pain    Psychiatric disorder     bipolar       Past Surgical History:  Past Surgical History:   Procedure Laterality Date    COLONOSCOPY N/A 11/19/2019    COLONOSCOPY performed by Julia Erickson MD at [address] MAIN OR    HX CHOLECYSTECTOMY      HX GYN      oophorectomy, left    HX ORTHOPAEDIC Left CHILD    Fracture repair/knee    HX ORTHOPAEDIC  TEEN    REPAIR FX RIGHT FOREARM    [place] ABDOMEN [place] PROC UNLISTED      Cholecystectomy       Family History:  Family History   Problem Relation Age of Onset    Hypertension Mother     Diabetes Mother     Anesth Problems Neg Hx        Social History:  Social History     Tobacco Use    Smoking status: Every Day     Packs/day: 0.50     Years: 46.00     Pack years: 23.00     Types: Cigarettes    Smokeless tobacco: Never   Substance Use Topics    Alcohol use: Yes     Comment: 2 DRINKS PER MONTH    Drug use: Yes     Types: Heroin     Comment: PT. DENIES 8-15-18       Allergies: Allergies   Allergen Reactions    Latex Rash    Sulfa (Sulfonamide Antibiotics) Rash         Review of Systems   Review of Systems   Constitutional:  Negative for fever. HENT:  Negative for sore throat. Eyes:  Negative for photophobia and redness. Respiratory:  Negative for shortness of breath and wheezing. Cardiovascular:  Negative for chest pain and leg swelling. Gastrointestinal:  Negative for abdominal pain, blood in stool, nausea and vomiting. Genitourinary:  Negative for difficulty urinating, dysuria, hematuria, menstrual problem and vaginal bleeding. Musculoskeletal:  Positive for back pain. Negative for joint swelling. Neurological:  Positive for numbness. Negative for dizziness, seizures, syncope, speech difficulty, weakness and headaches.    Hematological:  Negative for adenopathy. Psychiatric/Behavioral:  Negative for agitation, confusion and suicidal ideas. The patient is not nervous/anxious. All other systems reviewed and are negative. Physical Exam   Physical Exam  Vitals and nursing note reviewed. Exam conducted with a chaperone present. Constitutional:       General: She is not in acute distress. Appearance: Normal appearance. She is well-developed. HENT:      Head: Normocephalic and atraumatic. Mouth/Throat:      Pharynx: No oropharyngeal exudate. Eyes:      General:         Right eye: No discharge. Left eye: No discharge. Extraocular Movements: Extraocular movements intact. Conjunctiva/sclera: Conjunctivae normal.      Pupils: Pupils are equal, round, and reactive to light. Neck:      Vascular: No JVD. Cardiovascular:      Rate and Rhythm: Normal rate and regular rhythm. Heart sounds: Normal heart sounds. Pulmonary:      Effort: Pulmonary effort is normal. No respiratory distress. Breath sounds: Normal breath sounds. No wheezing. Abdominal:      General: Bowel sounds are normal. There is no distension. Palpations: Abdomen is soft. Tenderness: There is no abdominal tenderness. There is no guarding or rebound. Musculoskeletal:         General: No tenderness. Normal range of motion. Cervical back: Normal range of motion and neck supple. Comments: Positive straight leg raising on the left leg. Lymphadenopathy:      Cervical: No cervical adenopathy. Skin:     General: Skin is warm and dry. Capillary Refill: Capillary refill takes less than 2 seconds. Findings: No rash. Neurological:      General: No focal deficit present. Mental Status: She is alert and oriented to person, place, and time. Cranial Nerves: No cranial nerve deficit. Deep Tendon Reflexes: Reflexes are normal and symmetric.    Psychiatric:         Behavior: Behavior normal.       Emergency Department Course ED Course:  Initial assessment performed. The patient's complaints have been discussed, and they are in agreement with the care plan formulated and outlined with them. I have encouraged them to ask questions as they arise throughout their visit. EKG interpretation: (Preliminary)    Medical Decision Making:  Lumbar radiculopathy, herniated lumbar disc, UTI. Critical Care Time:      Procedure:      Progress note:   Time:4:30 pm patient is feeling better, wants to go home. Disposition:  DISCHARGED at 4:45 pm,  I reviewed exam findings, diagnostic results, and clinical impression with patient. Counseled patient on diagnosis and care plan. Encouraged patient to ask questions and discussed need for follow up with primary care and to return to ED precautions. Patient expresses understanding at this time. I have reviewed discharge instructions with the patient and/or family/caregiver who verbalized understanding. The patient has been re-evaluated and is ready for discharge. Discharge instructions have been provided and explained to the patient. Ready for discharge. DISCHARGE PLAN:  1. Current Discharge Medication List        2. Follow-up Information    None       3. Return to ED if current symptoms worsen or new symptoms arise. 4. Follow up with Bro Levine MD in 3-5 days. Diagnosis     Clinical Impression: No diagnosis found.

## 2022-12-21 ENCOUNTER — HOSPITAL ENCOUNTER (EMERGENCY)
Age: 57
Discharge: HOME | End: 2022-12-21
Attending: EMERGENCY MEDICINE
Payer: MEDICAID

## 2022-12-21 VITALS
OXYGEN SATURATION: 98 % | TEMPERATURE: 98 F | RESPIRATION RATE: 16 BRPM | HEART RATE: 56 BPM | SYSTOLIC BLOOD PRESSURE: 138 MMHG | BODY MASS INDEX: 28.22 KG/M2 | DIASTOLIC BLOOD PRESSURE: 85 MMHG | HEIGHT: 67 IN | WEIGHT: 179.8 LBS

## 2022-12-21 DIAGNOSIS — M54.16 LUMBAR RADICULOPATHY: Primary | ICD-10-CM

## 2022-12-21 PROCEDURE — 99283 EMERGENCY DEPT VISIT LOW MDM: CPT

## 2022-12-21 NOTE — ED NOTES
Patient was seen at [facility] with same complaint four days ago, patient states they told her it was an issue with her back.

## 2022-12-21 NOTE — ED TRIAGE NOTES
Patient states she woke up around 2.5 hours ago (3:30AM) and went to the bathroom with no issues. Patient states that she went back to sleep and then woke up with bilateral leg numbness about 2 hours ago (4AM). She states that she can now feel her left lower leg. Patient is able to move both feet without issues. Patient states her only pain is in her lower back which she states is chronic and denies injury.

## 2022-12-21 NOTE — ED PROVIDER NOTES
EMERGENCY DEPARTMENT HISTORY AND PHYSICAL EXAM  ?    Date: 12/21/2022  Patient Name: Afsaneh Kaplan    History of Presenting Illness    Patient presents with:  Extremity Weakness      History Provided By: Patient    HPI: Afsaneh Kaplan, 61 y.o. female with a past medical history significant for bipolar disorder and chronic degenerative lumbar arthritis presents to the ED with cc of waking up this morning with bilateral leg numbness. By the time she arrived to the ER, symptom has resolved. She had a recent visit to [facility] ER for leg pain which sounded like lumbar radiculopathy. CT scan was done at that time which showed severe degenerative arthritis in the lumbar spine. There are no other complaints, changes, or physical findings at this time. PCP: Rhona Tam MD    No current facility-administered medications on file prior to encounter. Current Outpatient Medications on File Prior to Encounter:  predniSONE (DELTASONE) 20 mg tablet, Take 3 Tablets by mouth daily for 5 days. , Disp: 15 Tablet, Rfl: 0  ibuprofen (MOTRIN) 800 mg tablet, Take 1 Tablet by mouth every six (6) hours as needed for Pain for up to 7 days. , Disp: 20 Tablet, Rfl: 0  ibuprofen (MOTRIN) 600 mg tablet, Take 1 Tab by mouth every six (6) hours as needed for Pain., Disp: 20 Tab, Rfl: 0  gabapentin (NEURONTIN) 300 mg capsule, Take 300 mg by mouth three (3) times daily. , Disp: , Rfl:   methocarbamol (ROBAXIN) 750 mg tablet, Take 750 mg by mouth nightly., Disp: , Rfl:   potassium chloride SR (KLOR-CON 10) 10 mEq tablet, Take 10 mEq by mouth daily. , Disp: , Rfl:   doxepin (SINEQUAN) 10 mg capsule, Take 10 mg by mouth nightly., Disp: , Rfl:   TRAZODONE HCL (TRAZODONE PO), Take  by mouth., Disp: , Rfl:   pantoprazole (PROTONIX) 40 mg tablet, Take 40 mg by mouth as needed. , Disp: , Rfl:   Cholecalciferol, Vitamin D3, 50,000 unit cap, Take  by mouth., Disp: , Rfl:    TIOTROPIUM BROMIDE ([address] [place] [address] Graf), Take  by inhalation. , Disp: , Rfl:   sertraline (ZOLOFT) 100 mg tablet, Take 100 mg by mouth two (2) times a day., Disp: , Rfl:         Past History    Past Medical History:  Past Medical History:  No date: Acid reflux  No date: Arthritis      Comment:  KNEE, BACK  No date: COPD  No date: Depression  No date: Other ill-defined conditions(799.89)      Comment:  Chronic Back Pain  No date: Psychiatric disorder      Comment:  bipolar    Past Surgical History:  Past Surgical History:  11/19/2019: COLONOSCOPY; N/A      Comment:  COLONOSCOPY performed by Collins Martino MD at [place] [address]  No date: HX CHOLECYSTECTOMY  No date: HX GYN      Comment:  oophorectomy, left  CHILD: HX ORTHOPAEDIC; Left      Comment:  Fracture repair/knee  TEEN: HX ORTHOPAEDIC      Comment:  REPAIR FX RIGHT FOREARM  No date: [place] ABDOMEN SURGERY PROC UNLISTED      Comment:  Cholecystectomy    Family History:  Review of patient's family history indicates:  Problem: Hypertension      Relation: Mother          Age of Onset: (Not Specified)  Problem: Diabetes      Relation: Mother          Age of Onset: (Not Specified)  Problem: Anesth Problems      Relation: Neg Hx          Age of Onset: (Not Specified)      Social History:  Social History    Tobacco Use      Smoking status: Every Day        Packs/day: 0.50        Years: 46.00        Pack years: 23        Types: Cigarettes      Smokeless tobacco: Never    Alcohol use: Yes      Comment: 2 DRINKS PER MONTH    Drug use: Yes      Types: Heroin      Comment: PT. DENIES 8-15-18      Allergies:   -- Latex -- Rash   -- Sulfa (Sulfonamide Antibiotics) -- Rash      Review of Systems  @[facility]@    Physical Exam  @[facility]@    Diagnostic Study Results    Labs -   No results found for this or any previous visit (from the past 12 hour(s)).     Radiologic Studies -   No orders to display  CT Results  (Last 48 hours)    None      CXR Results  (Last 48 hours)    None          Medical Decision Making  I am the first provider for this patient. I reviewed the vital signs, available nursing notes, past medical history, past surgical history, family history and social history. Vital Signs-Reviewed the patient's vital signs. Empty flowsheet group. Records Reviewed: Nursing Notes    Provider Notes (Medical Decision Making):       ED Course:   Initial assessment performed. The patients presenting problems have been discussed, and they are in agreement with the care plan formulated and outlined with them. I have encouraged them to ask questions as they arise throughout their visit. PLAN:  1. Current Discharge Medication List      2. Follow-up Information     Follow up With Specialties Details Why Contact Info    Ken Carrillo MD Internal Medicine Physician   [address] [place] [address]  [phone]        Return to ED if worse     Diagnosis    Clinical Impression: Lumbar radiculopathy  (primary encounter diagnosis)      ?           Past Medical History:   Diagnosis Date    Acid reflux     Arthritis     KNEE, BACK    COPD     Depression     Other ill-defined conditions(799.89)     Chronic Back Pain    Psychiatric disorder     bipolar       Past Surgical History:   Procedure Laterality Date    COLONOSCOPY N/A 11/19/2019    COLONOSCOPY performed by Rain Ga MD at [address] [facility] OR    HX CHOLECYSTECTOMY      HX GYN      oophorectomy, left    HX ORTHOPAEDIC Left CHILD    Fracture repair/knee    HX ORTHOPAEDIC  TEEN    REPAIR FX RIGHT FOREARM    [place] ABDOMEN SURGERY PROC UNLISTED      Cholecystectomy         Family History:   Problem Relation Age of Onset    Hypertension Mother     Diabetes Mother     Anesth Problems Neg Hx        Social History     Socioeconomic History    Marital status:      Spouse name: Not on file    Number of children: Not on file    Years of education: Not on file    Highest education level: Not on file   Occupational History    Not on file Tobacco Use    Smoking status: Every Day     Packs/day: 0.50     Years: 46.00     Pack years: 23.00     Types: Cigarettes    Smokeless tobacco: Never   Substance and Sexual Activity    Alcohol use: Yes     Comment: 2 DRINKS PER MONTH    Drug use: Yes     Types: Heroin     Comment: PT. DENIES 8-15-18    Sexual activity: Yes     Partners: Male   Other Topics Concern    Not on file   Social History Narrative    Not on file     Social Determinants of Health     Financial Resource Strain: Not on file   Food Insecurity: Not on file   Transportation Needs: Not on file   Physical Activity: Not on file   Stress: Not on file   Social Connections: Not on file   Intimate Partner Violence: Not on file   Housing Stability: Not on file         ALLERGIES: Latex and Sulfa (sulfonamide antibiotics)    Review of Systems   Constitutional: Negative. HENT: Negative. Eyes: Negative. Respiratory: Negative. Cardiovascular: Negative. Gastrointestinal: Negative. Endocrine: Negative. Genitourinary: Negative. Musculoskeletal: Negative. Skin: Negative. Neurological:  Positive for numbness. Bilateral leg numbness   Hematological: Negative. Vitals:    12/21/22 0603 12/21/22 0604 12/21/22 0605 12/21/22 0613   BP: (!) 171/72   138/85   Pulse:       Resp:       Temp:       SpO2:   98%    Weight:  81.6 kg (179 lb 12.8 oz)     Height:  5' 7\" (1.702 m)              Physical Exam  Vitals and nursing note reviewed. Constitutional:       Appearance: Normal appearance. HENT:      Head: Normocephalic and atraumatic. Nose: Nose normal.      Mouth/Throat:      Mouth: Mucous membranes are moist.      Pharynx: Oropharynx is clear. Eyes:      Extraocular Movements: Extraocular movements intact. Conjunctiva/sclera: Conjunctivae normal.      Pupils: Pupils are equal, round, and reactive to light. Cardiovascular:      Rate and Rhythm: Normal rate and regular rhythm. Pulses: Normal pulses. Heart sounds: Normal heart sounds. Pulmonary:      Effort: Pulmonary effort is normal.      Breath sounds: Normal breath sounds. Musculoskeletal:         General: Normal range of motion. Skin:     General: Skin is warm and dry. Neurological:      General: No focal deficit present. Mental Status: She is alert and oriented to person, place, and time. Sensory: No sensory deficit.       Deep Tendon Reflexes: Reflexes normal.   Psychiatric:         Mood and Affect: Mood normal.         Behavior: Behavior normal.        MDM  Risk of Complications, Morbidity, and/or Mortality  Presenting problems: moderate  Diagnostic procedures: minimal  Management options: low    Patient Progress  Patient progress: stable         Procedures

## 2022-12-21 NOTE — ED NOTES
Patient requested an insurance cab, cab called patient notified estimated time of arrival 0930.  Trip # [id number]

## 2022-12-21 NOTE — ED NOTES
Pt hit call bell for nurse and upon entering the room the pt is sitting up on the side of the bed rubbing her legs stating that she can feel her legs now and wants to go home. Pt was told to wait for the doctor to assess her to ensure that she is okay to go home at this time considering her complaint. Pt voiced understanding.

## 2023-01-10 ENCOUNTER — APPOINTMENT (OUTPATIENT)
Dept: MRI IMAGING | Age: 58
End: 2023-01-10
Attending: PHYSICIAN ASSISTANT
Payer: MEDICAID

## 2023-01-10 ENCOUNTER — APPOINTMENT (OUTPATIENT)
Dept: GENERAL RADIOLOGY | Age: 58
End: 2023-01-10
Attending: PHYSICIAN ASSISTANT
Payer: MEDICAID

## 2023-01-10 ENCOUNTER — APPOINTMENT (OUTPATIENT)
Dept: CT IMAGING | Age: 58
End: 2023-01-10
Attending: PHYSICIAN ASSISTANT
Payer: MEDICAID

## 2023-01-10 ENCOUNTER — HOSPITAL ENCOUNTER (EMERGENCY)
Age: 58
Discharge: HOME | End: 2023-01-10
Attending: EMERGENCY MEDICINE
Payer: MEDICAID

## 2023-01-10 VITALS
SYSTOLIC BLOOD PRESSURE: 109 MMHG | HEART RATE: 69 BPM | BODY MASS INDEX: 27.63 KG/M2 | RESPIRATION RATE: 17 BRPM | HEIGHT: 65 IN | WEIGHT: 165.8 LBS | DIASTOLIC BLOOD PRESSURE: 61 MMHG | OXYGEN SATURATION: 96 % | TEMPERATURE: 98.4 F

## 2023-01-10 DIAGNOSIS — R07.9 ACUTE CHEST PAIN: ICD-10-CM

## 2023-01-10 DIAGNOSIS — Q28.2 AVM (ARTERIOVENOUS MALFORMATION) BRAIN: ICD-10-CM

## 2023-01-10 DIAGNOSIS — R29.898 WEAKNESS OF LEFT UPPER EXTREMITY: Primary | ICD-10-CM

## 2023-01-10 DIAGNOSIS — G95.19 EDEMA, SPINAL CORD (HCC): ICD-10-CM

## 2023-01-10 DIAGNOSIS — M48.00 CENTRAL STENOSIS OF SPINAL CANAL: ICD-10-CM

## 2023-01-10 DIAGNOSIS — G95.20 CERVICAL SPINAL CORD COMPRESSION (HCC): ICD-10-CM

## 2023-01-10 LAB
ALBUMIN SERPL-MCNC: 3.6 G/DL (ref 3.5–5)
ALBUMIN/GLOB SERPL: 1 (ref 1.1–2.2)
ALP SERPL-CCNC: 72 U/L (ref 45–117)
ALT SERPL-CCNC: 34 U/L (ref 12–78)
ANION GAP SERPL CALC-SCNC: 6 MMOL/L (ref 5–15)
AST SERPL-CCNC: 19 U/L (ref 15–37)
BASOPHILS # BLD: 0 K/UL (ref 0–0.1)
BASOPHILS NFR BLD: 0 % (ref 0–1)
BILIRUB SERPL-MCNC: 0.3 MG/DL (ref 0.2–1)
BUN SERPL-MCNC: 16 MG/DL (ref 6–20)
BUN/CREAT SERPL: 27 (ref 12–20)
CALCIUM SERPL-MCNC: 8.8 MG/DL (ref 8.5–10.1)
CHLORIDE SERPL-SCNC: 102 MMOL/L (ref 97–108)
CO2 SERPL-SCNC: 34 MMOL/L (ref 21–32)
COMMENT, HOLDF: NORMAL
CREAT SERPL-MCNC: 0.6 MG/DL (ref 0.55–1.02)
DIFFERENTIAL METHOD BLD: ABNORMAL
EOSINOPHIL # BLD: 0.2 K/UL (ref 0–0.4)
EOSINOPHIL NFR BLD: 3 % (ref 0–7)
ERYTHROCYTE [DISTWIDTH] IN BLOOD BY AUTOMATED COUNT: 13.2 % (ref 11.5–14.5)
GLOBULIN SER CALC-MCNC: 3.7 G/DL (ref 2–4)
GLUCOSE BLD STRIP.AUTO-MCNC: 98 MG/DL (ref 65–117)
GLUCOSE SERPL-MCNC: 97 MG/DL (ref 65–100)
HCT VFR BLD AUTO: 42.5 % (ref 35–47)
HGB BLD-MCNC: 13.8 G/DL (ref 11.5–16)
IMM GRANULOCYTES # BLD AUTO: 0 K/UL (ref 0–0.04)
IMM GRANULOCYTES NFR BLD AUTO: 0 % (ref 0–0.5)
INR PPP: 1 (ref 0.9–1.1)
LYMPHOCYTES # BLD: 3.1 K/UL (ref 0.8–3.5)
LYMPHOCYTES NFR BLD: 51 % (ref 12–49)
MCH RBC QN AUTO: 30.8 PG (ref 26–34)
MCHC RBC AUTO-ENTMCNC: 32.5 G/DL (ref 30–36.5)
MCV RBC AUTO: 94.9 FL (ref 80–99)
MONOCYTES # BLD: 0.6 K/UL (ref 0–1)
MONOCYTES NFR BLD: 9 % (ref 5–13)
NEUTS SEG # BLD: 2.2 K/UL (ref 1.8–8)
NEUTS SEG NFR BLD: 37 % (ref 32–75)
NRBC # BLD: 0 K/UL (ref 0–0.01)
NRBC BLD-RTO: 0 PER 100 WBC
PLATELET # BLD AUTO: 203 K/UL (ref 150–400)
PMV BLD AUTO: 11 FL (ref 8.9–12.9)
POTASSIUM SERPL-SCNC: 3.4 MMOL/L (ref 3.5–5.1)
PROT SERPL-MCNC: 7.3 G/DL (ref 6.4–8.2)
PROTHROMBIN TIME: 10 SEC (ref 9–11.1)
RBC # BLD AUTO: 4.48 M/UL (ref 3.8–5.2)
SAMPLES BEING HELD,HOLD: NORMAL
SERVICE CMNT-IMP: NORMAL
SODIUM SERPL-SCNC: 142 MMOL/L (ref 136–145)
TROPONIN-HIGH SENSITIVITY: 7 NG/L (ref 0–51)
TROPONIN-HIGH SENSITIVITY: 8 NG/L (ref 0–51)
WBC # BLD AUTO: 6.1 K/UL (ref 3.6–11)

## 2023-01-10 PROCEDURE — 70496 CT ANGIOGRAPHY HEAD: CPT

## 2023-01-10 PROCEDURE — 96374 THER/PROPH/DIAG INJ IV PUSH: CPT

## 2023-01-10 PROCEDURE — 74011000636 HC RX REV CODE- 636: Performed by: EMERGENCY MEDICINE

## 2023-01-10 PROCEDURE — 70551 MRI BRAIN STEM W/O DYE: CPT

## 2023-01-10 PROCEDURE — 87040 BLOOD CULTURE FOR BACTERIA: CPT

## 2023-01-10 PROCEDURE — 84484 ASSAY OF TROPONIN QUANT: CPT

## 2023-01-10 PROCEDURE — 85610 PROTHROMBIN TIME: CPT

## 2023-01-10 PROCEDURE — 72141 MRI NECK SPINE W/O DYE: CPT

## 2023-01-10 PROCEDURE — 93005 ELECTROCARDIOGRAM TRACING: CPT

## 2023-01-10 PROCEDURE — 99285 EMERGENCY DEPT VISIT HI MDM: CPT

## 2023-01-10 PROCEDURE — 82962 GLUCOSE BLOOD TEST: CPT

## 2023-01-10 PROCEDURE — 71045 X-RAY EXAM CHEST 1 VIEW: CPT

## 2023-01-10 PROCEDURE — 85025 COMPLETE CBC W/AUTO DIFF WBC: CPT

## 2023-01-10 PROCEDURE — 70450 CT HEAD/BRAIN W/O DYE: CPT

## 2023-01-10 PROCEDURE — 36415 COLL VENOUS BLD VENIPUNCTURE: CPT

## 2023-01-10 PROCEDURE — 74011250637 HC RX REV CODE- 250/637: Performed by: PHYSICIAN ASSISTANT

## 2023-01-10 PROCEDURE — 74011250636 HC RX REV CODE- 250/636: Performed by: PHYSICIAN ASSISTANT

## 2023-01-10 PROCEDURE — 80053 COMPREHEN METABOLIC PANEL: CPT

## 2023-01-10 RX ORDER — SODIUM CHLORIDE 9 MG/ML
125 INJECTION, SOLUTION INTRAVENOUS ONCE
Status: COMPLETED | OUTPATIENT
Start: 2023-01-10 | End: 2023-01-10

## 2023-01-10 RX ORDER — LORAZEPAM 2 MG/ML
2 INJECTION INTRAMUSCULAR
Status: COMPLETED | OUTPATIENT
Start: 2023-01-10 | End: 2023-01-10

## 2023-01-10 RX ORDER — ASPIRIN 325 MG
325 TABLET ORAL
Status: COMPLETED | OUTPATIENT
Start: 2023-01-10 | End: 2023-01-10

## 2023-01-10 RX ADMIN — SODIUM CHLORIDE 125 ML/HR: 9 INJECTION, SOLUTION INTRAVENOUS at 11:38

## 2023-01-10 RX ADMIN — ASPIRIN 325 MG ORAL TABLET 325 MG: 325 PILL ORAL at 12:27

## 2023-01-10 RX ADMIN — IOPAMIDOL 100 ML: 755 INJECTION, SOLUTION INTRAVENOUS at 11:24

## 2023-01-10 RX ADMIN — LORAZEPAM 2 MG: 2 INJECTION INTRAMUSCULAR; INTRAVENOUS at 14:02

## 2023-01-10 NOTE — ED NOTES
1028-RN performed assessment/Dr. Keyur Saenz Pa-C at bedside    1031-Level 1 stroke overhead paged    2999-Jtaf-Cunlwoxlj contacted    1035-Blood Sugar checked-value 98/Patient taken to CT    7879-Nzrx-Hvvxswiyd speaking with provider     0998- Patient back from CT    0105-Ybkp-Bktoypwhn speaking with patient     1100-Dysphagia screening completed.      1125- Patient back from CTA

## 2023-01-10 NOTE — ED NOTES
Patient was awakened and asked who to call to come pick her up.  Patient advised her  Gris De Leon, he was called and advised he does not have means to  patient and she just needs to call someone else

## 2023-01-10 NOTE — ED NOTES
Patient has been instructed that they have been given ativan which contains opioids, benzodiazepines, or other sedating drugs. Patient is aware that they  will need to refrain from driving or operating heavy machinery after taking this medication. Patient also instructed that they need to avoid drinking alcohol and using other products containing opioids, benzodiazepines, or other sedating drugs. Patient verbalized understanding.

## 2023-01-10 NOTE — ED TRIAGE NOTES
Patient presents to the ED with c/o left sided chest pain and left arm numbness since waking up this morning approximately one hour ago. Pt reports taking ibuprofen last night. Pt poor historian the story keeps changing in triage. Pt reports vaginal bleeding when urinating this morning.

## 2023-01-10 NOTE — ED NOTES
Unable to wake pt up/keep pt awake for hourly neuro exam due to pt receiving ativan. Pt notably alert while in MRI, prior to medicating.

## 2023-01-10 NOTE — ED NOTES
Pt presents ambulatory to ED complaining of chest pain, back pain, and left sided weakness that she first noticed when she woke up this morning about an hour/hour and a half before arrival. Pt reports decreased sensation and strength on left arm and leg. No facial palsy present. Pt is struggling to find words when speaking and her voice is garbled. Pt denies any hx of stroke, blood clots, or HTN. Pt only endorses hx of COPD. Pt is alert and oriented x 4, RR even and unlabored, skin is warm and dry. Assesment completed and pt updated on plan of care. Emergency Department Nursing Plan of Care       The Nursing Plan of Care is developed from the Nursing assessment and Emergency Department Attending provider initial evaluation. The plan of care may be reviewed in the ED Provider note.     The Plan of Care was developed with the following considerations:   Patient / Family readiness to learn indicated by:verbalized understanding  Persons(s) to be included in education: patient  Barriers to Learning/Limitations:No    Signed     Tg Hoskins RN    1/10/2023   12:16 PM

## 2023-01-10 NOTE — CONSULTS
Discussed with tele neurologist patient not appropriate for [facility] as they are recommending evaluation by neurosurgery.   Services which [facility] does not have at this point for medical floor    Raudel Hurst MD

## 2023-01-10 NOTE — ED NOTES
Pt is still in MRI, they called and stated she is feeling very anxious and in pain spoke with provider who is ordering something for pain

## 2023-01-10 NOTE — ED PROVIDER NOTES
[facility] EMERGENCY DEPT  EMERGENCY DEPARTMENT ENCOUNTER       Pt Name: Selwyn Chappell  MRN: [id number]  [place] 1965  Date of evaluation: 1/10/2023  Provider: Vahid Oro PA-C   PCP: Serenity Frankel MD  Note Started: 10:42 AM 1/10/23     CHIEF COMPLAINT       Chief Complaint   Patient presents with    Numbness    Chest Pain        HISTORY OF PRESENT ILLNESS: 1 or more elements      History From: Patient  HPI Limitations : None     Selwyn Chappell is a 61 y.o. female who presents chest pain, left arm weakness and numbness and difficulty walking this morning with bilateral lower extremity weakness. Patient states she woke up about an hour and a half ago with the symptoms that all started at the same time. She also reports some chronic back pain with intermittent urinary incontinence but denies any bowel incontinence. Nursing Notes were all reviewed and agreed with or any disagreements were addressed in the HPI. REVIEW OF SYSTEMS      Review of Systems   Constitutional:  Negative for chills and fever. Respiratory:  Negative for shortness of breath. Cardiovascular:  Positive for chest pain. Gastrointestinal:  Negative for abdominal pain, nausea and vomiting. Musculoskeletal:  Positive for back pain (chronic) and gait problem. Neurological:  Positive for weakness and numbness. Negative for dizziness, speech difficulty and headaches. Positives and Pertinent negatives as per HPI.     PAST HISTORY     Past Medical History:  Past Medical History:   Diagnosis Date    Acid reflux     Arthritis     KNEE, BACK    COPD     Depression     Other ill-defined conditions(799.89)     Chronic Back Pain    Psychiatric disorder     bipolar       Past Surgical History:  Past Surgical History:   Procedure Laterality Date    COLONOSCOPY N/A 11/19/2019    COLONOSCOPY performed by Janis Carrillo MD at [address] [place] [address] 181      HX GYN      oophorectomy, left    HX ORTHOPAEDIC Left CHILD    Fracture repair/knee    HX ORTHOPAEDIC  TEEN    REPAIR FX RIGHT FOREARM    TX ABDOMEN SURGERY PROC UNLISTED      Cholecystectomy       Family History:  Family History   Problem Relation Age of Onset    Hypertension Mother     Diabetes Mother     Anesth Problems Neg Hx        Social History:  Social History     Tobacco Use    Smoking status: Every Day     Packs/day: 0.50     Years: 46.00     Pack years: 23.00     Types: Cigarettes    Smokeless tobacco: Never   Substance Use Topics    Alcohol use: Yes     Comment: 2 DRINKS PER MONTH    Drug use: Yes     Types: Heroin     Comment: PT. DENIES 8-15-18       Allergies: Allergies   Allergen Reactions    Latex Rash    Sulfa (Sulfonamide Antibiotics) Rash       CURRENT MEDICATIONS      Previous Medications    CHOLECALCIFEROL, VITAMIN D3, 50,000 UNIT CAP    Take  by mouth. DOXEPIN (SINEQUAN) 10 MG CAPSULE    Take 10 mg by mouth nightly. GABAPENTIN (NEURONTIN) 300 MG CAPSULE    Take 300 mg by mouth three (3) times daily. IBUPROFEN (MOTRIN) 600 MG TABLET    Take 1 Tab by mouth every six (6) hours as needed for Pain. METHOCARBAMOL (ROBAXIN) 750 MG TABLET    Take 750 mg by mouth nightly. PANTOPRAZOLE (PROTONIX) 40 MG TABLET    Take 40 mg by mouth as needed. POTASSIUM CHLORIDE SR (KLOR-CON 10) 10 MEQ TABLET    Take 10 mEq by mouth daily. SERTRALINE (ZOLOFT) 100 MG TABLET    Take 100 mg by mouth two (2) times a day. TIOTROPIUM BROMIDE (SPIRIVA WITH HANDIHALER IN)    Take  by inhalation. TRAZODONE HCL (TRAZODONE PO)    Take  by mouth. PHYSICAL EXAM      ED Triage Vitals [01/10/23 1015]   ED Encounter Vitals Group      /79      Pulse (Heart Rate) 61      Resp Rate 16      Temp 98.4 °F (36.9 °C)      Temp src       O2 Sat (%) 97 %      Weight 198 lb      Height 5' 5\"        Physical Exam  Vitals and nursing note reviewed. Constitutional:       General: She is not in acute distress. Appearance: She is well-developed.    HENT: Head: Normocephalic and atraumatic. Eyes:      Conjunctiva/sclera: Conjunctivae normal.   Cardiovascular:      Rate and Rhythm: Normal rate and regular rhythm. Heart sounds: Normal heart sounds. Pulmonary:      Effort: Pulmonary effort is normal. No respiratory distress. Breath sounds: Normal breath sounds. No wheezing or rales. Skin:     General: Skin is warm and dry. Neurological:      Mental Status: She is alert and oriented to person, place, and time. GCS: GCS eye subscore is 4. GCS verbal subscore is 5. GCS motor subscore is 6. Motor: Weakness (noted to lower extremities) present. Pronator drift: slight pronator drift of LUE. Psychiatric:         Attention and Perception: Attention normal.         Mood and Affect: Affect is tearful. Speech: Speech normal.         Behavior: Behavior normal. Behavior is cooperative. Thought Content:  Thought content normal.         Judgment: Judgment normal.        DIAGNOSTIC RESULTS   LABS:     Recent Results (from the past 12 hour(s))   EKG, 12 LEAD, INITIAL    Collection Time: 01/10/23 10:29 AM   Result Value Ref Range    Ventricular Rate 57 BPM    Atrial Rate 57 BPM    P-R Interval 118 ms    QRS Duration 80 ms    Q-T Interval 432 ms    QTC Calculation (Bezet) 420 ms    Calculated P Axis 82 degrees    Calculated R Axis 51 degrees    Calculated T Axis 46 degrees    Diagnosis       Sinus bradycardia with sinus arrhythmia  When compared with ECG of 17-SEP-2017 11:36,  No significant change was found     GLUCOSE, POC    Collection Time: 01/10/23 10:35 AM   Result Value Ref Range    Glucose (POC) 98 65 - 117 mg/dL    Performed by Tracey Rice RN    SAMPLES BEING HELD    Collection Time: 01/10/23 10:37 AM   Result Value Ref Range    SAMPLES BEING HELD 2PST, 2SST, 1BLU, 1RED, 1LAV, 1PINK     COMMENT        Add-on orders for these samples will be processed based on acceptable specimen integrity and analyte stability, which may vary by analyte. CBC WITH AUTOMATED DIFF    Collection Time: 01/10/23 10:37 AM   Result Value Ref Range    WBC 6.1 3.6 - 11.0 K/uL    RBC 4.48 3.80 - 5.20 M/uL    HGB 13.8 11.5 - 16.0 g/dL    HCT 42.5 35.0 - 47.0 %    MCV 94.9 80.0 - 99.0 FL    MCH 30.8 26.0 - 34.0 PG    MCHC 32.5 30.0 - 36.5 g/dL    RDW 13.2 11.5 - 14.5 %    PLATELET 000 546 - 388 K/uL    MPV 11.0 8.9 - 12.9 FL    NRBC 0.0 0  WBC    ABSOLUTE NRBC 0.00 0.00 - 0.01 K/uL    NEUTROPHILS 37 32 - 75 %    LYMPHOCYTES 51 (H) 12 - 49 %    MONOCYTES 9 5 - 13 %    EOSINOPHILS 3 0 - 7 %    BASOPHILS 0 0 - 1 %    IMMATURE GRANULOCYTES 0 0.0 - 0.5 %    ABS. NEUTROPHILS 2.2 1.8 - 8.0 K/UL    ABS. LYMPHOCYTES 3.1 0.8 - 3.5 K/UL    ABS. MONOCYTES 0.6 0.0 - 1.0 K/UL    ABS. EOSINOPHILS 0.2 0.0 - 0.4 K/UL    ABS. BASOPHILS 0.0 0.0 - 0.1 K/UL    ABS. IMM. GRANS. 0.0 0.00 - 0.04 K/UL    DF AUTOMATED     METABOLIC PANEL, COMPREHENSIVE    Collection Time: 01/10/23 10:37 AM   Result Value Ref Range    Sodium 142 136 - 145 mmol/L    Potassium 3.4 (L) 3.5 - 5.1 mmol/L    Chloride 102 97 - 108 mmol/L    CO2 34 (H) 21 - 32 mmol/L    Anion gap 6 5 - 15 mmol/L    Glucose 97 65 - 100 mg/dL    BUN 16 6 - 20 MG/DL    Creatinine 0.60 0.55 - 1.02 MG/DL    BUN/Creatinine ratio 27 (H) 12 - 20      eGFR >60 >60 ml/min/1.73m2    Calcium 8.8 8.5 - 10.1 MG/DL    Bilirubin, total 0.3 0.2 - 1.0 MG/DL    ALT (SGPT) 34 12 - 78 U/L    AST (SGOT) 19 15 - 37 U/L    Alk.  phosphatase 72 45 - 117 U/L    Protein, total 7.3 6.4 - 8.2 g/dL    Albumin 3.6 3.5 - 5.0 g/dL    Globulin 3.7 2.0 - 4.0 g/dL    A-G Ratio 1.0 (L) 1.1 - 2.2     PROTHROMBIN TIME + INR    Collection Time: 01/10/23 10:37 AM   Result Value Ref Range    INR 1.0 0.9 - 1.1      Prothrombin time 10.0 9.0 - 11.1 sec   TROPONIN-HIGH SENSITIVITY    Collection Time: 01/10/23 10:37 AM   Result Value Ref Range    Troponin-High Sensitivity 7 0 - 51 ng/L   TROPONIN-HIGH SENSITIVITY    Collection Time: 01/10/23 12:18 PM   Result Value Ref Range Troponin-High Sensitivity 8 0 - 51 ng/L        EKG: When ordered, EKG's are interpreted by the Emergency Department Physician in the absence of a cardiologist.  Please see their note for interpretation of EKG. RADIOLOGY:  Non-plain film images such as CT, Ultrasound and MRI are read by the radiologist.    Interpretation per the Radiologist below, if available at the time of this note:     MRI CERV SPINE WO CONT    Result Date: 1/10/2023  EXAM: MRI CERV SPINE WO CONT INDICATION: Severe canal stenosis at C3-4 previous CTA. Evaluate for cord edema. Exam: MRI cervical spine. Comparisons: Earlier same day Sequences: Sagittal T1, T2, and STIR. Axial T1, T2. No contrast. FINDINGS: Alignment is normal. Multilevel endplate degenerative change. Subtle edema within the cord posterior to C3-4. Lawernce Roughen Paraspinous soft tissues are within normal limits. Craniocervical junction: Intact. Without stenosis C2-C3: Congenitally narrowed without superimposed disc bulge. There is bilateral uncovertebral degenerative change slightly narrowing the foramen C3-C4: Central disc extrusion extending cranially. Severe narrowing of the canal and flattening of the cord. Severe foraminal narrowing C4-C5: Small disc osteophytic bulge with superimposed disc osteophytic bulge. Moderate severe narrowing of the canal with flattening of the cord. Uncovertebral degenerative change and facet arthropathy. Severe left and moderate right foraminal narrowing C5-C6: Congenitally narrow canal with superimposed left paracentral protrusion. Bilateral uncovertebral degenerative change. Moderate severe narrowing of the canal with flattening of the left side of the cord. Moderate left foraminal narrowing C6-C7: Diffuse disc osteophytic bulge with bilateral uncovertebral degenerative change. Canal stenosis is severe with flattening of the cord with severe narrowing of the foramen C7-T1: Small disc osteophytic bulge and uncovertebral degenerative change.  Moderate narrowing of the canal and mild right greater than left foraminal narrowing. 1. Congenitally narrowed canal with superimposed multilevel degenerative change as detailed by level above most significant at C3-C4 with severe narrowing of the canal and compression of the cord. Associated cord edema     XR CHEST PORT    Result Date: 1/10/2023  EXAM:  XR CHEST PORT INDICATION: Chest pain COMPARISON: 9/17/2017 TECHNIQUE: portable chest AP view FINDINGS: The cardiac silhouette is within normal limits. The pulmonary vasculature is within normal limits. The lungs and pleural spaces are clear. Multilevel spondylosis in the spine. No acute process on portable chest.     CTA CODE NEURO HEAD AND NECK W CONT    Result Date: 1/10/2023  EXAM:  CTA CODE NEURO HEAD AND NECK W CONT INDICATION:   code stroke COMPARISON:  CT head 1/10/2023. CONTRAST:  100 mL of Isovue-370. TECHNIQUE:  Unenhanced  images were obtained to localize the volume for acquisition. Multislice helical axial CT angiography was performed from the aortic arch to the top of the head during uneventful rapid bolus intravenous contrast administration. Coronal and sagittal reformations and 3D post processing was performed. CT dose reduction was achieved through use of a standardized protocol tailored for this examination and automatic exposure control for dose modulation. This study was analyzed by the [address]  algorithm. FINDINGS: CTA Head: There is an arteriovenous malformation in the juxtacortical left frontal lobe with nidus measuring 1.4 x 0.8 x 0.4 cm (series 2 image 112 and series 603 image 118). Arterial supply is predominantly via distal left MCA anterior division branches, possibly also with some contribution from distal left MARYANA branches. There is superficial venous drainage via mildly enlarged cortical veins along the left frontal convexity, draining into the superior sagittal sinus.  There is no evidence of large vessel occlusion or flow-limiting stenosis of the intracranial internal carotid, anterior cerebral, and middle cerebral arteries. Calcification of the bilateral carotid siphons without significant stenosis. The anterior communicating artery is patent. There is no evidence of large vessel occlusion or flow-limiting stenosis of the intracranial vertebral arteries, basilar artery, or posterior cerebral arteries. Incidental proximal basilar artery fenestration. The posterior communicating arteries are patent. There is no evidence of aneurysm. The dural venous sinuses and deep cerebral venous system are patent. CTA NECK: NASCET method was utilized for calculating stenosis. The aortic arch is unremarkable. The common carotid arteries demonstrate no significant stenosis. There is no evidence of significant stenosis in the cervical right internal carotid artery. There is no evidence of significant stenosis in the cervical left internal carotid artery. There is a codominant vertebrobasilar arterial system. The cervical vertebral arteries are normal in course, size and contour without significant stenosis. Visualized soft tissues of the neck are unremarkable. Visualized lung apices are clear. No acute fracture or aggressive osseous lesion. Moderate degenerative disc disease throughout the cervical spine. Suspected severe spinal canal stenosis and cord compression at C3-C4. CTA Head: 1. Arteriovenous malformation in the left frontal lobe with nidus measuring approximately 1.4 x 0.8 x 0.4 cm as described above. 2. No evidence of significant stenosis or aneurysm. CTA Neck: 1. No evidence of significant stenosis. 2. Suspected severe spinal canal stenosis and cord compression at C3-C4. If the patient has myelopathic symptoms, recommend MRI to evaluate for cord edema.  The findings were called to ER on 1/10/2023 11:55 AM by Nilam Collins MD.  [id number]     CT CODE NEURO HEAD WO CONTRAST    Result Date: 1/10/2023  EXAM:  CT CODE NEURO HEAD WO CONTRAST INDICATION:   Stroke COMPARISON: CT head 9/3/2015. TECHNIQUE: Unenhanced CT of the head was performed using 5 mm images. Brain and bone windows were generated. CT dose reduction was achieved through use of a standardized protocol tailored for this examination and automatic exposure control for dose modulation. FINDINGS: The ventricles are normal in size and position. Basilar cisterns are patent. No midline shift. There is no evidence of acute infarct, hemorrhage, or extraaxial fluid collection. The paranasal sinuses, mastoid air cells, and middle ears are clear. The orbital contents are within normal limits. There are no significant osseous or extracranial soft tissue lesions. 1. No evidence of acute intracranial abnormality. MRI CODE NEURO BRAIN WO CONT    Result Date: 1/10/2023  EXAM:  MRI CODE NEURO BRAIN WO CONT INDICATION:    code stroke COMPARISON:  CTA head neck 1/10/2023. CONTRAST: None. TECHNIQUE:  Multiplanar multisequence acquisition without contrast of the brain. FINDINGS: The ventricles are normal in size and position. Redemonstrated left frontal lobe arteriovenous malformation with nidus measuring 1.4 x 0.7 cm (series 8 image 21 and series 6 image 21). There is no surrounding parenchymal edema. No evidence of acute hemorrhage. The brain parenchyma otherwise has normal signal characteristics. There is no acute infarct, extra-axial fluid collection, or mass effect. There is no cerebellar tonsillar herniation. Expected arterial flow-voids are present. The paranasal sinuses, mastoid air cells, and middle ears are clear. The orbital contents are within normal limits. No significant osseous or scalp lesions are identified. Severe spinal canal stenosis with cord compression at C3-C4; please see MRI cervical spine performed concurrently for further details. 1. Redemonstrated left frontal lobe arteriovenous malformation with nidus measuring 1.4 x 0.7 cm, as described in detail on recent CTA.  No evidence of surrounding parenchymal edema, acute hemorrhage, or other acute abnormality. 2. Otherwise unremarkable brain MRI. 3. Severe spinal canal stenosis with cord compression at C3-C4; please see dedicated MRI cervical spine for further details. PROCEDURES   Unless otherwise noted below, none  Procedures     CRITICAL CARE TIME   60 minutes of critical care time provided, exclusive of separately billable procedures. \"      EMERGENCY DEPARTMENT COURSE and DIFFERENTIAL DIAGNOSIS/MDM   Vitals:    Vitals:    01/10/23 1226 01/10/23 1233 01/10/23 1248 01/10/23 1443   BP: 113/74 121/73 108/65 105/66   Pulse: 60 62 62 66   Resp: 17 19 15 17   Temp:       SpO2: 97% 97% 96% 98%   Weight:       Height:            Patient was given the following medications:  Medications   0.9% sodium chloride infusion (125 mL/hr IntraVENous New Bag 1/10/23 1138)   iopamidoL (ISOVUE-370) 370 mg iodine /mL (76 %) injection 100 mL (100 mL IntraVENous Given 1/10/23 1124)   aspirin tablet 325 mg (325 mg Oral Given 1/10/23 1227)   LORazepam (ATIVAN) injection 2 mg (2 mg IntraVENous Given 1/10/23 1402)       Chronic Conditions: Bipolar, chronic back pain, COPD, depression, GERD    Social Determinants affecting Dx or Tx: None    Records Reviewed (source and summary of external records): Nursing Notes, Old Medical Records, Previous electrocardiograms, Previous Radiology Studies, and Previous Laboratory Studies    CC/HPI Summary, DDx, ED Course, and Reassessment: Patient presents with multiple complaints that all started about an hour and a half prior to arrival when she woke up. She states that she is having midsternal chest pain that radiates to the left shoulder, left arm numbness, lower extremity weakness and difficulty ambulating. She reports chronic low back pain with intermittent urinary incontinence. She denies any shortness of breath, nausea, vomiting but does report a headache without any blurry vision or dizziness.   Other ddx to consider: radiculopathy, spinal stenosis, ACS, CAD, stress, electrolyte imbalance. Based on initial symptoms code stroke was called however last known well was bedtime and patient woke up with symptoms 1.5 hours ago. Stroke work-up initiated including CT head CTA head and neck and telemetry neurology consulted for further recommendations-see below in consults        ED Course as of 01/10/23 1633   Tue Rodolfo 10, 2023   1200 Discussed case with ED attending who advises we do not need to wait for MRI results for admission so we can call hospitalist []   [phone] Pt reevaluated and still very drowsy at this time. Will continue to monitor until she is more alert and ready for D/C.   []      ED Course User Index  [] Keli Hayward PA-C     CONSULTS: (Who and What was discussed)  IP CONSULT TO NEUROLOGY  IP CONSULT TO HOSPITALIST   10:43 AM    I spoke with Dr. Viola Simons, Consult for Ryan Rees. Discussed available HPI and clinical findings. He states pt out of window for tPA since last known well bedtime. He recommends CT of head, CTA head and neck, MRI brain and C-spine, 325 aspirin if images normal, blood cultures due to history of drug use although patient denies any recently and may also need to evaluate L-spine if all of the images come back negative, +/- ECHO  Brie Johnson PA-C      12:37 PM    I spoke with Dr. Bay Mercado, Consult for Hospitalist. Discussed available diagnostic tests and clinical findings. He would like to wait for MRI results before admitting pt due to findings on CT that mention cord compression  Brie Johnson PA-C      3:01 PM    I spoke with Dr. Bay Mercado, who would like to consult teleneuro now that MRI findings are back. 3:19 PM    I spoke with Dr. Bay Mercado, He states teleneuro advises that pt needs neurosurgery since no acute CVA seen therefore pt needs to be transferred. Brie Johnson PA-C      3:50 PM    I spoke with Dr. Quyen Long, Consult for Neurosurgery.  Discussed available diagnostic tests and clinical findings. He reviewed chart and states he can follow up with pt on outpatient basis and noted that she already has ortho appt on 1/17/23 with [facility]. He did not recommend any medication therapy at this time  Tiff Madden PA-C      Disposition Considerations (Tests not done, Shared Decision Making, Pt Expectation of Test or Tx.): Acute CVA ruled out with CT and MRI and after discussion with neurosurgeon pt has no acute needs for admission as he states he can follow up with her in the office. Once pt is more alert will plan discharge home with emphasis on strict follow up with Dr Rajinder Alan. FINAL IMPRESSION     1. Weakness of left upper extremity    2. Acute chest pain    3. Cervical spinal cord compression (HCC)    4. Edema, spinal cord ([address]    5. AVM (arteriovenous malformation) brain    6. Central stenosis of spinal canal          DISPOSITION/PLAN   Discharged    Discharge Note: The patient is stable for discharge home. The signs, symptoms, diagnosis, and discharge instructions have been discussed, understanding conveyed, and agreed upon. The patient is to follow up as recommended or return to ER should their symptoms worsen.        PATIENT REFERRED TO:  Follow-up Information       Follow up With Specialties Details Why Contact Info    Idalmis Morales MD Neurosurgery Schedule an appointment as soon as possible for a visit  neurosurgeon to evaluate cord compression seen on CT and  [address] [facility]      Ken Carrillo MD Internal Medicine Physician Schedule an appointment as soon as possible for a visit   [address] [place] [address] [phone]                DISCHARGE MEDICATIONS:  Current Discharge Medication List            DISCONTINUED MEDICATIONS:  Current Discharge Medication List          Shared SYLVIA note: I have seen and evaluated the patient in conjunction with my supervising physician, Moustapha Pastor, Abi Osborn MD.     I am the Primary Clinician of Record. Camila Middleton PA-C (electronically signed)    (Please note that parts of this dictation were completed with voice recognition software. Quite often unanticipated grammatical, syntax, homophones, and other interpretive errors are inadvertently transcribed by the computer software. Please disregards these errors.  Please excuse any errors that have escaped final proofreading.)

## 2023-01-11 LAB
ATRIAL RATE: 57 BPM
CALCULATED P AXIS, ECG09: 82 DEGREES
CALCULATED R AXIS, ECG10: 51 DEGREES
CALCULATED T AXIS, ECG11: 46 DEGREES
DIAGNOSIS, 93000: NORMAL
P-R INTERVAL, ECG05: 118 MS
Q-T INTERVAL, ECG07: 432 MS
QRS DURATION, ECG06: 80 MS
QTC CALCULATION (BEZET), ECG08: 420 MS
VENTRICULAR RATE, ECG03: 57 BPM

## 2023-01-11 NOTE — ED NOTES
Discharge instructions were given to the patient by Delores Art RN. The patient left the Emergency Department ambulatory, alert and oriented and in no acute distress with 0 prescriptions. The patient was encouraged to call or return to the ED for worsening issues or problems and was encouraged to schedule a follow up appointment for continuing care. The patient verbalized understanding of discharge instructions and prescriptions, all questions were answered. The patient has no further concerns at this time.

## 2023-01-15 LAB
BACTERIA SPEC CULT: NORMAL
SERVICE CMNT-IMP: NORMAL

## 2023-01-28 ENCOUNTER — HOSPITAL ENCOUNTER (INPATIENT)
Age: 58
LOS: 2 days | Discharge: LEFT BEFORE DISCHARGE | DRG: 753 | End: 2023-01-30
Attending: PSYCHIATRY & NEUROLOGY | Admitting: PSYCHIATRY & NEUROLOGY
Payer: MEDICAID

## 2023-01-28 ENCOUNTER — HOSPITAL ENCOUNTER (EMERGENCY)
Age: 58
Discharge: OTHER INSTITUTION | End: 2023-01-28
Attending: EMERGENCY MEDICINE
Payer: MEDICAID

## 2023-01-28 VITALS
DIASTOLIC BLOOD PRESSURE: 71 MMHG | WEIGHT: 164 LBS | BODY MASS INDEX: 27.32 KG/M2 | OXYGEN SATURATION: 99 % | HEIGHT: 65 IN | TEMPERATURE: 98.1 F | HEART RATE: 83 BPM | SYSTOLIC BLOOD PRESSURE: 112 MMHG | RESPIRATION RATE: 16 BRPM

## 2023-01-28 DIAGNOSIS — F32.2 SEVERE DEPRESSION (HCC): Primary | ICD-10-CM

## 2023-01-28 DIAGNOSIS — F11.10 HEROIN ABUSE (HCC): ICD-10-CM

## 2023-01-28 DIAGNOSIS — M54.50 CHRONIC BILATERAL LOW BACK PAIN WITHOUT SCIATICA: ICD-10-CM

## 2023-01-28 DIAGNOSIS — G89.29 CHRONIC BILATERAL LOW BACK PAIN WITHOUT SCIATICA: ICD-10-CM

## 2023-01-28 DIAGNOSIS — N39.0 URINARY TRACT INFECTION WITHOUT HEMATURIA, SITE UNSPECIFIED: ICD-10-CM

## 2023-01-28 PROBLEM — F31.9 BIPOLAR DISORDER (HCC): Status: ACTIVE | Noted: 2023-01-28

## 2023-01-28 LAB
AMPHET UR QL SCN: NEGATIVE
ANION GAP SERPL CALC-SCNC: 5 MMOL/L (ref 5–15)
APPEARANCE UR: ABNORMAL
ATRIAL RATE: 68 BPM
BACTERIA URNS QL MICRO: ABNORMAL /HPF
BARBITURATES UR QL SCN: NEGATIVE
BASOPHILS # BLD: 0 K/UL (ref 0–0.1)
BASOPHILS NFR BLD: 0 % (ref 0–1)
BENZODIAZ UR QL: POSITIVE
BILIRUB UR QL: NEGATIVE
BUN SERPL-MCNC: 11 MG/DL (ref 6–20)
BUN/CREAT SERPL: 17 (ref 12–20)
CALCIUM SERPL-MCNC: 8.7 MG/DL (ref 8.5–10.1)
CALCULATED P AXIS, ECG09: 40 DEGREES
CALCULATED R AXIS, ECG10: 63 DEGREES
CALCULATED T AXIS, ECG11: 2 DEGREES
CANNABINOIDS UR QL SCN: NEGATIVE
CHLORIDE SERPL-SCNC: 103 MMOL/L (ref 97–108)
CO2 SERPL-SCNC: 33 MMOL/L (ref 21–32)
COCAINE UR QL SCN: POSITIVE
COLOR UR: ABNORMAL
CREAT SERPL-MCNC: 0.64 MG/DL (ref 0.55–1.02)
DIAGNOSIS, 93000: NORMAL
DIFFERENTIAL METHOD BLD: NORMAL
DRUG SCRN COMMENT,DRGCM: ABNORMAL
EOSINOPHIL # BLD: 0.1 K/UL (ref 0–0.4)
EOSINOPHIL NFR BLD: 1 % (ref 0–7)
EPITH CASTS URNS QL MICRO: ABNORMAL /LPF
ERYTHROCYTE [DISTWIDTH] IN BLOOD BY AUTOMATED COUNT: 13.5 % (ref 11.5–14.5)
ERYTHROCYTE [SEDIMENTATION RATE] IN BLOOD: 46 MM/HR (ref 0–30)
ETHANOL SERPL-MCNC: <10 MG/DL
FLUAV RNA SPEC QL NAA+PROBE: NOT DETECTED
FLUBV RNA SPEC QL NAA+PROBE: NOT DETECTED
GLUCOSE SERPL-MCNC: 90 MG/DL (ref 65–100)
GLUCOSE UR STRIP.AUTO-MCNC: NEGATIVE MG/DL
HCT VFR BLD AUTO: 40.2 % (ref 35–47)
HGB BLD-MCNC: 13.4 G/DL (ref 11.5–16)
HGB UR QL STRIP: ABNORMAL
IMM GRANULOCYTES # BLD AUTO: 0 K/UL (ref 0–0.04)
IMM GRANULOCYTES NFR BLD AUTO: 0 % (ref 0–0.5)
KETONES UR QL STRIP.AUTO: ABNORMAL MG/DL
LEUKOCYTE ESTERASE UR QL STRIP.AUTO: ABNORMAL
LYMPHOCYTES # BLD: 1.6 K/UL (ref 0.8–3.5)
LYMPHOCYTES NFR BLD: 18 % (ref 12–49)
MCH RBC QN AUTO: 30.6 PG (ref 26–34)
MCHC RBC AUTO-ENTMCNC: 33.3 G/DL (ref 30–36.5)
MCV RBC AUTO: 91.8 FL (ref 80–99)
METHADONE UR QL: NEGATIVE
MONOCYTES # BLD: 0.7 K/UL (ref 0–1)
MONOCYTES NFR BLD: 8 % (ref 5–13)
NEUTS SEG # BLD: 6.4 K/UL (ref 1.8–8)
NEUTS SEG NFR BLD: 73 % (ref 32–75)
NITRITE UR QL STRIP.AUTO: POSITIVE
NRBC # BLD: 0 K/UL (ref 0–0.01)
NRBC BLD-RTO: 0 PER 100 WBC
OPIATES UR QL: POSITIVE
P-R INTERVAL, ECG05: 122 MS
PCP UR QL: NEGATIVE
PH UR STRIP: 5.5 (ref 5–8)
PLATELET # BLD AUTO: 219 K/UL (ref 150–400)
PMV BLD AUTO: 10.9 FL (ref 8.9–12.9)
POTASSIUM SERPL-SCNC: 3.7 MMOL/L (ref 3.5–5.1)
PROT UR STRIP-MCNC: 30 MG/DL
Q-T INTERVAL, ECG07: 358 MS
QRS DURATION, ECG06: 94 MS
QTC CALCULATION (BEZET), ECG08: 380 MS
RBC # BLD AUTO: 4.38 M/UL (ref 3.8–5.2)
RBC #/AREA URNS HPF: ABNORMAL /HPF (ref 0–5)
SARS-COV-2, COV2: NOT DETECTED
SODIUM SERPL-SCNC: 141 MMOL/L (ref 136–145)
SP GR UR REFRACTOMETRY: 1.01
UA: UC IF INDICATED,UAUC: ABNORMAL
UROBILINOGEN UR QL STRIP.AUTO: 1 EU/DL (ref 0.2–1)
VENTRICULAR RATE, ECG03: 68 BPM
WBC # BLD AUTO: 8.9 K/UL (ref 3.6–11)
WBC URNS QL MICRO: ABNORMAL /HPF (ref 0–4)

## 2023-01-28 PROCEDURE — 74011250637 HC RX REV CODE- 250/637: Performed by: EMERGENCY MEDICINE

## 2023-01-28 PROCEDURE — 87636 SARSCOV2 & INF A&B AMP PRB: CPT

## 2023-01-28 PROCEDURE — 74011250636 HC RX REV CODE- 250/636: Performed by: EMERGENCY MEDICINE

## 2023-01-28 PROCEDURE — 80048 BASIC METABOLIC PNL TOTAL CA: CPT

## 2023-01-28 PROCEDURE — 82077 ASSAY SPEC XCP UR&BREATH IA: CPT

## 2023-01-28 PROCEDURE — 81001 URINALYSIS AUTO W/SCOPE: CPT

## 2023-01-28 PROCEDURE — 96374 THER/PROPH/DIAG INJ IV PUSH: CPT

## 2023-01-28 PROCEDURE — 85652 RBC SED RATE AUTOMATED: CPT

## 2023-01-28 PROCEDURE — 80307 DRUG TEST PRSMV CHEM ANLYZR: CPT

## 2023-01-28 PROCEDURE — 87186 SC STD MICRODIL/AGAR DIL: CPT

## 2023-01-28 PROCEDURE — 90791 PSYCH DIAGNOSTIC EVALUATION: CPT

## 2023-01-28 PROCEDURE — 93005 ELECTROCARDIOGRAM TRACING: CPT

## 2023-01-28 PROCEDURE — 87086 URINE CULTURE/COLONY COUNT: CPT

## 2023-01-28 PROCEDURE — 65220000003 HC RM SEMIPRIVATE PSYCH

## 2023-01-28 PROCEDURE — 87077 CULTURE AEROBIC IDENTIFY: CPT

## 2023-01-28 PROCEDURE — 99285 EMERGENCY DEPT VISIT HI MDM: CPT

## 2023-01-28 PROCEDURE — 36415 COLL VENOUS BLD VENIPUNCTURE: CPT

## 2023-01-28 PROCEDURE — 85025 COMPLETE CBC W/AUTO DIFF WBC: CPT

## 2023-01-28 RX ORDER — KETOROLAC TROMETHAMINE 30 MG/ML
15 INJECTION, SOLUTION INTRAMUSCULAR; INTRAVENOUS
Status: DISCONTINUED | OUTPATIENT
Start: 2023-01-28 | End: 2023-01-28 | Stop reason: HOSPADM

## 2023-01-28 RX ORDER — SODIUM CHLORIDE 0.9 % (FLUSH) 0.9 %
5-40 SYRINGE (ML) INJECTION EVERY 8 HOURS
Status: DISCONTINUED | OUTPATIENT
Start: 2023-01-28 | End: 2023-01-28 | Stop reason: HOSPADM

## 2023-01-28 RX ORDER — CEPHALEXIN 500 MG/1
500 CAPSULE ORAL 3 TIMES DAILY
Status: DISCONTINUED | OUTPATIENT
Start: 2023-01-28 | End: 2023-01-28 | Stop reason: HOSPADM

## 2023-01-28 RX ORDER — ACETAMINOPHEN 500 MG
1000 TABLET ORAL
Status: COMPLETED | OUTPATIENT
Start: 2023-01-28 | End: 2023-01-28

## 2023-01-28 RX ORDER — SODIUM CHLORIDE 0.9 % (FLUSH) 0.9 %
5-40 SYRINGE (ML) INJECTION AS NEEDED
Status: DISCONTINUED | OUTPATIENT
Start: 2023-01-28 | End: 2023-01-28 | Stop reason: HOSPADM

## 2023-01-28 RX ORDER — DIAZEPAM 5 MG/1
5 TABLET ORAL
Status: COMPLETED | OUTPATIENT
Start: 2023-01-28 | End: 2023-01-28

## 2023-01-28 RX ORDER — KETOROLAC TROMETHAMINE 30 MG/ML
30 INJECTION, SOLUTION INTRAMUSCULAR; INTRAVENOUS
Status: COMPLETED | OUTPATIENT
Start: 2023-01-28 | End: 2023-01-28

## 2023-01-28 RX ADMIN — DIAZEPAM 5 MG: 5 TABLET ORAL at 09:29

## 2023-01-28 RX ADMIN — ACETAMINOPHEN 1000 MG: 500 TABLET ORAL at 21:47

## 2023-01-28 RX ADMIN — CEPHALEXIN 500 MG: 500 CAPSULE ORAL at 13:19

## 2023-01-28 RX ADMIN — CEPHALEXIN 500 MG: 500 CAPSULE ORAL at 21:47

## 2023-01-28 RX ADMIN — KETOROLAC TROMETHAMINE 30 MG: 30 INJECTION, SOLUTION INTRAMUSCULAR; INTRAVENOUS at 09:49

## 2023-01-28 NOTE — ED TRIAGE NOTES
Pt reports chronic pain flare up x 2 days, Depression with SI x 1 week, tried reaching out to [facility], but was told her doctor was out of the office
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
## 2023-01-28 NOTE — ED PROVIDER NOTES
[facility] EMERGENCY DEPT  EMERGENCY DEPARTMENT ENCOUNTER       Pt Name: Nemo Urbina  MRN: [id number]  [place] 1965  Date of evaluation: 1/28/2023  Provider: Didier Alston MD   PCP: Brandt Corea MD  Note Started: 9:41 AM 1/28/23     CHIEF COMPLAINT       Chief Complaint   Patient presents with    Mental Health Problem    Pain (Chronic)        HISTORY OF PRESENT ILLNESS: 1 or more elements      History From: Patient  HPI Limitations : None     Nemo Urbina is a 61 y.o. female who presents with feeling depressed and back pain. Patient has a significant history of schizoaffective disorder, hypertension and substance abuse. Patient admits to heroin use yesterday. She states she snorts. She denies any IV drug abuse. She is here with a history of chronic back pain that has decelerated over the last 2 to 3 days. She complains of general weakness in her legs but nothing focal.  She denies associated numbness or bowel or bladder incontinence. She denies any prior surgical history on her spine. Her pain is rated at 7/10 and is described as aching. She states the pain is made her feel depressed and tearful. She denies any homicidal suicidal thoughts. She is currently on Zoloft. Nursing Notes were all reviewed and agreed with or any disagreements were addressed in the HPI. REVIEW OF SYSTEMS      Review of Systems   Constitutional:  Negative for chills and fever. HENT:  Negative for congestion and facial swelling. Eyes:  Negative for pain. Gastrointestinal:  Negative for abdominal pain and nausea. Endocrine: Positive for polydipsia. Negative for polyphagia and polyuria. Genitourinary:  Negative for difficulty urinating, flank pain and pelvic pain. Musculoskeletal:  Positive for back pain. Skin:  Negative for rash. Neurological:  Negative for dizziness, tremors, facial asymmetry, weakness and numbness. Hematological:  Negative for adenopathy. Psychiatric/Behavioral:  Positive for dysphoric mood. Negative for behavioral problems, decreased concentration, hallucinations and sleep disturbance. All other systems reviewed and are negative. Positives and Pertinent negatives as per HPI. PAST HISTORY     Past Medical History:  Past Medical History:   Diagnosis Date    Acid reflux     Arthritis     KNEE, BACK    COPD     Depression     Other ill-defined conditions(799.89)     Chronic Back Pain    Psychiatric disorder     bipolar       Past Surgical History:  Past Surgical History:   Procedure Laterality Date    COLONOSCOPY N/A 11/19/2019    COLON[facility] performed by Morris Salvador MD [facility] MA[facility] OR    HX CHOLECYSTECTOMY      HX GYN      oophorectomy, left    HX ORTHOPAEDIC Left CHILD    Fracture repair/knee    HX ORTHOPAEDIC  TEEN    REPAIR FX RIGHT FOREARM    OR ABDOMEN SURGERY PROC UNLISTED      Cholecystectomy       Family History:  Family History   Problem Relation Age of Onset    Hypertension Mother     Diabetes Mother     Anesth Problems Neg Hx        Social History:  Social History     Tobacco Use    Smoking status: Every Day     Packs/day: 0.50     Years: 46.00     Pack years: 23.00     Types: Cigarettes    Smokeless tobacco: Never   Substance Use Topics    Alcohol use: Yes     Comment: 2 DRINKS PER MONTH    Drug use: Yes     Types: Heroin     Comment: PT. DENIES 8-15-18       Allergies: Allergies   Allergen Reactions    Latex Rash    Sulfa (Sulfonamide Antibiotics) Rash       CURRENT MEDICATIONS      Previous Medications    CHOLECALCIFEROL, VITAMIN D3, 50,000 UNIT CAP    Take  by mouth. DOXEPIN (SINEQUAN) 10 MG CAPSULE    Take 10 mg by mouth nightly. GABAPENTIN (NEURONTIN) 300 MG CAPSULE    Take 300 mg by mouth three (3) times daily. IBUPROFEN (MOTRIN) 600 MG TABLET    Take 1 Tab by mouth every six (6) hours as needed for Pain. METHOCARBAMOL (ROBAXIN) 750 MG TABLET    Take 750 mg by mouth nightly.     PANTOPRAZOLE (PROTONIX) 40 MG TABLET    Take 40 mg by mouth as needed. POTASSIUM CHLORIDE SR (KLOR-CON 10) 10 MEQ TABLET    Take 10 mEq by mouth daily. SERTRALINE (ZOLOFT) 100 MG TABLET    Take 100 mg by mouth two (2) times a day. TIOTROPIUM BROMIDE (SPIRIVA WITH HANDIHALER IN)    Take  by inhalation. TRAZODONE HCL (TRAZODONE PO)    Take  by mouth. PHYSICAL EXAM      ED Triage Vitals [01/28/23 0837]   ED Encounter Vitals Group      BP (!) 151/131      Pulse (Heart Rate) 74      Resp Rate 22      Temp 99.2 °F (37.3 °C)      Temp src       O2 Sat (%) 96 %      Weight 164 lb      Height 5' 5\"        Physical Exam  Vitals and nursing note reviewed. Constitutional:       Appearance: Normal appearance. She is not diaphoretic. HENT:      Right Ear: Tympanic membrane normal.      Left Ear: Tympanic membrane normal.      Nose: Nose normal. No congestion or rhinorrhea. Eyes:      General:         Right eye: No discharge. Left eye: No discharge. Pupils: Pupils are equal, round, and reactive to light. Cardiovascular:      Rate and Rhythm: Normal rate and regular rhythm. Pulses: Normal pulses. Heart sounds: Normal heart sounds. Pulmonary:      Effort: Pulmonary effort is normal.      Breath sounds: Normal breath sounds. Abdominal:      General: Abdomen is flat. Tenderness: There is no abdominal tenderness. There is no guarding. Musculoskeletal:         General: Normal range of motion. Cervical back: Normal, normal range of motion and neck supple. Thoracic back: Normal.      Lumbar back: Spasms and tenderness present. No bony tenderness. Normal range of motion.  Negative right straight leg raise test and negative left straight leg raise test.        Back:       Comments: Palpable midline intervertebral tenderness in the lumbar spine, no overlying induration redness or warmth noted    Negative straight leg raise test.    Patient has 5/5 strength with dorsiflexion and plantar flexion in both legs. Neurological:      Mental Status: She is alert. 11:37 AM patient was initially ambulatory to the bed without assistance. On reexam patient has strong strength and both lower legs with 5/5 flexion at the hip as well as 5/5 strength with dorsiflexion and plantar flexion. She denies any loss of bowel or bladder function or loss of sensation when wiping. Patient states her pain has improved she in fact is resting peacefully. We will see if she can ambulate with improvement. At this time her white blood count is normal with only a mildly elevated sed rate so doubt this represents a space-occupying infection such as discitis or spinal infection. SHe does not use IV drugs she snorts heroin. 11:49 AM the records and patient just had a CT lumbar spine in December that showed degenerative changes. Discussed with her the possibility of an infectious process and need for MRI if we thought that it at this time she declines. Does request consultation with psychiatry for her depression. 1:05 PM psychiatry has seen the patient and have concerns about her depression leading to possible suicidal ideation. The plan to admit. Patient made aware of her urinary tract so we will start antibiotics.          DIAGNOSTIC RESULTS   LABS:     Recent Results (from the past 12 hour(s))   METABOLIC PANEL, BASIC    Collection Time: 01/28/23  9:24 AM   Result Value Ref Range    Sodium 141 136 - 145 mmol/L    Potassium 3.7 3.5 - 5.1 mmol/L    Chloride 103 97 - 108 mmol/L    CO2 33 (H) 21 - 32 mmol/L    Anion gap 5 5 - 15 mmol/L    Glucose 90 65 - 100 mg/dL    BUN 11 6 - 20 MG/DL    Creatinine 0.64 0.55 - 1.02 MG/DL    BUN/Creatinine ratio 17 12 - 20      eGFR >60 >60 ml/min/1.73m2    Calcium 8.7 8.5 - 10.1 MG/DL   CBC WITH AUTOMATED DIFF    Collection Time: 01/28/23 10:27 AM   Result Value Ref Range    WBC 8.9 3.6 - 11.0 K/uL    RBC 4.38 3.80 - 5.20 M/uL    HGB 13.4 11.5 - 16.0 g/dL    HCT 40.2 35.0 - 47.0 %    MCV 91.8 80.0 - 99.0 FL    MCH 30.6 26.0 - 34.0 PG    MCHC 33.3 30.0 - 36.5 g/dL    RDW 13.5 11.5 - 14.5 %    PLATELET 888 585 - 954 K/uL    MPV 10.9 8.9 - 12.9 FL    NRBC 0.0 0  WBC    ABSOLUTE NRBC 0.00 0.00 - 0.01 K/uL    NEUTROPHILS 73 32 - 75 %    LYMPHOCYTES 18 12 - 49 %    MONOCYTES 8 5 - 13 %    EOSINOPHILS 1 0 - 7 %    BASOPHILS 0 0 - 1 %    IMMATURE GRANULOCYTES 0 0.0 - 0.5 %    ABS. NEUTROPHILS 6.4 1.8 - 8.0 K/UL    ABS. LYMPHOCYTES 1.6 0.8 - 3.5 K/UL    ABS. MONOCYTES 0.7 0.0 - 1.0 K/UL    ABS. EOSINOPHILS 0.1 0.0 - 0.4 K/UL    ABS. BASOPHILS 0.0 0.0 - 0.1 K/UL    ABS. IMM.  GRANS. 0.0 0.00 - 0.04 K/UL    DF AUTOMATED     SED RATE (ESR)    Collection Time: 01/28/23 10:27 AM   Result Value Ref Range    Sed rate, automated 46 (H) 0 - 30 mm/hr   URINALYSIS W/ REFLEX CULTURE    Collection Time: 01/28/23 12:37 PM    Specimen: Urine   Result Value Ref Range    Color YELLOW/STRAW      Appearance TURBID (A) CLEAR      Specific gravity 1.015      pH (UA) 5.5 5.0 - 8.0      Protein 30 (A) NEG mg/dL    Glucose Negative NEG mg/dL    Ketone TRACE (A) NEG mg/dL    Bilirubin Negative NEG      Blood LARGE (A) NEG      Urobilinogen 1.0 0.2 - 1.0 EU/dL    Nitrites Positive (A) NEG      Leukocyte Esterase LARGE (A) NEG      WBC 20-50 0 - 4 /hpf    RBC 0-5 0 - 5 /hpf    Epithelial cells MODERATE (A) FEW /lpf    Bacteria 4+ (A) NEG /hpf    UA:UC IF INDICATED URINE CULTURE ORDERED (A) CNI     DRUG SCREEN, URINE    Collection Time: 01/28/23 12:38 PM   Result Value Ref Range    AMPHETAMINES Negative NEG      BARBITURATES Negative NEG      BENZODIAZEPINES Positive (A) NEG      COCAINE Positive (A) NEG      METHADONE Negative NEG      OPIATES Positive (A) NEG      PCP(PHENCYCLIDINE) Negative NEG      THC (TH-CANNABINOL) Negative NEG      Drug screen comment (NOTE)    EKG, 12 LEAD, INITIAL    Collection Time: 01/28/23  1:35 PM   Result Value Ref Range    Ventricular Rate 68 BPM    Atrial Rate 68 BPM    P-R Interval 122 ms    QRS Duration 94 ms    Q-T Interval 358 ms    QTC Calculation (Bezet) 380 ms    Calculated P Axis 40 degrees    Calculated R Axis 63 degrees    Calculated T Axis 2 degrees    Diagnosis       Normal sinus rhythm with sinus arrhythmia  Normal ECG  When compared with ECG of 10-GARY-2023 10:29,  ST no longer depressed in Inferior leads  Inverted T waves have replaced nonspecific T wave abnormality in Inferior   leads          EKG: Normal sinus rhythm with sinus arrhythmia at a rate of 68, no ST elevation, no QT prolongation     RADIOLOGY:  Non-plain film images such as CT, Ultrasound and MRI are read by the radiologist. Plain radiographic images are visualized and preliminarily interpreted by the ED Provider with the below findings:          Interpretation per the Radiologist below, if available at the time of this note:     No results found. PROCEDURES   Unless otherwise noted below, none  Procedures     CRITICAL CARE TIME       EMERGENCY DEPARTMENT COURSE and DIFFERENTIAL DIAGNOSIS/MDM   Vitals:    Vitals:    01/28/23 0837   BP: (!) 151/131   Pulse: 74   Resp: 22   Temp: 99.2 °F (37.3 °C)   SpO2: 96%   Weight: 74.4 kg (164 lb)   Height: 5' 5\" (1.651 m)        Patient was given the following medications:  Medications   sodium chloride (NS) flush 5-40 mL (has no administration in time range)   sodium chloride (NS) flush 5-40 mL (has no administration in time range)   cephALEXin (KEFLEX) capsule 500 mg (500 mg Oral Given 1/28/23 1319)   ketorolac (TORADOL) injection 30 mg (30 mg IntraVENous Given 1/28/23 0949)   diazePAM (VALIUM) tablet 5 mg (5 mg Oral Given 1/28/23 0929)       CONSULTS: (Who and What was discussed)  None    Chronic Conditions: Chronic back pain, schizophrenia, hypertension, heroin abuse    Social Determinants affecting Dx or Tx: Patient has a substance abuse problem.      Records Reviewed (source and summary of external notes): Previous Radiology studies and Previous Laboratory studies    CC/HPI Summary, DDx, ED Course, and Reassessment: Differential diagnosis-chronic back pain with acute exacerbation, UTI, pyelonephritis, herniated disc, spinal cord syndrome, discitis or spinal cord infection, depression, anxiety    Impression/plan-61year-old female with complex history including substance abuse and depression. Clinically has significant low back pain but nothing midline or focally tender. She does have a low-grade fever. She denies any bowel or bladder incontinence or gross weakness. Due to her substance abuse history we will check labs including CBC and sed rate. Plan will be to initially treat her pain with anti-inflammatory and muscle relaxant. We will monitor her response to treatment and lab results which will dictate whether we perform any further testing including x-rays or CT. Disposition Considerations (Tests not done, Shared Decision Making, Pt Expectation of Test or Tx.): Discussed her elevated sed rate and back pain. Made her aware of the risk of discitis or spinal infection. Made her aware that an MRI would be the best test.  At this time she declines that test.  She had a CT in December. She is aware of the risk of missing a spinal abscess which could be infection, sepsis, weakness, paralysis or death. FINAL IMPRESSION     1. Severe depression ([address]    2. Chronic bilateral low back pain without sciatica    3. Heroin abuse ([facility] [address]    4. Urinary tract infection without hematuria, site unspecified          DISPOSITION/PLAN   Admitted    Admit Note: Pt is being admitted by psychiatry. The results of their tests and reason(s) for their admission have been discussed with pt and/or available family. They convey agreement and understanding for the need to be admitted and for the admission diagnosis.      PATIENT REFERRED TO:  Follow-up Information    None           DISCHARGE MEDICATIONS:  Current Discharge Medication List            DISCONTINUED MEDICATIONS:  Current Discharge Medication List          I am the Primary Clinician of Record. Jass Vilchis MD (electronically signed)    (Please note that parts of this dictation were completed with voice recognition software. Quite often unanticipated grammatical, syntax, homophones, and other interpretive errors are inadvertently transcribed by the computer software. Please disregards these errors.  Please excuse any errors that have escaped final proofreading.)

## 2023-01-29 LAB
BACTERIA SPEC CULT: ABNORMAL
CC UR VC: ABNORMAL
SERVICE CMNT-IMP: ABNORMAL

## 2023-01-29 PROCEDURE — 74011250637 HC RX REV CODE- 250/637: Performed by: NURSE PRACTITIONER

## 2023-01-29 PROCEDURE — 74011250636 HC RX REV CODE- 250/636: Performed by: NURSE PRACTITIONER

## 2023-01-29 PROCEDURE — 65220000001 HC RM PRIVATE PSYCH

## 2023-01-29 RX ORDER — HYDROXYZINE 50 MG/1
50 TABLET, FILM COATED ORAL
Status: DISCONTINUED | OUTPATIENT
Start: 2023-01-29 | End: 2023-01-30 | Stop reason: HOSPADM

## 2023-01-29 RX ORDER — DIPHENHYDRAMINE HYDROCHLORIDE 50 MG/ML
50 INJECTION, SOLUTION INTRAMUSCULAR; INTRAVENOUS
Status: DISCONTINUED | OUTPATIENT
Start: 2023-01-29 | End: 2023-01-30 | Stop reason: HOSPADM

## 2023-01-29 RX ORDER — BENZTROPINE MESYLATE 1 MG/1
1 TABLET ORAL
Status: DISCONTINUED | OUTPATIENT
Start: 2023-01-29 | End: 2023-01-30 | Stop reason: HOSPADM

## 2023-01-29 RX ORDER — SERTRALINE HYDROCHLORIDE 50 MG/1
200 TABLET, FILM COATED ORAL DAILY
Status: DISCONTINUED | OUTPATIENT
Start: 2023-01-30 | End: 2023-01-30 | Stop reason: HOSPADM

## 2023-01-29 RX ORDER — METHOCARBAMOL 500 MG/1
500 TABLET, FILM COATED ORAL
Status: DISCONTINUED | OUTPATIENT
Start: 2023-01-29 | End: 2023-01-30 | Stop reason: HOSPADM

## 2023-01-29 RX ORDER — CLONIDINE HYDROCHLORIDE 0.1 MG/1
0.1 TABLET ORAL
Qty: 1 TABLET | Refills: 0 | Status: SHIPPED
Start: 2023-01-29

## 2023-01-29 RX ORDER — ACETAMINOPHEN 325 MG/1
650 TABLET ORAL
Status: DISCONTINUED | OUTPATIENT
Start: 2023-01-29 | End: 2023-01-30 | Stop reason: HOSPADM

## 2023-01-29 RX ORDER — METHOCARBAMOL 500 MG/1
500 TABLET, FILM COATED ORAL
Qty: 1 TABLET | Refills: 0 | Status: SHIPPED
Start: 2023-01-29

## 2023-01-29 RX ORDER — SERTRALINE HYDROCHLORIDE 100 MG/1
200 TABLET, FILM COATED ORAL DAILY
Qty: 1 TABLET | Refills: 0 | Status: SHIPPED
Start: 2023-01-30

## 2023-01-29 RX ORDER — CEPHALEXIN 500 MG/1
500 CAPSULE ORAL 3 TIMES DAILY
Status: DISCONTINUED | OUTPATIENT
Start: 2023-01-29 | End: 2023-01-30 | Stop reason: HOSPADM

## 2023-01-29 RX ORDER — CLONIDINE HYDROCHLORIDE 0.1 MG/1
0.1 TABLET ORAL
Status: DISCONTINUED | OUTPATIENT
Start: 2023-01-29 | End: 2023-01-30 | Stop reason: HOSPADM

## 2023-01-29 RX ORDER — BUPRENORPHINE HYDROCHLORIDE AND NALOXONE HYDROCHLORIDE DIHYDRATE 8; 2 MG/1; MG/1
0.5 TABLET SUBLINGUAL DAILY
Status: DISCONTINUED | OUTPATIENT
Start: 2023-01-29 | End: 2023-01-29

## 2023-01-29 RX ORDER — BUPRENORPHINE HYDROCHLORIDE AND NALOXONE HYDROCHLORIDE DIHYDRATE 8; 2 MG/1; MG/1
1 TABLET SUBLINGUAL 2 TIMES DAILY
Status: DISCONTINUED | OUTPATIENT
Start: 2023-01-29 | End: 2023-01-29

## 2023-01-29 RX ORDER — ADHESIVE BANDAGE
30 BANDAGE TOPICAL DAILY PRN
Status: DISCONTINUED | OUTPATIENT
Start: 2023-01-29 | End: 2023-01-30 | Stop reason: HOSPADM

## 2023-01-29 RX ORDER — CEPHALEXIN 500 MG/1
500 CAPSULE ORAL 3 TIMES DAILY
Qty: 1 CAPSULE | Refills: 0 | Status: SHIPPED
Start: 2023-01-29 | End: 2023-02-05

## 2023-01-29 RX ORDER — DM/P-EPHED/ACETAMINOPH/DOXYLAM 30-7.5/3
2 LIQUID (ML) ORAL
Status: DISCONTINUED | OUTPATIENT
Start: 2023-01-29 | End: 2023-01-30 | Stop reason: HOSPADM

## 2023-01-29 RX ORDER — LORAZEPAM 2 MG/ML
1 INJECTION, SOLUTION INTRAMUSCULAR; INTRAVENOUS
Status: DISCONTINUED | OUTPATIENT
Start: 2023-01-29 | End: 2023-01-30 | Stop reason: HOSPADM

## 2023-01-29 RX ORDER — CLONIDINE HYDROCHLORIDE 0.1 MG/1
0.1 TABLET ORAL
Status: DISCONTINUED | OUTPATIENT
Start: 2023-01-29 | End: 2023-01-29

## 2023-01-29 RX ORDER — HALOPERIDOL 5 MG/ML
5 INJECTION INTRAMUSCULAR
Status: DISCONTINUED | OUTPATIENT
Start: 2023-01-29 | End: 2023-01-30 | Stop reason: HOSPADM

## 2023-01-29 RX ORDER — BUPRENORPHINE HYDROCHLORIDE AND NALOXONE HYDROCHLORIDE DIHYDRATE 8; 2 MG/1; MG/1
1 TABLET SUBLINGUAL 2 TIMES DAILY
Status: DISCONTINUED | OUTPATIENT
Start: 2023-01-29 | End: 2023-01-30 | Stop reason: HOSPADM

## 2023-01-29 RX ORDER — TRAZODONE HYDROCHLORIDE 50 MG/1
50 TABLET ORAL
Status: DISCONTINUED | OUTPATIENT
Start: 2023-01-29 | End: 2023-01-30 | Stop reason: HOSPADM

## 2023-01-29 RX ORDER — OLANZAPINE 5 MG/1
5 TABLET ORAL
Status: DISCONTINUED | OUTPATIENT
Start: 2023-01-29 | End: 2023-01-30 | Stop reason: HOSPADM

## 2023-01-29 RX ORDER — HYDROXYZINE 50 MG/1
50 TABLET, FILM COATED ORAL ONCE
Status: COMPLETED | OUTPATIENT
Start: 2023-01-29 | End: 2023-01-29

## 2023-01-29 RX ORDER — LOPERAMIDE HYDROCHLORIDE 2 MG/1
2 CAPSULE ORAL
Status: DISCONTINUED | OUTPATIENT
Start: 2023-01-29 | End: 2023-01-30 | Stop reason: HOSPADM

## 2023-01-29 RX ORDER — BUPRENORPHINE HYDROCHLORIDE AND NALOXONE HYDROCHLORIDE DIHYDRATE 8; 2 MG/1; MG/1
1 TABLET SUBLINGUAL DAILY
Status: DISCONTINUED | OUTPATIENT
Start: 2023-01-29 | End: 2023-01-29

## 2023-01-29 RX ORDER — LORAZEPAM 0.5 MG/1
0.5 TABLET ORAL 2 TIMES DAILY
Status: DISCONTINUED | OUTPATIENT
Start: 2023-01-29 | End: 2023-01-30 | Stop reason: HOSPADM

## 2023-01-29 RX ADMIN — LORAZEPAM 0.5 MG: 0.5 TABLET ORAL at 12:36

## 2023-01-29 RX ADMIN — HYDROXYZINE HYDROCHLORIDE 50 MG: 50 TABLET ORAL at 10:17

## 2023-01-29 RX ADMIN — METHOCARBAMOL 500 MG: 500 TABLET ORAL at 12:36

## 2023-01-29 RX ADMIN — HYDROXYZINE HYDROCHLORIDE 50 MG: 50 TABLET ORAL at 01:47

## 2023-01-29 RX ADMIN — BUPRENORPHINE HYDROCHLORIDE AND NALOXONE HYDROCHLORIDE DIHYDRATE 1 TABLET: 8; 2 TABLET SUBLINGUAL at 09:00

## 2023-01-29 RX ADMIN — TRAZODONE HYDROCHLORIDE 50 MG: 50 TABLET ORAL at 01:47

## 2023-01-29 RX ADMIN — CEPHALEXIN 500 MG: 500 CAPSULE ORAL at 08:53

## 2023-01-29 RX ADMIN — CLONIDINE HYDROCHLORIDE 0.1 MG: 0.1 TABLET ORAL at 10:59

## 2023-01-29 RX ADMIN — ACETAMINOPHEN 650 MG: 325 TABLET ORAL at 10:17

## 2023-01-29 RX ADMIN — CEPHALEXIN 500 MG: 500 CAPSULE ORAL at 21:40

## 2023-01-29 RX ADMIN — ACETAMINOPHEN 650 MG: 325 TABLET ORAL at 21:44

## 2023-01-29 RX ADMIN — OLANZAPINE 5 MG: 5 TABLET, FILM COATED ORAL at 12:35

## 2023-01-29 RX ADMIN — HYDROXYZINE HYDROCHLORIDE 50 MG: 50 TABLET ORAL at 12:36

## 2023-01-29 NOTE — BH NOTES
TRANSFER - OUT REPORT:    Verbal report given to [facility] on Jeremiah Watts being transferred to Acute (unit) for urgent transfer       Report consisted of patients Situation, Background, Assessment and   Recommendations(SBAR). Information from the following report(s) SBAR was reviewed with the receiving nurse. Opportunity for questions and clarification was provided.       Patient transported with:   Registered Nurse

## 2023-01-29 NOTE — DISCHARGE SUMMARY
DISCHARGE SUMMARY    Some parts of the discharge summary are from the initial Psychiatric interview that was done on admission by the admitting psychiatrist.     Date of Admission: 1/28/2023    Date of Discharge: 1/29/2023     TYPE OF DISCHARGE:   AMA     ADMISSION EVALUATION:  CHIEF COMPLAINT: \"Of course I'm having suicidal thoughts. \"      HISTORY OF PRESENTING COMPLAINT:  Afsaneh Kaplan is a 61 y.o. BLACK/ female who is currently admitted to the psychiatric floor at [address] on a voluntary basis for suicidal ideation in the context of opioid withdrawal. Pt was agitated during assessment, yelling and screaming and writhing on the bed due to withdrawing from heroin. She states the last use was the day before yesterday by insufflation. She has also been using cocaine and benzos. PAST PSYCHIATRIC HISTORY: Pt has a hx of mood disorder, polysubstance use disorder. It has been noted in her chart a hx of malingering during past psychiatric hospitalizations. She has a hx of multiple past psychiatric hospitalizations, most recently in 2012 after a suicide attempt by overdose on heroin. SUBSTANCE ABUSE HISTORY: UDS+ for cocaine,opiates and benzos. Pt has a hx of heroin and cocaine abuse, though she was in recovery for a year prior to relapsing. She is followed by [facility] for suboxone treatment. PSYCHOSOCIAL HISTORY: Pt is . She lives with her . She has 4 adult children. No pending legal issues. She is on disability. COURSE IN THE HOSPITAL:  Patient was admitted to the inpatient psychiatry unit for acute psychiatric stabilization in regards to symptomatology as described in the HPI above and placed on Q15 minute checks and withdrawal precautions. Afsaneh Kaplan was discharged AMA at her request. Patient was considered competent to make this decision and did not appear to be a danger to themselves or to others.  I discussed the risks of withdrawal with Mathieu Villarreal including seizues, and possible death. Patient verbalized understanding and still want's to leave. There was no behavior indicating instability of their psychiatric symptoms and the patient appears to be able to make a reasonable judgment regarding their own care, manipulate this information, and indicate an appropriate choice. Antonio Portillo was discharged at their request with follow up to be arranged. No prescriptions were provided at the time of discharge. Upon discharge assessment, pt denies SI/plan/intent, HI/plan/intent, and AVH. She wishes to discharge because of her discomfort and she wishes to resume her home medications. She states she was \"joking\" about suicidal ideation upon admission, and has not had a suicide attempt in over 10 years. She did not do anything to harm herself prior to admission. She has a hx of malingering per chart review. She reports she has a follow up appointment at [facility] on Wednesday. She lives with her . She agrees to seek urgent psychiatric evaluation if she begins to have thoughts of harming self after discharge. Patient is fully aware of the risks and benefits of staying in the hosptal for completion of treatment tvs. leaving the hospital AMA. Patient had expressed a desire to follow up with appointments and remains motivated to be in treatment after discharge. The patient verbalized understanding of discharge instructions. DISCHARGE DIAGNOSIS:  Mood disorder NOS, r/o bipolar disorder; opioid use disorder; cocaine use disorder. Current Discharge Medication List        START taking these medications    Details   cephALEXin (KEFLEX) 500 mg capsule Take 1 Capsule by mouth three (3) times daily for 20 doses.  Indications: infection  Qty: 1 Capsule, Refills: 0  Start date: 1/29/2023, End date: 2/5/2023      cloNIDine HCL (CATAPRES) 0.1 mg tablet Take 1 Tablet by mouth every six (6) hours as needed for Opioid Withdrawal. Indications: symptoms from stopping treatment with opioid drugs  Qty: 1 Tablet, Refills: 0  Start date: 1/29/2023      methocarbamoL (ROBAXIN) 500 mg tablet Take 1 Tablet by mouth four (4) times daily as needed for Muscle Spasm(s). Indications: muscle spasm  Qty: 1 Tablet, Refills: 0  Start date: 1/29/2023      sertraline (ZOLOFT) 100 mg tablet Take 2 Tablets by mouth daily. Indications: anxiousness associated with depression  Qty: 1 Tablet, Refills: 0  Start date: 1/30/2023              Follow-up Information       Follow up With Specialties Details Why Contact Info    Omid Fraser MD Internal Medicine Physician   [address] [place] [address] Ave [phone]            WOUND CARE: none needed. MENTAL STATUS EXAM:  General:  Lyla Victor is a 61 y.o. BLACK/ female who is moderately groomed. Makes poor eye contact. Psychomotor activity is restless. Speech: normal in volume, tone, output and rhythm   Mood: describes mood as \"not good\"   Affect: congruent with mood  Perception: No perceptual abnormalities elicited. Thought process: logical and goal directed, linear   Thought content: denies SI/plan/intent, denies HI/plan/intent  Alert, awake and oriented X 4  Insight: poor  Judgment: poor      PROGNOSIS:   Fair / Guarded based on nature of patient's pathology/ies and treatment compliance issues. Prognosis is greatly dependent upon patient's ability to  follow up on psychiatric/psychotherapy appointments as well as to comply with psychiatric medications as prescribed which the individual has declined to do.

## 2023-01-29 NOTE — PROGRESS NOTES
[facility] 60  Master Treatment Plan for Michelle Lindsay    Date Treatment Plan Initiated: 1/29/23    Treatment Plan Modalities:  Type of Modality Amount  (x minutes) Frequency (x/week) Duration (x days) Name of Responsible Staff   Community & wrap-up meetings to encourage peer interactions 15 7 1 Noah Crow psychotherapy to assist in building coping skills and internal controls 60 7 1 Reena Rahman MSW   Therapeutic activity groups to build coping skills 60 7 1 Reena Rahman MSW   Psychoeducation in group setting to address:   Medication education   [address] [facility] [employer]   [place]   Relaxation techniques         Symptom management         Discharge planning   60 2 [address] [facility], [place]   Spirituality    60 2 1 Chaplain BOUCHER   61 1 1 [facility]/AA/NA      [facility] [place] [id number]   Physician medication management   15 7 1 Dr. Santi Gabriel meeting/discharge planning   15 2 1 Rosanna Smith                                   Problem: Falls - Risk of  Goal: *Absence of Falls  Description: Document Pili Fall Risk and appropriate interventions in the flowsheet. Outcome: Progressing Towards Goal  Note: Fall Risk Interventions:       Problem: Patient Education: Go to Patient Education Activity  Goal: Patient/Family Education  Outcome: Progressing Towards Goal     Problem: Chemical Dependency (Adult/Pediatric)  Goal: *STG: Participates in treatment plan  Outcome: Progressing Towards Goal  Note: Pt seen by NP in her room. Pt agitated and complaining of withdrawal symptoms. Taking off clothing in room. Needs frequent redirection from staff. Ate breakfast during the shift.    Goal: *STG: Remains safe in hospital  Outcome: Progressing Towards Goal  Goal: *STG: Seeks staff when symptoms of withdrawal increase  Outcome: Progressing Towards Goal  Goal: *STG: Attends activities and groups  Outcome: Progressing Towards Goal  Goal: *STG: Agrees to participate in outpatient after care program to support ongoing mental health  Outcome: Progressing Towards Goal  Goal: Interventions  Outcome: Progressing Towards Goal     Problem: Patient Education: Go to Patient Education Activity  Goal: Patient/Family Education  Outcome: Progressing Towards Goal
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
## 2023-01-29 NOTE — PROGRESS NOTES
01/29/23 1017   Pain Screen   Pain Scale 1 Numeric (0 - 10)   Pain Intensity 1 9   Pain Onset 1 immediate   Pain Location 1 Generalized   Pain Orientation 1 Lower   Pain Description 1 Aching   Pain Intervention(s) 1 Medication (see MAR)   Patient Stated Pain Goal 0   PRN tylenol administered per pt's request.       10:17: PRN Medication Documentation  Specific patient behavior that led to need for PRN medication: Pt requested medication for c/o \"anxiety. \"   Staff interventions attempted prior to PRN being given: listening, encouragement, education   PRN medication given: atarax     10:59: PRN Medication Documentation  Specific patient behavior that led to need for PRN medication: Pt requested medication for c/o opioid withdrawal.    PRN medication given: clonidine     12:35: PRN Medication Documentation  Specific patient behavior that led to need for PRN medication: Pt requested medication for c/o agitation. Pt is screaming in her room, \"HELP ME.\" When staff offers encouragement pt continues to scream at staff refusing help stating, \"I WANT METHADONE. \"   Staff interventions attempted prior to PRN being given: listening, distraction, encouragement, education   PRN medication given: zyprexa     12:36: PRN Medication Documentation  Specific patient behavior that led to need for PRN medication: Pt requested medication for c/o muscle spasms.    PRN medication given: robaxin

## 2023-01-29 NOTE — BH NOTES
TRANSFER - IN REPORT:    Verbal report received from Patricia Luna RN(name) on Ye Davison  being received from [facility] ED(unit) for routine progression of care      Report consisted of patients Situation, Background, Assessment and   Recommendations(SBAR). Information from the following report(s) SBAR, ED Summary, MAR, and Recent Results was reviewed with the receiving nurse. Opportunity for questions and clarification was provided. Assessment completed upon patients arrival to unit and care assumed.

## 2023-01-29 NOTE — ED NOTES
TRANSFER - OUT REPORT:    Verbal report given to Cem Granados RN(name) on Georgina Roa  being transferred to [facility] room 740-2 @ [facility] (unit) for routine progression of care       Report consisted of patients Situation, Background, Assessment and   Recommendations(SBAR). Information from the following report(s) SBAR, Kardex, ED Summary, [facility] and Recent Results was reviewed with the receiving nurse. Lines:       Opportunity for questions and clarification was provided.       Patient transported with: RAA and patient belongings

## 2023-01-29 NOTE — H&P
[facility] Adult  Hospitalist Group  History and Physical    Date of Service:  1/29/2023  Primary Care Provider: Regine Todd MD  Source of information: The patient and Chart review    Chief Complaint: No chief complaint on file. History of Presenting Illness:   Antonieta Olmedo is a 61 y.o. female with a past medical history significant for schizoaffective disorder, hypertension, substance use disorder. On 1/28 who presented to the emergency department at outside facility with complaints of chronic pain, depression, SI.  I did review the ED provider notes, patient admitted to use of heroin 1/27. Further work-up in the emergency department noted urinalysis indicative of urinary tract infection. She had no leukocytosis. Patient was evaluated by behavioral health, transferred to inpatient psychiatry at our facility. Psychiatry provider had consulted hospitalist service for medical clearance and evaluation. At the moment of the initial interview she was cooperative with the exam.  She endorsed that she had been having trouble taking her Suboxone due to vomiting, was feeling better, eating breakfast at the moment of the encounter. She endorsed that she had had fevers, no chills. REVIEW OF SYSTEMS:  A comprehensive review of systems was negative except for that written in the History of Present Illness.      Past Medical History:   Diagnosis Date    Acid reflux     Arthritis     KNEE, BACK    COPD     Depression     Other ill-defined conditions(799.89)     Chronic Back Pain    Psychiatric disorder     bipolar      Past Surgical History:   Procedure Laterality Date    COLONOSCOPY N/A 11/19/2019    COLONOSCOPY performed by Crista Harvey MD at [address] [place] [address] OR    HX CHOLECYSTECTOMY      HX GYN      oophorectomy, left    HX ORTHOPAEDIC Left CHILD    Fracture repair/knee    HX ORTHOPAEDIC  TEEN    REPAIR FX RIGHT FOREARM    [place] UNLISTED PROCEDURE ABDOMEN PERITONEUM & OMENTUM Cholecystectomy     Prior to Admission medications    Not on File     Allergies   Allergen Reactions    Latex Rash    Sulfa (Sulfonamide Antibiotics) Rash      Family History   Problem Relation Age of Onset    Hypertension Mother     Diabetes Mother     Anesth Problems Neg Hx       Social History:  reports that she has been smoking cigarettes. She has a 23.00 pack-year smoking history. She has never used smokeless tobacco. She reports current alcohol use. She reports current drug use. Drug: Heroin. Social Determinants of Health     Tobacco Use: High Risk    Smoking Tobacco Use: Every Day    Smokeless Tobacco Use: Never    Passive Exposure: Not on file   Alcohol Use: Not on file   Financial Resource Strain: Not on file   Food Insecurity: Not on file   Transportation Needs: Not on file   Physical Activity: Not on file   Stress: Not on file   Social Connections: Not on file   Intimate Partner Violence: Not on file   Depression: Not on file   Housing Stability: Not on file        Medications were reconciled to the best of my ability given all available resources at the time of admission. Route is PO if not otherwise noted. Family and social history were personally reviewed, all pertinent and relevant details are outlined as above.     Objective:   Visit Vitals  /66 (BP 1 Location: Left upper arm, BP Patient Position: Sitting)   Pulse 60   Temp 99.3 °F (37.4 °C)   Resp 14   LMP 08/31/2011   SpO2 99%           PHYSICAL EXAM:   General: Alert x oriented x 3, awake, no acute distress,   HEENT: PEERL, EOMI, moist mucus membranes  Neck: Supple, no JVD,   Chest: Clear to auscultation bilaterally   CVS: RRR, S1 S2 heard, no murmurs/rubs/gallops  Abd: Soft, non-tender, non-distended, +bowel sounds   Ext: No clubbing, no cyanosis, no edema  Neuro/Psych: Flat affect  Cap refill: Brisk, less than 3 seconds  Pulses: 2+, symmetric in all extremities  Skin: Warm, dry, without rashes or lesions    Data Review:   I have independently reviewed and interpreted patient's lab and all other diagnostic data    Abnormal Labs Reviewed - No data to display    All Micro Results       None            IMAGING:   No orders to display        ECG/ECHO:    Results for orders placed or performed during the hospital encounter of 01/28/23   EKG, 12 LEAD, INITIAL   Result Value Ref Range    Ventricular Rate 68 BPM    Atrial Rate 68 BPM    P-R Interval 122 ms    QRS Duration 94 ms    Q-T Interval 358 ms    QTC Calculation (Bezet) 380 ms    Calculated P Axis 40 degrees    Calculated R Axis 63 degrees    Calculated T Axis 2 degrees    Diagnosis       Normal sinus rhythm with sinus arrhythmia  Normal ECG  When compared with ECG of 10-GARY-2023 10:29,  ST no longer depressed in Inferior leads  Inverted T waves have replaced nonspecific T wave abnormality in Inferior   leads            Notes reviewed from all clinical/nonclinical/nursing services involved in patient's clinical care. Care coordination discussions were held with appropriate clinical/nonclinical/ nursing providers based on care coordination needs. Assessment:   Given the patient's current clinical presentation, there is a high level of concern for decompensation if discharged from the emergency department. Complex decision making was performed, which includes reviewing the patient's available past medical records, laboratory results, and imaging studies. Active Problems:    Bipolar disorder ([facility] [address] (1/28/2023)        Plan:     Bipolar DO  Management per primary team    Acute cystitis  Urinalysis indicative of urinary tract infection  Urine culture pending  Continue cephalexin    History of substance use disorder  Patient has been on Suboxone for some time. She tells me she takes this 3 times a day. I did review the , this works out to 8 mg twice a day and 4 mg daily  I did discuss this with Pharm. D. as well as psychiatry provider  Regarding the timing of the dosages, Pharm. D. and psychiatry will manage this  I have ordered 8 mg twice a day, first dose now, timing of other doses to be determined    Tobacco use disorder  Patient smokes about half a pack of cigarettes per day  Counseled on importance of smoking cessation  Offered nicotine patch however declined  She did agree to nicotine lozenges as needed which I have ordered        The rest per primary team  Thank you for the opportunity to participate in the care of this patient. Hospitalist service will sign off, please call with any questions      Signed By: Aditya Guardado NP     January 29, 2023         Please note that this dictation may have been completed with Janae Felix, the computer voice recognition software. Quite often unanticipated grammatical, syntax, homophones, and other interpretive errors are inadvertently transcribed by the computer software. Please disregard these errors. Please excuse any errors that have escaped final proofreading.

## 2023-01-29 NOTE — PROGRESS NOTES
Physical Therapy:     Orders received, chart reviewed, discussed with RN in prep for PT eval. Pt is actively withdrawing and is not appropriate for therapy at this time. Additionally, PT orders are for \"assessing need for RW\" however RN reports pt has RW present and has been using it on the unit for mobility. No PT needs indicated at this time. Please re-consult if pt has an acute change in functional mobility.      Coby Goodpasture, PT, DPT

## 2023-01-29 NOTE — BH NOTES
PSYCHOSOCIAL ASSESSMENT  :Patient identifying info:   Antonieta Olmedo is a 61 y.o., female admitted 1/28/2023 11:42 PM     Presenting problem and precipitating factors: Pt reported to [facility] ED due to increased physical pain and SI w/o plan. Pt reports trying to reach out to [place] Alfaror but told her DrBridget Was unavailable. Mental status assessment:     Strengths/Recreation/Coping Skills:    Collateral information: Eleuterio Agosto, spouse [phone]    Current psychiatric /substance abuse providers and contact info: Shayan Yoo [phone]/ cell [phone]    Previous psychiatric/substance abuse providers and response to treatment: Pt reports psych hospitalization 10 years ago    Family history of mental illness or substance abuse: unknown    Substance abuse history: Cocaine- last use 1/27/23; Heroin- last use 1/27/23 relapse after 1 year clean. Social History     Tobacco Use    Smoking status: Every Day     Packs/day: 0.50     Years: 46.00     Pack years: 23.00     Types: Cigarettes    Smokeless tobacco: Never   Substance Use Topics    Alcohol use: Yes     Comment: 2 DRINKS PER MONTH       History of biomedical complications associated with substance abuse: tremors, chills, sweats, body aches, sleep disturbance. Patient's current acceptance of treatment or motivation for change: voluntary    Family constellation: , adult children     Is significant other involved?  yes    Describe support system: family     Describe living arrangements and home environment: Pt lives with spouse     GUARDIAN/POA: NO    Guardian Name:     Guardian Contact:     Health issues:   Hospital Problems  Date Reviewed: 8/22/2018            Codes Class Noted POA    Bipolar disorder [facility]) ICD-10-CM: F31.9  ICD-9-CM: 296.80  1/28/2023 Unknown           Trauma history: yes    Legal issues: no     History of  service: no     Financial status: unknown    Evangelical/cultural factors: not reported Education/work history: College; disability     Have you been licensed as a health care professional (current or ): no     Describe coping skills: rosettaectual     Jacob Morton  2023

## 2023-01-29 NOTE — H&P
[facility]  INITIAL PSYCHIATRIC INTERVIEW:    Name: Ye Davison  MR#: [id number]  ACCOUNT#: [de-identified]  : 1965  ADMIT DATE: 2023    CHIEF COMPLAINT: \"Of course I'm having suicidal thoughts. \"     HISTORY OF PRESENTING COMPLAINT:  Ye Davison is a 61 y.o. BLACK/ female who is currently admitted to the psychiatric floor at [facility] on a voluntary basis for suicidal ideation in the context of opioid withdrawal. Pt was agitated during assessment, yelling and screaming and writhing on the bed due to withdrawing from heroin. She states the last use was the day before yesterday by insufflation. She has also been using cocaine and benzos. PAST PSYCHIATRIC HISTORY: Pt has a hx of mood disorder, polysubstance use disorder. It has been noted in her chart a hx of malingering during past psychiatric hospitalizations. She has a hx of multiple past psychiatric hospitalizations, most recently in  after a suicide attempt by overdose on heroin. SUBSTANCE ABUSE HISTORY: UDS+ for cocaine,opiates and benzos. Pt has a hx of heroin and cocaine abuse, though she was in recovery for a year prior to relapsing. She is followed by [facility] for suboxone treatment. PSYCHOSOCIAL HISTORY: Pt is . She lives with her . She has 4 adult children. No pending legal issues. She is on disability. PAST MEDICAL HISTORY: Reviewed per H&P. ALLERGIES: Sulfa, latex    MENTAL STATUS EXAM: The patient was noted to be a, 61 y.o. BLACK/ female who is in poor grooming, in a hospital gown, writhing on the bed. The patient was notably dysphoric, moaning and shouting in pain. She endorsed thoughts of harming self. She was not able to respond to further assessment questions. There was no evidence of any carrillo or hypomania or any symptoms of psychosis, such as hallucinations, delusional thinking, etc. Thought process was linear. Cognitively, the patient showed no signs of any deficits in memory, word-finding, processing speed, or executive functioning. Judgment and insight are noted to be poor. DIAGNOSTIC IMPRESSION: Mood disorder NOS, r/o bipolar disorder; opioid use disorder; cocaine use disorder. PLAN:   Continue inpatient stay for the safety and optimum care of the patient. Routine labs to be ordered as needed. Psychotropic medications will be ordered and adjusted as needed. Pt placed on opioid detox protocol. UDS+ for benzos, but no record of scripts in the , no pharmacy record of prescriptions, and pt denies taking benzos on the streets or others' prescriptions,   Supportive, milieu and group therapy. Estimated length of stay is 5-7 days, dependent upon pt's participation in treatment, response to pharmacological and non-pharmacological interventions, and follow-up plan including outpatient providers and safe environment for discharge. I certify that this patient's inpatient psychiatric hospital admission is medically necessary for treatment which could reasonably be expected to improve this patient's condition. The inpatient psychiatric services are provided while the individual is under the care of a psychiatric provider and are included in the individualized plan of care.

## 2023-01-29 NOTE — BH NOTES
1310- pt transferred to room 724/ pt is ambulating with a walker. TRANSFER - IN REPORT:    Verbal report received from [facility] [place] RN(name) on [facility]  being received from [facility] (unit) for urgent transfer      Report consisted of patients Situation, Background, Assessment and   Recommendations(SBAR). Information from the following report(s) SBAR was reviewed with the receiving nurse. Opportunity for questions and clarification was provided. Assessment completed upon patients arrival to unit and care assumed.

## 2023-01-29 NOTE — BH NOTES
Pt is requesting to leave. Pt is made aware the provider will come speak with her. Pt is denying SI.   1602- pt is using the phone calling [facility] to request transportation for discharge. AMA discharge is pending pt's ability to coordinate transportation. Pt is unable to coordinate transportation. Pt return to her room and refuses medications.

## 2023-01-29 NOTE — ED NOTES
GUILLAUME has arrived to transport patient to [address] and required documentation provided to transport team. [facility] called and updated on ETA. Any questions or concerns have been addressed at this time.

## 2023-01-30 VITALS
DIASTOLIC BLOOD PRESSURE: 80 MMHG | TEMPERATURE: 99 F | HEART RATE: 59 BPM | OXYGEN SATURATION: 98 % | SYSTOLIC BLOOD PRESSURE: 127 MMHG | RESPIRATION RATE: 16 BRPM

## 2023-01-30 LAB
ATRIAL RATE: 68 BPM
BACTERIA SPEC CULT: ABNORMAL
CALCULATED P AXIS, ECG09: 40 DEGREES
CALCULATED R AXIS, ECG10: 63 DEGREES
CALCULATED T AXIS, ECG11: 2 DEGREES
CC UR VC: ABNORMAL
DIAGNOSIS, 93000: NORMAL
P-R INTERVAL, ECG05: 122 MS
Q-T INTERVAL, ECG07: 358 MS
QRS DURATION, ECG06: 94 MS
QTC CALCULATION (BEZET), ECG08: 380 MS
SERVICE CMNT-IMP: ABNORMAL
VENTRICULAR RATE, ECG03: 68 BPM

## 2023-01-30 PROCEDURE — 74011250637 HC RX REV CODE- 250/637: Performed by: NURSE PRACTITIONER

## 2023-01-30 RX ADMIN — CEPHALEXIN 500 MG: 500 CAPSULE ORAL at 10:06

## 2023-01-30 RX ADMIN — SERTRALINE 200 MG: 50 TABLET, FILM COATED ORAL at 10:07

## 2023-01-30 RX ADMIN — ACETAMINOPHEN 650 MG: 325 TABLET ORAL at 10:06

## 2023-01-30 RX ADMIN — LORAZEPAM 0.5 MG: 0.5 TABLET ORAL at 10:07

## 2023-01-30 NOTE — PROGRESS NOTES
Problem: Discharge Planning  Goal: *Knowledge of medication management  Outcome: Progressing Towards Goal  Goal: *Knowledge of discharge instructions  Outcome: Progressing Towards Goal

## 2023-01-30 NOTE — PROGRESS NOTES
Problem: Chemical Dependency (Adult/Pediatric)  Goal: *STG: Participates in treatment plan  Outcome: Progressing Towards Goal  WI  Goal: *STG: Remains safe in hospital  Outcome: Progressing Towards Goal  Pt denies any suicidal or homicidal thoughts. Contracts for safety. Remains on q 15 min safety checks. Goal: *STG: Seeks staff when symptoms of withdrawal increase  Outcome: Progressing Towards Goal  WITHDRAWING FROM HEROIN AND BENZO'S. refused suboxone this am

## 2023-01-30 NOTE — PROGRESS NOTES
DISCHARGE SUMMARY from Nurse    PATIENT INSTRUCTIONS:    What to do at Home:  Recommended activity: Activity as tolerated,   *  Please give a list of your current medications to your Primary Care Provider. *  Please update this list whenever your medications are discontinued, doses are      changed, or new medications (including over-the-counter products) are added. *  Please carry medication information at all times in case of emergency situations. These are general instructions for a healthy lifestyle:    No smoking/ No tobacco products/ Avoid exposure to second hand smoke  Surgeon General's Warning:  Quitting smoking now greatly reduces serious risk to your health. Obesity, smoking, and sedentary lifestyle greatly increases your risk for illness    A healthy diet, regular physical exercise & weight monitoring are important for maintaining a healthy lifestyle    You may be retaining fluid if you have a history of heart failure or if you experience any of the following symptoms:  Weight gain of 3 pounds or more overnight or 5 pounds in a week, increased swelling in our hands or feet or shortness of breath while lying flat in bed. Please call your doctor as soon as you notice any of these symptoms; do not wait until your next office visit. The discharge information has been reviewed with the patient. The patient verbalized understanding. Discharge medications reviewed with the patient and appropriate educational materials and side effects teaching were provided. awaiting transportation Medicaid at approx 1108. Patient received all valuables.   ___________________________________________________________________________________________________________________________________  Problem: Discharge Planning  Goal: *Discharge to safe environment  Outcome: Resolved/Met  Plan to return home to [place]  Goal: *Knowledge of medication management  1/30/2023 1022 by Bonita Wong RN  Outcome: Resolved/Met  Verbalizes understanding     Goal: *Knowledge of discharge instructions  1/30/2023 1022 by Amy Hyatt RN  Outcome: Resolved/Met  Verbalizes understanding

## 2023-01-30 NOTE — DISCHARGE INSTRUCTIONS
DISCHARGE SUMMARY    NAME:Susan Villarreal  : 1965  MRN: [id number]    The patient Alexander Ramirez exhibits the ability to control behavior in a less restrictive environment. Patient's level of functioning is improving. No assaultive/destructive behavior has been observed for the past 24 hours. No suicidal/homicidal threat or behavior has been observed for the past 24 hours. There is no evidence of serious medication side effects. Patient has not been in physical or protective restraints for at least the past 24 hours. If weapons involved, how are they secured? No weapons involved     Is patient aware of and in agreement with discharge plan? She requested a AMA discharge    Arrangements for medication:  no prescriptions given she left AMA     Copy of discharge instructions to provider?:  yes  fax to [facility]     Arrangements for transportation home:  medicaid taxi     Keep all follow up appointments as scheduled, continue to take prescribed medications per physician instructions.   Mental health crisis number:  528 or your local mental health crisis line number at [address] [facility]: [phone]    [place] [address] Emergency WARM LINE      [phone] ([phone])      M-F: 9am to 9pm      Sat & Sun: 5pm - 9pm  National suicide prevention lines:                             6-014-ZDRQPXT ([phone])       4-818-092-TALK ([phone])    Crisis Text Line:  Text HOME to 500947

## 2023-01-30 NOTE — BH NOTES
GROUP THERAPY PROGRESS NOTE    Patient did not participate in psychotherapy group.     Pedro Casanova MSW, HP-A

## 2023-01-30 NOTE — BH NOTES
Behavioral Health Transition Record to Provider    Patient Name: Kenisha Catherine  YOB: 1965  Medical Record Number: [id number]  Date of Admission: 1/28/2023  Date of Discharge: 1/30/23    Attending Provider: Calista Cardona MD  Discharging Provider: Marino Naqvi NP   To contact this individual call [phone] and ask the  to page. If unavailable, ask to be transferred to [address] [place] [address] Provider on call. [facility] Provider will be available on call 24/7 and during holidays. Primary Care Provider: Hung Montemayor MD    Allergies   Allergen Reactions    Latex Rash    Sulfa (Sulfonamide Antibiotics) Rash       Reason for Admission:   CHIEF COMPLAINT: \"Of course I'm having suicidal thoughts. \"      HISTORY OF PRESENTING COMPLAINT:  Kenisha Catherine is a 61 y.o. BLACK/ female who is currently admitted to the psychiatric floor at [facility] on a voluntary basis for suicidal ideation in the context of opioid withdrawal. Pt was agitated during assessment, yelling and screaming and writhing on the bed due to withdrawing from heroin. She states the last use was the day before yesterday by insufflation. She has also been using cocaine and benzos.         Ad[facility]: [facility] [F31.9]    * No surgery found *    Results for orders placed or performed during the hospital encounter of 01/28/23   CULTURE, URINE    Specimen: Urine   Result Value Ref Range    Special Requests: NO SPECIAL REQUESTS  Reflexed from [id number]        [place] Count >100,000  COLONIES/mL        Culture result: Gram negative rods (A)     COVID-19 WITH INFLUENZA A/B   Result Value Ref Range    SARS-CoV-2 by PCR Not detected NOTD      Influenza A by PCR Not detected      Influenza B by PCR Not detected     METABOLIC PANEL, BASIC   Result Value Ref Range    Sodium 141 136 - 145 mmol/L    Potassium 3.7 3.5 - 5.1 mmol/L    Chloride 103 97 - 108 mmol/L    CO2 33 (H) 21 - 32 mmol/L    Anion gap 5 5 - 15 mmol/L    Glucose 90 65 - 100 mg/dL    BUN 11 6 - 20 MG/DL    Creatinine 0.64 0.55 - 1.02 MG/DL    BUN/Creatinine ratio 17 12 - 20      eGFR >60 >60 ml/min/1.73m2    Calcium 8.7 8.5 - 10.1 MG/DL   URINALYSIS W/ REFLEX CULTURE    Specimen: Urine   Result Value Ref Range    Color YELLOW/STRAW      Appearance TURBID (A) CLEAR      Specific gravity 1.015      pH (UA) 5.5 5.0 - 8.0      Protein 30 (A) NEG mg/dL    Glucose Negative NEG mg/dL    Ketone TRACE (A) NEG mg/dL    Bilirubin Negative NEG      Blood LARGE (A) NEG      Urobilinogen 1.0 0.2 - 1.0 EU/dL    Nitrites Positive (A) NEG      Leukocyte Esterase LARGE (A) NEG      WBC 20-50 0 - 4 /hpf    RBC 0-5 0 - 5 /hpf    Epithelial cells MODERATE (A) FEW /lpf    Bacteria 4+ (A) NEG /hpf    UA:UC IF INDICATED URINE CULTURE ORDERED (A) CNI     CBC WITH AUTOMATED DIFF   Result Value Ref Range    WBC 8.9 3.6 - 11.0 K/uL    RBC 4.38 3.80 - 5.20 M/uL    HGB 13.4 11.5 - 16.0 g/dL    HCT 40.2 35.0 - 47.0 %    MCV 91.8 80.0 - 99.0 FL    MCH 30.6 26.0 - 34.0 PG    MCHC 33.3 30.0 - 36.5 g/dL    RDW 13.5 11.5 - 14.5 %    PLATELET 295 505 - 554 K/uL    MPV 10.9 8.9 - 12.9 FL    NRBC 0.0 0  WBC    ABSOLUTE NRBC 0.00 0.00 - 0.01 K/uL    NEUTROPHILS 73 32 - 75 %    LYMPHOCYTES 18 12 - 49 %    MONOCYTES 8 5 - 13 %    EOSINOPHILS 1 0 - 7 %    BASOPHILS 0 0 - 1 %    IMMATURE GRANULOCYTES 0 0.0 - 0.5 %    ABS. NEUTROPHILS 6.4 1.8 - 8.0 K/UL    ABS. LYMPHOCYTES 1.6 0.8 - 3.5 K/UL    ABS. MONOCYTES 0.7 0.0 - 1.0 K/UL    ABS. EOSINOPHILS 0.1 0.0 - 0.4 K/UL    ABS. BASOPHILS 0.0 0.0 - 0.1 K/UL    ABS. IMM.  GRANS. 0.0 0.00 - 0.04 K/UL    DF AUTOMATED     SED RATE (ESR)   Result Value Ref Range    Sed rate, automated 46 (H) 0 - 30 mm/hr   DRUG SCREEN, URINE   Result Value Ref Range    AMPHETAMINES Negative NEG      BARBITURATES Negative NEG      BENZODIAZEPINES Positive (A) NEG      COCAINE Positive (A) NEG      METHADONE Negative NEG      OPIATES Positive (A) NEG      PCP(PHENCYCLIDINE) Negative NEG      THC (TH-CANNABINOL) Negative NEG      Drug screen comment (NOTE)    ETHYL ALCOHOL   Result Value Ref Range    ALCOHOL(ETHYL),SERUM <10 <10 MG/DL   EKG, 12 LEAD, INITIAL   Result Value Ref Range    Ventricular Rate 68 BPM    Atrial Rate 68 BPM    P-R Interval 122 ms    QRS Duration 94 ms    Q-T Interval 358 ms    QTC Calculation (Bezet) 380 ms    Calculated P Axis 40 degrees    Calculated R Axis 63 degrees    Calculated T Axis 2 degrees    Diagnosis       Normal sinus rhythm with sinus arrhythmia  Normal ECG  When compared with ECG of 10-GARY-2023 10:29,  ST no longer depressed in Inferior leads  Inverted T waves have replaced nonspecific T wave abnormality in Inferior   leads         Immunizations administered during this encounter: There is no immunization history for the selected administration types on file for this patient. Screening for Metabolic Disorders for Patients on Antipsychotic Medications  (Data obtained from the EMR)    Estimated Body Mass Index  Estimated body mass index is 27.29 kg/m² as calculated from the following:    Height as of an earlier encounter on 1/28/23: 5' 5\" (1.651 m). Weight as of an earlier encounter on 1/28/23: 74.4 kg (164 lb).      Vital Signs/Blood Pressure  Visit Vitals  /80 (BP 1 Location: Left upper arm, BP Patient Position: Sitting)   Pulse (!) 59   Temp 99 °F (37.2 °C)   Resp 16   LMP 08/31/2011   SpO2 98%       Blood Glucose/Hemoglobin A1c  Lab Results   Component Value Date/Time    Glucose 90 01/28/2023 09:24 AM    Glucose 99 08/16/2012 07:15 AM    Glucose (POC) 98 01/10/2023 10:35 AM       No results found for: HBA1C, [id number], [id number]     Lipid Panel  Lab Results   Component Value Date/Time    Cholesterol, total 183 08/16/2012 07:15 AM    HDL Cholesterol 40 08/16/2012 07:15 AM    LDL, calculated 105 (H) 08/16/2012 07:15 AM    Triglyceride 190 (H) 08/16/2012 07:15 AM    CHOL/HDL Ratio 4.6 08/16/2012 07:15 AM        Discharge Diagnosis: Mood disorder NOS, r/o bipolar disorder; opioid use disorder; cocaine use disorder. Discharge Plan: She will be discharged AMA -         Discharge Medication List and Instructions:   Current Discharge Medication List        START taking these medications    Details   cephALEXin (KEFLEX) 500 mg capsule Take 1 Capsule by mouth three (3) times daily for 20 doses. Indications: infection  Qty: 1 Capsule, Refills: 0  Start date: 1/29/2023, End date: 2/5/2023      cloNIDine HCL (CATAPRES) 0.1 mg tablet Take 1 Tablet by mouth every six (6) hours as needed for Opioid Withdrawal. Indications: symptoms from stopping treatment with opioid drugs  Qty: 1 Tablet, Refills: 0  Start date: 1/29/2023      methocarbamoL (ROBAXIN) 500 mg tablet Take 1 Tablet by mouth four (4) times daily as needed for Muscle Spasm(s). Indications: muscle spasm  Qty: 1 Tablet, Refills: 0  Start date: 1/29/2023      sertraline (ZOLOFT) 100 mg tablet Take 2 Tablets by mouth daily. Indications: anxiousness associated with depression  Qty: 1 Tablet, Refills: 0  Start date: 1/30/2023             Unresulted Labs (24h ago, onward)      None          To obtain results of studies pending at discharge, please contact [phone]    Follow-up Information       Follow up With Specialties Details Why Contact Mack Paredes MD Internal Medicine Physician   [address] [place] [phone] [address] [facility] [facility]  Go to  [address] [place] [address], [facility], [facility]     [phone]  fax [phone]            Advanced Directive:   Does the patient have an appointed surrogate decision maker? No  Does the patient have a Medical Advance Directive? No  Does the patient have a Psychiatric Advance Directive?  No  If the patient does not have a surrogate or Medical Advance Directive AND Psychiatric Advance Directive, the patient was offered information on these advance directives Patient declined to complete    Patient Instructions: Please continue all medications until otherwise directed by physician. Tobacco Cessation Discharge Plan:   Is the patient a smoker and needs referral for smoking cessation? Yes  Patient referred to the following for smoking cessation with an appointment? Refused     Patient was offered medication to assist with smoking cessation at discharge? Refused  Was education for smoking cessation added to the discharge instructions? Yes    Alcohol/Substance Abuse Discharge Plan:   Does the patient have a history of substance/alcohol abuse and requires a referral for treatment? Yes  Patient referred to the following for substance/alcohol abuse treatment with an appointment? Refused  Patient was offered medication to assist with alcohol cessation at discharge? Refused  Was education for substance/alcohol abuse added to discharge instructions? Refused    Patient discharged to Home; discussed with patient/caregiver and provided to the patient/caregiver either in hard copy or electronically.

## 2023-01-30 NOTE — BH NOTES
PSYCHIATRIC PROGRESS NOTE    Patient: Todd Dinh MRN: [id number]  SSN: [id number]    YOB: 1965  Age: 61 y.o. Sex: female      Admit Date: 1/28/2023    Length of Stay: 2 Days    Chief Complaint:\"I'm OK\"    Interval History:   Checked in with pt prior to [facility] discharge. Pt denies SI/plan/intent, denies HI/plan/intent, denies AVH, no evidence of any psychotic processes observed. No complaints reported with eating or sleeping. Todd Dinh has been compliant with medications and finds them beneficial. Denies adverse effects. Denies other changes or new concerns or questions at this time. She will discharge home with [facility] follow [facility]. This is an [facility] discharge, but she is not at immediate risk of harm to herself or others. Past Medical History:  Past Medical History:   Diagnosis Date    Acid reflux     Arthritis     KNEE, BACK    COPD     Depression     Other ill-defined conditions(049.89)     Chronic Back Pain    Psychiatric disorder     bipolar         Labs:  Lab Results   Component Value Date/Time    WBC 8.9 01/28/2023 10:27 AM    Hemoglobin (POC) 14.3 08/15/2012 02:02 PM    HGB 13.4 01/28/2023 10:27 AM    Hematocrit (POC) 42 08/15/2012 02:02 PM    HCT 40.2 01/28/2023 10:27 AM    PLATELET 620 67/87/5404 10:27 AM    MCV 91.8 01/28/2023 10:27 AM      Lab Results   Component Value Date/Time    Sodium 141 01/28/2023 09:24 AM    Potassium 3.7 01/28/2023 09:24 AM    Chloride 103 01/28/2023 09:24 AM    CO2 33 (H) 01/28/2023 09:24 AM    Anion gap 5 01/28/2023 09:24 AM    Glucose 90 01/28/2023 09:24 AM    Glucose 99 08/16/2012 07:15 AM    BUN 11 01/28/2023 09:24 AM    Creatinine 0.64 01/28/2023 09:24 AM    BUN/Creatinine ratio 17 01/28/2023 09:24 AM    GFR est AA >60 10/22/2017 06:55 AM    GFR est non-AA >60 10/22/2017 06:55 AM    Calcium 8.7 01/28/2023 09:24 AM    Bilirubin, total 0.3 01/10/2023 10:37 AM    Alk.  phosphatase 72 01/10/2023 10:37 AM    Protein, total 7.3 01/10/2023 10:37 AM Albumin 3.6 01/10/2023 10:37 AM    Globulin 3.7 01/10/2023 10:37 AM    A-G Ratio 1.0 (L) 01/10/2023 10:37 AM    ALT (SGPT) 34 01/10/2023 10:37 AM      Vitals:    01/29/23 0757 01/29/23 1044 01/29/23 1643 01/30/23 0823   BP: 117/66 130/63 103/63 127/80   Pulse: 60 75 76 (!) 59   Resp: 14  18 16   Temp: 99.3 °F (37.4 °C)  98.1 °F (36.7 °C) 99 °F (37.2 °C)   SpO2: 99%  99% 98%   LMP: 08/31/2011       Current Facility-Administered Medications   Medication Dose Route Frequency Provider Last Rate Last Admin    OLANZapine (ZyPREXA) tablet 5 mg  5 mg Oral Q6H PRN Mynor Pink' V, FNP   5 mg at 01/29/23 1235    haloperidol lactate (HALDOL) injection 5 mg  5 mg IntraMUSCular Q6H PRN Mynor Pink' V, FNP        benztropine (COGENTIN) tablet 1 mg  1 mg Oral BID PRN Mynor Pink' V, FNP        diphenhydrAMINE (BENADRYL) injection 50 mg  50 mg IntraMUSCular BID PRN Mynor Pink' V, FNP        hydrOXYzine HCL (ATARAX) tablet 50 mg  50 mg Oral TID PRN Mynor Pink' V, FNP   50 mg at 01/29/23 1017    LORazepam (ATIVAN) 2 mg/mL injection 1 mg  1 mg IntraMUSCular Q4H PRN Mynor Pink' V, FNP        traZODone (DESYREL) tablet 50 mg  50 mg Oral QHS PRN Mynor Pink' V, FNP   50 mg at 01/29/23 0147    acetaminophen (TYLENOL) tablet 650 mg  650 mg Oral Q4H PRN Mynor Pink' V, FNP   650 mg at 01/30/23 1006    magnesium hydroxide (MILK OF MAGNESIA) 400 mg/5 mL oral suspension 30 mL  30 mL Oral DAILY PRN Mynor Pink' V, FNP        cephALEXin (KEFLEX) capsule 500 mg  500 mg Oral TID Mynor Pink' V, FNP   500 mg at 01/30/23 1006    buprenorphine-naloxone (SUBOXONE) 8-2mg SL tablet  1 Tablet SubLINGual BID Levorlisa Sung NP   1 Tablet at 01/29/23 0900    nicotine buccal (POLACRILEX) lozenge 2 mg  2 mg Oral Q2H PRN Monroe Sung NP        cloNIDine HCL (CATAPRES) tablet 0.1 mg  0.1 mg Oral Q6H PRN Bjorn Stallworth NP   0.1 mg at 01/29/23 1059    methocarbamoL (ROBAXIN) tablet 500 mg  500 mg Oral QID PRN Niall Hebert S, NP   500 mg at 01/29/23 1236    loperamide (IMODIUM) capsule 2 mg  2 mg Oral Q4H PRN Felicity Zeng, NP        LORazepam (ATIVAN) tablet 0.5 mg  0.5 mg Oral BID Felicity Azucena, NP   0.5 mg at 01/30/23 1007    sertraline (ZOLOFT) tablet 200 mg  200 mg Oral DAILY Felicity Azucena, NP   200 mg at 01/30/23 1007     Current Outpatient Medications   Medication Sig Dispense Refill    cephALEXin (KEFLEX) 500 mg capsule Take 1 Capsule by mouth three (3) times daily for 20 doses. Indications: infection 1 Capsule 0    cloNIDine HCL (CATAPRES) 0.1 mg tablet Take 1 Tablet by mouth every six (6) hours as needed for Opioid Withdrawal. Indications: symptoms from stopping treatment with opioid drugs 1 Tablet 0    methocarbamoL (ROBAXIN) 500 mg tablet Take 1 Tablet by mouth four (4) times daily as needed for Muscle Spasm(s). Indications: muscle spasm 1 Tablet 0    sertraline (ZOLOFT) 100 mg tablet Take 2 Tablets by mouth daily. Indications: anxiousness associated with depression 1 Tablet 0       MENTAL STATUS EXAM:  General:  Harriet Galvin is a 61 y.o. BLACK/ female who is moderately groomed. Makes poor eye contact. Psychomotor activity is restless. Speech: normal in volume, tone, output and rhythm   Mood: describes mood as \"not good\"   Affect: congruent with mood  Perception: No perceptual abnormalities elicited. Thought process: logical and goal directed, linear   Thought content: denies SI/plan/intent, denies HI/plan/intent  Alert, awake and oriented X 4  Insight: poor  Judgment: poor      Physical Exam:  Body habitus: There is no height or weight on file to calculate BMI. Musculoskeletal system: normal gait  Tremor - neg  Cog wheeling - neg    Assessment and Plan:  Cassidy Villarreal meets criteria for a diagnosis of Mood disorder NOS, r/o bipolar disorder; opioid use disorder; cocaine use disorder. AMA discharge today.    A coordinated, multidisplinary treatment team round was conducted with the patient, nurses, pharmcist,  and writer present. Discussions held with , and/or with family members; Complete current electronic health record for patient was reviewed in full including consultant notes, ancillary staff notes, nurses and tech notes, labs and vitals. I certify that this patients inpatient psychiatric hospital services furnished since the previous certification were, and continue to be, required for treatment that could reasonably be expected to improve the patient's condition, or for diagnostic study, and that the patient continues to need, on a daily basis, active treatment furnished directly by or requiring the supervision of inpatient psychiatric facility personnel. In addition, the hospital records show that services furnished were intensive treatment services, admission or related services, or equivalent services.

## 2023-01-30 NOTE — INTERDISCIPLINARY ROUNDS
Behavioral Health Interdisciplinary Rounds     Patient Name: Imani Gutierrez  Age: 61 y.o. Room/Bed:  [facility]  Primary Diagnosis: <principal problem not specified>   Admission Status: Voluntary     Readmission within 30 days: no  Power of  in place: no  Patient requires a blocked bed: no          Reason for blocked bed:   Sleep hours: 7+       Participation in Care/Groups:    Medication Compliant?: Yes  PRNS (last 24 hours):  Antipsychotic (PO), Antianxiety, and Pain    Restraints (last 24 hours):  no  __________________________________________________  OQ Admission Analysis Survey completed:  OQ Admission Analysis Survey score:  OQ Discharge Analysis Survey completed:  OQ Discharge Analysis Survey score:   __________________________________________________     Alcohol screening (AUDIT) completed -  AUDIT Score: 0  If applicable, date SBIRT discussed in treatment team AND documented:    Tobacco - patient is a smoker: Have You Used Tobacco in the Past 30 Days: Yes  Illegal Drugs use: Have You Used Any Illegal Substances Over the Past 12 Months: Yes    24 hour chart check complete: no     _______________________________________________    Patient goal(s) for today:   Treatment team focus/goals:   Progress note:         Financial concerns/prescription coverage:    Family contact:                        Family requesting physician contact today:    Discharge plan:   Access to weapons :                                                              Outpatient provider(s):   Patient's preferred phone number for follow up call :   Patient's preferred e-mail address :  Participating treatment team members:    LOS:  2  Expected LOS:     Participating treatment team members: Matthew Villarreal, * (assigned SW),

## 2023-01-30 NOTE — PROGRESS NOTES
Problem: Falls - Risk of  Goal: *Absence of Falls  Description: Document Dennie Oak Fall Risk and appropriate interventions in the flowsheet. Outcome: Progressing Towards Goal  Note: Fall Risk Interventions:        Patient received, appears to be asleep, eyes closed, breathing even and unlabored. No signs of distress noted. Will continue to monitor for safety.

## 2023-03-19 ENCOUNTER — HOSPITAL ENCOUNTER (EMERGENCY)
Age: 58
Discharge: HOME | End: 2023-03-19
Attending: EMERGENCY MEDICINE
Payer: MEDICAID

## 2023-03-19 ENCOUNTER — APPOINTMENT (OUTPATIENT)
Dept: GENERAL RADIOLOGY | Age: 58
End: 2023-03-19
Attending: EMERGENCY MEDICINE
Payer: MEDICAID

## 2023-03-19 VITALS
TEMPERATURE: 97.7 F | HEART RATE: 60 BPM | OXYGEN SATURATION: 100 % | SYSTOLIC BLOOD PRESSURE: 139 MMHG | DIASTOLIC BLOOD PRESSURE: 69 MMHG | RESPIRATION RATE: 18 BRPM

## 2023-03-19 DIAGNOSIS — M54.16 LUMBAR BACK PAIN WITH RADICULOPATHY AFFECTING LEFT LOWER EXTREMITY: Primary | ICD-10-CM

## 2023-03-19 LAB
ALBUMIN SERPL-MCNC: 3.1 G/DL (ref 3.5–5)
ALBUMIN/GLOB SERPL: 0.9 (ref 1.1–2.2)
ALP SERPL-CCNC: 70 U/L (ref 45–117)
ALT SERPL-CCNC: 27 U/L (ref 12–78)
ANION GAP SERPL CALC-SCNC: 4 MMOL/L (ref 5–15)
AST SERPL W P-5'-P-CCNC: 19 U/L (ref 15–37)
BASOPHILS # BLD: 0 K/UL (ref 0–0.1)
BASOPHILS NFR BLD: 0 % (ref 0–1)
BILIRUB SERPL-MCNC: 0.2 MG/DL (ref 0.2–1)
BUN SERPL-MCNC: 18 MG/DL (ref 6–20)
BUN/CREAT SERPL: 28 (ref 12–20)
CA-I BLD-MCNC: 8.8 MG/DL (ref 8.5–10.1)
CHLORIDE SERPL-SCNC: 107 MMOL/L (ref 97–108)
CO2 SERPL-SCNC: 32 MMOL/L (ref 21–32)
CREAT SERPL-MCNC: 0.65 MG/DL (ref 0.55–1.02)
DIFFERENTIAL METHOD BLD: NORMAL
EOSINOPHIL # BLD: 0.3 K/UL (ref 0–0.4)
EOSINOPHIL NFR BLD: 4 % (ref 0–7)
ERYTHROCYTE [DISTWIDTH] IN BLOOD BY AUTOMATED COUNT: 13.5 % (ref 11.5–14.5)
GLOBULIN SER CALC-MCNC: 3.4 G/DL (ref 2–4)
GLUCOSE SERPL-MCNC: 102 MG/DL (ref 65–100)
HCT VFR BLD AUTO: 36.2 % (ref 35–47)
HGB BLD-MCNC: 12.2 G/DL (ref 11.5–16)
IMM GRANULOCYTES # BLD AUTO: 0 K/UL (ref 0–0.04)
IMM GRANULOCYTES NFR BLD AUTO: 0 % (ref 0–0.5)
LYMPHOCYTES # BLD: 3.1 K/UL (ref 0.8–3.5)
LYMPHOCYTES NFR BLD: 37 % (ref 12–49)
MCH RBC QN AUTO: 31.5 PG (ref 26–34)
MCHC RBC AUTO-ENTMCNC: 33.7 G/DL (ref 30–36.5)
MCV RBC AUTO: 93.5 FL (ref 80–99)
MONOCYTES # BLD: 0.7 K/UL (ref 0–1)
MONOCYTES NFR BLD: 8 % (ref 5–13)
NEUTS SEG # BLD: 4.3 K/UL (ref 1.8–8)
NEUTS SEG NFR BLD: 51 % (ref 32–75)
NRBC # BLD: 0 K/UL (ref 0–0.01)
NRBC BLD-RTO: 0 PER 100 WBC
PLATELET # BLD AUTO: 204 K/UL (ref 150–400)
PMV BLD AUTO: 11.5 FL (ref 8.9–12.9)
POTASSIUM SERPL-SCNC: 3.5 MMOL/L (ref 3.5–5.1)
PROT SERPL-MCNC: 6.5 G/DL (ref 6.4–8.2)
RBC # BLD AUTO: 3.87 M/UL (ref 3.8–5.2)
SODIUM SERPL-SCNC: 143 MMOL/L (ref 136–145)
WBC # BLD AUTO: 8.5 K/UL (ref 3.6–11)

## 2023-03-19 PROCEDURE — 36415 COLL VENOUS BLD VENIPUNCTURE: CPT

## 2023-03-19 PROCEDURE — 85025 COMPLETE CBC W/AUTO DIFF WBC: CPT

## 2023-03-19 PROCEDURE — 73502 X-RAY EXAM HIP UNI 2-3 VIEWS: CPT

## 2023-03-19 PROCEDURE — 96374 THER/PROPH/DIAG INJ IV PUSH: CPT

## 2023-03-19 PROCEDURE — 74011250636 HC RX REV CODE- 250/636: Performed by: EMERGENCY MEDICINE

## 2023-03-19 PROCEDURE — 74011250637 HC RX REV CODE- 250/637: Performed by: EMERGENCY MEDICINE

## 2023-03-19 PROCEDURE — 99284 EMERGENCY DEPT VISIT MOD MDM: CPT

## 2023-03-19 PROCEDURE — 80053 COMPREHEN METABOLIC PANEL: CPT

## 2023-03-19 RX ORDER — KETOROLAC TROMETHAMINE 30 MG/ML
30 INJECTION, SOLUTION INTRAMUSCULAR; INTRAVENOUS
Status: COMPLETED | OUTPATIENT
Start: 2023-03-19 | End: 2023-03-19

## 2023-03-19 RX ORDER — ACETAMINOPHEN 500 MG
1000 TABLET ORAL ONCE
Status: DISCONTINUED | OUTPATIENT
Start: 2023-03-19 | End: 2023-03-19 | Stop reason: HOSPADM

## 2023-03-19 RX ORDER — CYCLOBENZAPRINE HCL 10 MG
10 TABLET ORAL
Status: COMPLETED | OUTPATIENT
Start: 2023-03-19 | End: 2023-03-19

## 2023-03-19 RX ADMIN — KETOROLAC TROMETHAMINE 30 MG: 30 INJECTION, SOLUTION INTRAMUSCULAR; INTRAVENOUS at 11:16

## 2023-03-19 RX ADMIN — CYCLOBENZAPRINE 10 MG: 10 TABLET, FILM COATED ORAL at 11:16

## 2023-03-19 NOTE — ED TRIAGE NOTES
Pt reports x 2 falls last night. PT reports chronic leg pain. PT reports left leg pain and swelling that has worsened. PT reports she cannot walk or take care of herself at home.

## 2023-03-19 NOTE — ED NOTES
Pt requesting to  leave. IV removed per pts instructions. Pt reports she will go to another facility.  at bedside at this time.  reports he agrees with current POC of possible nursing home placement due to being disabled himself. PT reports she wants to leave it is taking too long to be admitted. Writer explained process for possible admittance.

## 2023-03-19 NOTE — ED NOTES
Pt  requesting transport to help get her home, pt is insisting on leaving now and not waiting on transport.  Pt became irate insisting she be taken out of the ED, pt placed in wheelchair and  pushed her to the door, pt placed outside with  in wheelchair pt  reported his sister was on the way to get them, security helped place pt in car with the assistance of two family members

## 2023-03-19 NOTE — ED PROVIDER NOTES
[facility] EMERGENCY DEPT  EMERGENCY DEPARTMENT HISTORY AND PHYSICAL EXAM      Date: 3/19/2023  Patient Name: Corrinne Loma  MRN: [id number]  [place]: 1965  Date of evaluation: 3/19/2023  Provider: Frances Brink MD   Note Started: 10:33 AM 3/19/23    HISTORY OF PRESENT ILLNESS     Chief Complaint   Patient presents with    Leg Pain       History Provided By: Patient    HPI: Corrinne Loma is a 61 y.o. female presents with complaint of leg pain which is chronic    PAST MEDICAL HISTORY   Past Medical History:  Past Medical History:   Diagnosis Date    Acid reflux     Arthritis     KNEE, BACK    COPD     Depression     Other ill-defined conditions(799.89)     Chronic Back Pain    Psychiatric disorder     bipolar       Past Surgical History:  Past Surgical History:   Procedure Laterality Date    COLONOSCOPY N/A 11/19/2019    COLONOSCOPY performed by Najma Junior MD at [facility] MAIN OR    HX CHOLECYSTECTOMY      HX GYN      oophorectomy, left    HX ORTHOPAEDIC Left CHILD    Fracture repair/knee    HX ORTHOPAEDIC  TEEN    REPAIR FX RIGHT FOREARM    KY UNLISTED PROCEDURE ABDOMEN PERITONEUM & OMENTUM      Cholecystectomy       Family History:  Family History   Problem Relation Age of Onset    Hypertension Mother     Diabetes Mother     Anesth Problems Neg Hx        Social History:  Social History     Tobacco Use    Smoking status: Every Day     Packs/day: 0.50     Years: 46.00     Pack years: 23.00     Types: Cigarettes    Smokeless tobacco: Never   Substance Use Topics    Alcohol use: Yes     Comment: 2 DRINKS PER MONTH    Drug use: Yes     Types: Heroin     Comment: PT. DENIES 8-15-18       Allergies:   Allergies   Allergen Reactions    Latex Rash    Sulfa (Sulfonamide Antibiotics) Rash       PCP: Jodeen Baumgarten, MD    Current Meds:   Previous Medications    CLONIDINE HCL (CATAPRES) 0.1 MG TABLET    Take 1 Tablet by mouth every six (6) hours as needed for Opioid Withdrawal. Indications: symptoms from stopping treatment with opioid drugs    METHOCARBAMOL (ROBAXIN) 500 MG TABLET    Take 1 Tablet by mouth four (4) times daily as needed for Muscle Spasm(s). Indications: muscle spasm    SERTRALINE (ZOLOFT) 100 MG TABLET    Take 2 Tablets by mouth daily. Indications: anxiousness associated with depression       PHYSICAL EXAM     ED Triage Vitals [03/19/23 1018]   ED Encounter Vitals Group      /69      Pulse (Heart Rate) 60      Resp Rate 18      Temp 97.7 °F (36.5 °C)      Temp src       O2 Sat (%) 100 %      Weight       Height       Physical Exam  Vitals and nursing note reviewed. Constitutional:       General: She is not in acute distress. Appearance: She is ill-appearing. She is not toxic-appearing or diaphoretic. HENT:      Head: Normocephalic and atraumatic. Eyes:      Extraocular Movements: Extraocular movements intact. Pupils: Pupils are equal, round, and reactive to light. Cardiovascular:      Rate and Rhythm: Normal rate and regular rhythm. Pulses: Normal pulses. Heart sounds: Normal heart sounds. Musculoskeletal:         General: No tenderness. Left lower leg: No tenderness or bony tenderness. Edema present. Skin:     General: Skin is warm and dry. Neurological:      Mental Status: She is alert. GCS: GCS eye subscore is 4. GCS verbal subscore is 5. GCS motor subscore is 6. Cranial Nerves: Cranial nerves 2-12 are intact. Sensory: Sensation is intact. Motor: Weakness present. Comments: Patient has difficulty lifting left leg         SCREENINGS        No data recorded      LAB, EKG AND DIAGNOSTIC RESULTS   Labs:  No results found for this or any previous visit (from the past 12 hour(s)). EKG: Initial EKG interpreted by me.  Not Applicable    Radiologic Studies:  Non-plain film images such as CT, Ultrasound and MRI are read by the radiologist. Plain radiographic images are visualized and preliminarily interpreted by the ED Physician with the following findings: Left hip x-ray shows no acute fracture or joint dislocation   Interpretation per the Radiologist below, if available at the time of this note:  No results found. PROCEDURES   Unless otherwise noted below, none.   Procedures      CRITICAL CARE TIME   None    ED COURSE and DIFFERENTIAL DIAGNOSIS/MDM   CC/HPI Summary, DDx, ED Course, and Reassessment: 65-year-old female history of lumbar radiculopathy with multiple levels of degenerative changes in lumbar spine, patient denies any back surgeries, patient recently 24 hours in the [address] after living in [place] [facility] over the last 3 months, patient states that when she was with family in [place] [facility] they have helped her with mobility but patient states she has not been able to ambulate for the past 3 months using her walker, patient states she is unable to take care of herself and her  is not able to take care of her either, patient was dependent on family members to full-time she was with them over the past 3 months in [place] [facility]    DDx CVA, muscle deconditioning    Disposition Considerations (Tests not done, Shared Decision Making, Pt Expectation of Test or Treatment.):  Considerations for home health care referral, physical therapy referral; patient was offered admission for social reasons and patient declined patient is adamant about being discharged home since she was not getting the narcotic medications she was requesting, patient history of heroin use is on Suboxone therapy, patient left cursing at  who stated that he was not getting to pay her $100 a week for her heroin addiction    Records Reviewed (source and summary of external notes): Prior medical records and Nursing notes    Vitals:    Vitals:    03/19/23 1018   BP: 139/69   Pulse: 60   Resp: 18   Temp: 97.7 °F (36.5 °C)   SpO2: 100%             Patient was given the following medications:  Medications - No data to display    CONSULTS: (Who and What was discussed)  None     Social Determinants affecting Dx or Tx: Patient lacks support at home or lives alone. FINAL IMPRESSION   No diagnosis found. DISPOSITION/PLAN       Discharge Note: The patient is stable for discharge home. The signs, symptoms, diagnosis, and discharge instructions have been discussed, understanding conveyed, and agreed upon. The patient is to follow up as recommended or return to ER should their symptoms worsen. PATIENT REFERRED TO:  Follow-up Information    None           DISCHARGE MEDICATIONS:  Current Discharge Medication List            DISCONTINUED MEDICATIONS:  Current Discharge Medication List          I am the Primary Clinician of Record: Meliton Bacon MD (electronically signed)    (Please note that parts of this dictation were completed with voice recognition software. Quite often unanticipated grammatical, syntax, homophones, and other interpretive errors are inadvertently transcribed by the computer software. Please disregards these errors.  Please excuse any errors that have escaped final proofreading.)

## 2023-04-03 ENCOUNTER — HOSPITAL ENCOUNTER (EMERGENCY)
Age: 58
Discharge: HOME | End: 2023-04-03
Attending: EMERGENCY MEDICINE
Payer: MEDICAID

## 2023-04-03 DIAGNOSIS — F41.0 PANIC ATTACK: Primary | ICD-10-CM

## 2023-04-03 DIAGNOSIS — M48.02 CERVICAL STENOSIS OF SPINAL CANAL: ICD-10-CM

## 2023-04-03 LAB
ALBUMIN SERPL-MCNC: 3.4 G/DL (ref 3.5–5)
ALBUMIN/GLOB SERPL: 0.8 (ref 1.1–2.2)
ALP SERPL-CCNC: 74 U/L (ref 45–117)
ALT SERPL-CCNC: 30 U/L (ref 12–78)
ANION GAP SERPL CALC-SCNC: 5 MMOL/L (ref 5–15)
AST SERPL-CCNC: 23 U/L (ref 15–37)
BASOPHILS # BLD: 0 K/UL (ref 0–0.1)
BASOPHILS NFR BLD: 0 % (ref 0–1)
BILIRUB SERPL-MCNC: 0.4 MG/DL (ref 0.2–1)
BNP SERPL-MCNC: <5 PG/ML (ref 0–125)
BUN SERPL-MCNC: 17 MG/DL (ref 6–20)
BUN/CREAT SERPL: 28 (ref 12–20)
CALCIUM SERPL-MCNC: 9.2 MG/DL (ref 8.5–10.1)
CHLORIDE SERPL-SCNC: 105 MMOL/L (ref 97–108)
CO2 SERPL-SCNC: 33 MMOL/L (ref 21–32)
CREAT SERPL-MCNC: 0.6 MG/DL (ref 0.55–1.02)
DIFFERENTIAL METHOD BLD: NORMAL
EOSINOPHIL # BLD: 0.2 K/UL (ref 0–0.4)
EOSINOPHIL NFR BLD: 3 % (ref 0–7)
ERYTHROCYTE [DISTWIDTH] IN BLOOD BY AUTOMATED COUNT: 13.3 % (ref 11.5–14.5)
GLOBULIN SER CALC-MCNC: 4.1 G/DL (ref 2–4)
GLUCOSE SERPL-MCNC: 102 MG/DL (ref 65–100)
HCT VFR BLD AUTO: 40.7 % (ref 35–47)
HGB BLD-MCNC: 13.2 G/DL (ref 11.5–16)
IMM GRANULOCYTES # BLD AUTO: 0 K/UL (ref 0–0.04)
IMM GRANULOCYTES NFR BLD AUTO: 0 % (ref 0–0.5)
LYMPHOCYTES # BLD: 2 K/UL (ref 0.8–3.5)
LYMPHOCYTES NFR BLD: 29 % (ref 12–49)
MCH RBC QN AUTO: 30.9 PG (ref 26–34)
MCHC RBC AUTO-ENTMCNC: 32.4 G/DL (ref 30–36.5)
MCV RBC AUTO: 95.3 FL (ref 80–99)
MONOCYTES # BLD: 0.5 K/UL (ref 0–1)
MONOCYTES NFR BLD: 7 % (ref 5–13)
NEUTS SEG # BLD: 4.2 K/UL (ref 1.8–8)
NEUTS SEG NFR BLD: 61 % (ref 32–75)
NRBC # BLD: 0 K/UL (ref 0–0.01)
NRBC BLD-RTO: 0 PER 100 WBC
PLATELET # BLD AUTO: 216 K/UL (ref 150–400)
PMV BLD AUTO: 11 FL (ref 8.9–12.9)
POTASSIUM SERPL-SCNC: 3.7 MMOL/L (ref 3.5–5.1)
PROT SERPL-MCNC: 7.5 G/DL (ref 6.4–8.2)
RBC # BLD AUTO: 4.27 M/UL (ref 3.8–5.2)
SODIUM SERPL-SCNC: 143 MMOL/L (ref 136–145)
WBC # BLD AUTO: 7 K/UL (ref 3.6–11)

## 2023-04-03 PROCEDURE — 99284 EMERGENCY DEPT VISIT MOD MDM: CPT

## 2023-04-03 PROCEDURE — 36415 COLL VENOUS BLD VENIPUNCTURE: CPT

## 2023-04-03 PROCEDURE — 93005 ELECTROCARDIOGRAM TRACING: CPT

## 2023-04-03 PROCEDURE — 85025 COMPLETE CBC W/AUTO DIFF WBC: CPT

## 2023-04-03 PROCEDURE — 80053 COMPREHEN METABOLIC PANEL: CPT

## 2023-04-03 PROCEDURE — 83880 ASSAY OF NATRIURETIC PEPTIDE: CPT

## 2023-04-03 PROCEDURE — 74011250637 HC RX REV CODE- 250/637: Performed by: EMERGENCY MEDICINE

## 2023-04-03 RX ORDER — LORAZEPAM 1 MG/1
1 TABLET ORAL
Status: COMPLETED | OUTPATIENT
Start: 2023-04-03 | End: 2023-04-03

## 2023-04-03 RX ADMIN — LORAZEPAM 1 MG: 1 TABLET ORAL at 12:20

## 2023-04-03 NOTE — ED TRIAGE NOTES
Pt arrived to ED via EMS complaining of anxiety x today. Pt states she got into an argument w/ her  and friend. Pt states she began feeling SOB. Pt reports \"I am just tired\". Pt denies SI. Pt also complaining of bilateral lower leg swelling or numbness x 3 months. Pt states \"my whole right side of my body is gone\". Pt reports she was seen at [facility] and [facility] for these symptoms previous.

## 2023-04-03 NOTE — ED NOTES
Discharge instructions were given to the patient by Pancho Palacios RN. The patient left the Emergency Department ambulatory, alert and oriented and in no acute distress with 0 prescriptions. The patient was encouraged to call or return to the ED for worsening issues or problems and was encouraged to schedule a follow up appointment for continuing care. The patient verbalized understanding of discharge instructions and prescriptions, all questions were answered. The patient has no further concerns at this time.

## 2023-04-03 NOTE — ED NOTES

## 2023-04-03 NOTE — ED PROVIDER NOTES
[facility] EMERGENCY DEPT  EMERGENCY DEPARTMENT ENCOUNTER       Pt Name: Yudelka Matias  MRN: [id number]  [place] 1965  Date of evaluation: 4/3/2023  Provider: Nel Hill DO   PCP: Vel Og MD  Note Started: 11:53 AM 4/3/23     CHIEF COMPLAINT       Chief Complaint   Patient presents with    Anxiety    Leg Pain               HISTORY OF PRESENT ILLNESS: 1 or more elements      History From: Patient, History limited by: none     Yudelka Matias is a 61 y.o. female with past medical history significant forPolysubstance use, schizoaffective disorder hypertension. She presents to the emergency department complaining of anxiety. She also complains of chronic pain and weakness in the left arm and left leg and reports difficulty with ambulation and performing ADLs due to weakness in the left arm and left leg. She says has been there for several months. She had similar complaints when she was admitted in January, had an MRI at that time which showed congenital canal stenosis and stenosis from degenerative disc disease. She has not followed up with any neurosurgeon or spine surgeon. Please See MDM for Additional Details of the HPI/PMH  Nursing Notes were all reviewed and agreed with or any disagreements were addressed in the HPI. REVIEW OF SYSTEMS        Positives and Pertinent negatives as per HPI.     PAST HISTORY     Past Medical History:  Past Medical History:   Diagnosis Date    Acid reflux     Arthritis     KNEE, BACK    COPD     Depression     Other ill-defined conditions(799.89)     Chronic Back Pain    Psychiatric disorder     bipolar       Past Surgical History:  Past Surgical History:   Procedure Laterality Date    COLONOSCOPY N/A 11/19/2019    COLONOSCOPY performed by Mp Hahn MD at [facility] MAIN OR    HX CHOLECYSTECTOMY      HX GYN      oophorectomy, left    HX ORTHOPAEDIC Left CHILD    Fracture repair/knee    HX ORTHOPAEDIC  TEEN    REPAIR FX RIGHT FOREARM    [place] UNLISTED PROCEDURE ABDOMEN PERITONEUM & OMENTUM      Cholecystectomy       Family History:  Family History   Problem Relation Age of Onset    Hypertension Mother     Diabetes Mother     Anesth Problems Neg Hx        Social History:  Social History     Tobacco Use    Smoking status: Every Day     Packs/day: 0.50     Years: 46.00     Pack years: 23.00     Types: Cigarettes    Smokeless tobacco: Never   Substance Use Topics    Alcohol use: Yes     Comment: 2 DRINKS PER MONTH    Drug use: Yes     Types: Heroin     Comment: PT. DENIES 8-15-18       Allergies: Allergies   Allergen Reactions    Latex Rash    Sulfa (Sulfonamide Antibiotics) Rash       CURRENT MEDICATIONS      Discharge Medication List as of 4/3/2023 12:42 PM        CONTINUE these medications which have NOT CHANGED    Details   cloNIDine HCL (CATAPRES) 0.1 mg tablet Take 1 Tablet by mouth every six (6) hours as needed for Opioid Withdrawal. Indications: symptoms from stopping treatment with opioid drugs, No Print, Disp-1 Tablet, R-0      methocarbamoL (ROBAXIN) 500 mg tablet Take 1 Tablet by mouth four (4) times daily as needed for Muscle Spasm(s). Indications: muscle spasm, No Print, Disp-1 Tablet, R-0      sertraline (ZOLOFT) 100 mg tablet Take 2 Tablets by mouth daily. Indications: anxiousness associated with depression, No Print, Disp-1 Tablet, R-0             SCREENINGS               No data recorded         PHYSICAL EXAM      ED Triage Vitals [04/03/23 1140]   ED Encounter Vitals Group      BP (!) 154/124      Pulse (Heart Rate) 87      Resp Rate 16      Temp 98 °F (36.7 °C)      Temp src       O2 Sat (%) 98 %      Weight 170 lb      Height 5' 5\"        Physical Exam  Vitals and nursing note reviewed. Constitutional:       Appearance: She is well-developed. She is obese. Comments: Anxious, tearful   HENT:      Head: Normocephalic and atraumatic. Eyes:      General:         Right eye: No discharge. Left eye: No discharge. Conjunctiva/sclera: Conjunctivae normal.      Pupils: Pupils are equal, round, and reactive to light. Neck:      Trachea: No tracheal deviation. Cardiovascular:      Rate and Rhythm: Normal rate and regular rhythm. Heart sounds: Normal heart sounds. No murmur heard. Pulmonary:      Effort: Pulmonary effort is normal. No respiratory distress. Breath sounds: Normal breath sounds. No wheezing or rales. Abdominal:      General: Bowel sounds are normal.      Palpations: Abdomen is soft. Tenderness: There is no abdominal tenderness. There is no guarding or rebound. Musculoskeletal:         General: No deformity. Normal range of motion. Cervical back: Normal range of motion and neck supple. Right lower leg: Edema present. Left lower leg: Edema present. Comments: Patient has some decreased strength in the left arm and left leg. This is detailed in other prior notes, appears to be more chronic. Skin:     General: Skin is warm and dry. Findings: No erythema or rash. Neurological:      Mental Status: She is alert and oriented to person, place, and time. Psychiatric:      Comments: Tearful on exam, appears anxious.   Denies SI or HI        DIAGNOSTIC RESULTS   LABS:     Recent Results (from the past 12 hour(s))   EKG, 12 LEAD, INITIAL    Collection Time: 04/03/23 12:05 PM   Result Value Ref Range    Ventricular Rate 57 BPM    Atrial Rate 57 BPM    P-R Interval 130 ms    QRS Duration 80 ms    Q-T Interval 408 ms    QTC Calculation (Bezet) 397 ms    Calculated P Axis 61 degrees    Calculated R Axis 30 degrees    Calculated T Axis 57 degrees    Diagnosis       Sinus bradycardia  Otherwise normal ECG  When compared with ECG of 28-JAN-2023 13:35,  T wave inversion no longer evident in Inferior leads     CBC WITH AUTOMATED DIFF    Collection Time: 04/03/23 12:06 PM   Result Value Ref Range    WBC 7.0 3.6 - 11.0 K/uL    RBC 4.27 3.80 - 5.20 M/uL    HGB 13.2 11.5 - 16.0 g/dL    HCT 40.7 35.0 - 47.0 %    MCV 95.3 80.0 - 99.0 FL    MCH 30.9 26.0 - 34.0 PG    MCHC 32.4 30.0 - 36.5 g/dL    RDW 13.3 11.5 - 14.5 %    PLATELET 435 274 - 373 K/uL    MPV 11.0 8.9 - 12.9 FL    NRBC 0.0 0  WBC    ABSOLUTE NRBC 0.00 0.00 - 0.01 K/uL    NEUTROPHILS 61 32 - 75 %    LYMPHOCYTES 29 12 - 49 %    MONOCYTES 7 5 - 13 %    EOSINOPHILS 3 0 - 7 %    BASOPHILS 0 0 - 1 %    IMMATURE GRANULOCYTES 0 0.0 - 0.5 %    ABS. NEUTROPHILS 4.2 1.8 - 8.0 K/UL    ABS. LYMPHOCYTES 2.0 0.8 - 3.5 K/UL    ABS. MONOCYTES 0.5 0.0 - 1.0 K/UL    ABS. EOSINOPHILS 0.2 0.0 - 0.4 K/UL    ABS. BASOPHILS 0.0 0.0 - 0.1 K/UL    ABS. IMM. GRANS. 0.0 0.00 - 0.04 K/UL    DF AUTOMATED     METABOLIC PANEL, COMPREHENSIVE    Collection Time: 04/03/23 12:06 PM   Result Value Ref Range    Sodium 143 136 - 145 mmol/L    Potassium 3.7 3.5 - 5.1 mmol/L    Chloride 105 97 - 108 mmol/L    CO2 33 (H) 21 - 32 mmol/L    Anion gap 5 5 - 15 mmol/L    Glucose 102 (H) 65 - 100 mg/dL    BUN 17 6 - 20 MG/DL    Creatinine 0.60 0.55 - 1.02 MG/DL    BUN/Creatinine ratio 28 (H) 12 - 20      eGFR >60 >60 ml/min/1.73m2    Calcium 9.2 8.5 - 10.1 MG/DL    Bilirubin, total 0.4 0.2 - 1.0 MG/DL    ALT (SGPT) 30 12 - 78 U/L    AST (SGOT) 23 15 - 37 U/L    Alk. phosphatase 74 45 - 117 U/L    Protein, total 7.5 6.4 - 8.2 g/dL    Albumin 3.4 (L) 3.5 - 5.0 g/dL    Globulin 4.1 (H) 2.0 - 4.0 g/dL    A-G Ratio 0.8 (L) 1.1 - 2.2     NT-PRO BNP    Collection Time: 04/03/23 12:06 PM   Result Value Ref Range    NT pro-BNP <5 0 - 125 PG/ML        EKG: If performed, independent interpretation documented below in the MDM section     RADIOLOGY:  Non-plain film images such as CT, Ultrasound and MRI are read by the radiologist. Plain radiographic images are visualized and preliminarily interpreted by the ED Provider with the findings documented in the MDM section. Interpretation per the Radiologist below, if available at the time of this note:     No results found.       PROCEDURES Unless otherwise noted below, none  Procedures     CRITICAL CARE TIME       EMERGENCY DEPARTMENT COURSE and DIFFERENTIAL DIAGNOSIS/MDM   Vitals:    Vitals:    04/03/23 1140 04/03/23 1145 04/03/23 1300   BP: (!) 154/124 (!) 134/53 (!) 140/85   Pulse: 87  75   Resp: 16  16   Temp: 98 °F (36.7 °C)     SpO2: 98%  100%   Weight: 77.1 kg (170 lb)     Height: 5' 5\" (1.651 m)          Patient was given the following medications:  Medications   LORazepam (ATIVAN) tablet 1 mg (1 mg Oral Given 4/3/23 1220)       Medical Decision Making  Patient evaluated found to be in no acute cardiopulmonary distress. She is very anxious, she is tearful on exam.  Most of her complaints are related to her chronic left-sided weakness and pain. She was diagnosed with cervical spinal canal stenosis in January via MRI. Does not appear that she has followed up with any neurosurgeons or spine surgeons. She is mostly just anxious at this time. We will give some anxiolysis. She complains of lower extremity edema, will check basic labs for evidence of congestive heart failure, renal dysfunction. Anticipate likely discharge to home, she is not suicidal or homicidal, needs follow-up with spine surgery    Amount and/or Complexity of Data Reviewed  External Data Reviewed: notes. Details: Patient has congenitally narrowed canal with superimposed multilevel degenerative changes seen on MRI on 1/10/2023. Most significant at C3-C4 with severe narrowing of the canal and compression of the cord. There is seem to be associated cord edema at that time  Labs: ordered. ECG/medicine tests: ordered. Risk  Prescription drug management. FINAL IMPRESSION     1. Panic attack    2. Cervical stenosis of spinal canal          DISPOSITION/PLAN   Chichi Villarreal's  results have been reviewed with her. She has been counseled regarding her diagnosis, treatment, and plan.   She verbally conveys understanding and agreement of the signs, symptoms, diagnosis, treatment and prognosis and additionally agrees to follow up as discussed. She also agrees with the care-plan and conveys that all of her questions have been answered. I have also provided discharge instructions for her that include: educational information regarding their diagnosis and treatment, and list of reasons why they would want to return to the ED prior to their follow-up appointment, should her condition change. CLINICAL IMPRESSION    Discharge Note: The patient is stable for discharge home. The signs, symptoms, diagnosis, and discharge instructions have been discussed, understanding conveyed, and agreed upon. The patient is to follow up as recommended or return to ER should their symptoms worsen. PATIENT REFERRED TO:  Follow-up Information       Follow up With Specialties Details Why Contact Info    Nasreen Angulo MD Neurosurgery Schedule an appointment as soon as possible for a visit   [phone] [facility] [address] [facility] [phone]      [facility] EMERGENCY DEPT Emergency Medicine  If symptoms worsen [address]              DISCHARGE MEDICATIONS:  Discharge Medication List as of 4/3/2023 12:42 PM            DISCONTINUED MEDICATIONS:  Discharge Medication List as of 4/3/2023 12:42 PM          I am the Primary Clinician of Record. Reji Perez DO (electronically signed)    (Please note that parts of this dictation were completed with voice recognition software. Quite often unanticipated grammatical, syntax, homophones, and other interpretive errors are inadvertently transcribed by the computer software. Please disregards these errors.  Please excuse any errors that have escaped final proofreading.)

## 2023-04-04 LAB
ATRIAL RATE: 57 BPM
CALCULATED P AXIS, ECG09: 61 DEGREES
CALCULATED R AXIS, ECG10: 30 DEGREES
CALCULATED T AXIS, ECG11: 57 DEGREES
DIAGNOSIS, 93000: NORMAL
P-R INTERVAL, ECG05: 130 MS
Q-T INTERVAL, ECG07: 408 MS
QRS DURATION, ECG06: 80 MS
QTC CALCULATION (BEZET), ECG08: 397 MS
VENTRICULAR RATE, ECG03: 57 BPM

## 2023-08-08 ENCOUNTER — HOSPITAL ENCOUNTER (EMERGENCY)
Facility: HOSPITAL | Age: 58
Discharge: HOME | End: 2023-08-08
Attending: EMERGENCY MEDICINE
Payer: COMMERCIAL

## 2023-08-08 VITALS
HEART RATE: 57 BPM | HEIGHT: 66 IN | DIASTOLIC BLOOD PRESSURE: 65 MMHG | RESPIRATION RATE: 18 BRPM | WEIGHT: 182 LBS | OXYGEN SATURATION: 96 % | TEMPERATURE: 98 F | SYSTOLIC BLOOD PRESSURE: 137 MMHG | BODY MASS INDEX: 29.25 KG/M2

## 2023-08-08 DIAGNOSIS — M62.838 SPASM OF MUSCLE: Primary | ICD-10-CM

## 2023-08-08 PROCEDURE — 6360000002 HC RX W HCPCS: Performed by: EMERGENCY MEDICINE

## 2023-08-08 PROCEDURE — 99284 EMERGENCY DEPT VISIT MOD MDM: CPT

## 2023-08-08 PROCEDURE — 96372 THER/PROPH/DIAG INJ SC/IM: CPT

## 2023-08-08 RX ORDER — KETOROLAC TROMETHAMINE 30 MG/ML
30 INJECTION, SOLUTION INTRAMUSCULAR; INTRAVENOUS
Status: COMPLETED | OUTPATIENT
Start: 2023-08-08 | End: 2023-08-08

## 2023-08-08 RX ORDER — LORAZEPAM 2 MG/ML
1 INJECTION INTRAMUSCULAR ONCE
Status: COMPLETED | OUTPATIENT
Start: 2023-08-08 | End: 2023-08-08

## 2023-08-08 RX ORDER — METHOCARBAMOL 750 MG/1
750 TABLET, FILM COATED ORAL 4 TIMES DAILY
Qty: 40 TABLET | Refills: 0 | Status: SHIPPED | OUTPATIENT
Start: 2023-08-08 | End: 2023-08-10

## 2023-08-08 RX ORDER — LORAZEPAM 2 MG/ML
1 INJECTION INTRAMUSCULAR ONCE
Status: DISCONTINUED | OUTPATIENT
Start: 2023-08-08 | End: 2023-08-08

## 2023-08-08 RX ADMIN — KETOROLAC TROMETHAMINE 30 MG: 30 INJECTION, SOLUTION INTRAMUSCULAR; INTRAVENOUS at 22:13

## 2023-08-08 RX ADMIN — LORAZEPAM 1 MG: 2 INJECTION INTRAMUSCULAR; INTRAVENOUS at 22:13

## 2023-08-08 ASSESSMENT — PAIN - FUNCTIONAL ASSESSMENT: PAIN_FUNCTIONAL_ASSESSMENT: 0-10

## 2023-08-08 ASSESSMENT — LIFESTYLE VARIABLES
HOW OFTEN DO YOU HAVE A DRINK CONTAINING ALCOHOL: NEVER
HOW MANY STANDARD DRINKS CONTAINING ALCOHOL DO YOU HAVE ON A TYPICAL DAY: PATIENT DOES NOT DRINK

## 2023-08-08 ASSESSMENT — PAIN DESCRIPTION - PAIN TYPE: TYPE: ACUTE PAIN

## 2023-08-08 ASSESSMENT — ENCOUNTER SYMPTOMS: RESPIRATORY NEGATIVE: 1

## 2023-08-08 ASSESSMENT — PAIN SCALES - GENERAL: PAINLEVEL_OUTOF10: 10

## 2023-08-09 NOTE — ED NOTES
Patient discharge to [facility], requested medicaid cab. Arranged same, patient aware probable extended ETA. Trip #[id number].      Lupis Max RN  08/08/23 6820

## 2023-08-09 NOTE — ED TRIAGE NOTES
Pt presents to ED from home per EMS for eval bilateral leg and foot pain. Pt states this has been going on x 2 days. States she had neck surgery approx 2 years ago. Denies fall.

## 2023-08-09 NOTE — ED NOTES
[facility] EMERGENCY DEPT  EMERGENCY DEPARTMENT ENCOUNTER      Pt Name: Suyapa Canales  MRN: [id number]  [address] [facility], [place] 1965  Date of evaluation: 8/8/2023  Provider: Bianka Carver. MD Bruce    [address]       Chief Complaint   Patient presents with    Leg Pain         HISTORY OF PRESENT ILLNESS   (Location/Symptom, Timing/Onset, Context/Setting, Quality, Duration, Modifying Factors, Severity)  Note limiting factors. Suyapa Canales is a 60 y.o. female with PMH significant for polysubstance abuse, cervical stenosis and surgery, myelopathy, presents to the emergency department with complaint of waxing and waning muscle cramping in her lower extremities which started last night. Pain is rated 10/10. She could not sleep. Patient reports previous history of muscle spasms treated with Robaxin. Nursing Notes were reviewed. REVIEW OF SYSTEMS    (2-9 systems for level 4, 10 or more for level 5)     Review of Systems   Constitutional: Negative. HENT: Negative. Respiratory: Negative. Cardiovascular: Negative. Musculoskeletal:  Positive for myalgias. Bilateral lower extremity muscle spasm   Hematological: Negative. Except as noted above the remainder of the review of systems was reviewed and negative.        PAST MEDICAL HISTORY     Past Medical History:   Diagnosis Date    Acid reflux     Arthritis     KNEE, BACK    Depression     Other ill-defined conditions(979.89)     Chronic Back Pain    Psychiatric disorder     bipolar         SURGICAL HISTORY       Past Surgical History:   Procedure Laterality Date    CERVICAL SPINE SURGERY      CHOLECYSTECTOMY      COLONOSCOPY N/A 11/19/2019    COLONOSCOPY performed by David Hollingsworth MD at [address]      oophorectomy, left    ORTHOPEDIC SURGERY  TEEN    REPAIR FX RIGHT FOREARM    ORTHOPEDIC SURGERY Left CHILD    Fracture repair/knee    [place] UNLISTED PROCEDURE ABDOMEN PERITONEUM & OMENTUM      Cholecystectomy         CURRENT

## 2023-08-10 ENCOUNTER — HOSPITAL ENCOUNTER (EMERGENCY)
Facility: HOSPITAL | Age: 58
Discharge: HOME | End: 2023-08-10
Attending: EMERGENCY MEDICINE
Payer: COMMERCIAL

## 2023-08-10 VITALS
WEIGHT: 180.5 LBS | RESPIRATION RATE: 16 BRPM | BODY MASS INDEX: 29.01 KG/M2 | HEIGHT: 66 IN | HEART RATE: 59 BPM | DIASTOLIC BLOOD PRESSURE: 61 MMHG | OXYGEN SATURATION: 100 % | SYSTOLIC BLOOD PRESSURE: 136 MMHG | TEMPERATURE: 97.4 F

## 2023-08-10 DIAGNOSIS — M62.838 SPASM OF MUSCLE: Primary | ICD-10-CM

## 2023-08-10 PROCEDURE — 99284 EMERGENCY DEPT VISIT MOD MDM: CPT

## 2023-08-10 PROCEDURE — 6360000002 HC RX W HCPCS: Performed by: EMERGENCY MEDICINE

## 2023-08-10 PROCEDURE — 96372 THER/PROPH/DIAG INJ SC/IM: CPT

## 2023-08-10 RX ORDER — LORAZEPAM 2 MG/ML
2 INJECTION INTRAMUSCULAR ONCE
Status: COMPLETED | OUTPATIENT
Start: 2023-08-10 | End: 2023-08-10

## 2023-08-10 RX ORDER — METHOCARBAMOL 750 MG/1
750 TABLET, FILM COATED ORAL 4 TIMES DAILY
Qty: 40 TABLET | Refills: 0 | Status: SHIPPED | OUTPATIENT
Start: 2023-08-10 | End: 2023-08-20

## 2023-08-10 RX ORDER — KETOROLAC TROMETHAMINE 30 MG/ML
30 INJECTION, SOLUTION INTRAMUSCULAR; INTRAVENOUS
Status: COMPLETED | OUTPATIENT
Start: 2023-08-10 | End: 2023-08-10

## 2023-08-10 RX ADMIN — LORAZEPAM 2 MG: 2 INJECTION INTRAMUSCULAR; INTRAVENOUS at 02:36

## 2023-08-10 RX ADMIN — KETOROLAC TROMETHAMINE 30 MG: 30 INJECTION, SOLUTION INTRAMUSCULAR; INTRAVENOUS at 02:54

## 2023-08-10 ASSESSMENT — ENCOUNTER SYMPTOMS
BACK PAIN: 1
RESPIRATORY NEGATIVE: 1
GASTROINTESTINAL NEGATIVE: 1

## 2023-08-10 ASSESSMENT — PAIN SCALES - GENERAL: PAINLEVEL_OUTOF10: 10

## 2023-08-10 ASSESSMENT — PAIN - FUNCTIONAL ASSESSMENT: PAIN_FUNCTIONAL_ASSESSMENT: 0-10

## 2023-08-10 ASSESSMENT — PAIN DESCRIPTION - ORIENTATION: ORIENTATION: RIGHT;LEFT

## 2023-08-10 ASSESSMENT — PAIN DESCRIPTION - LOCATION: LOCATION: LEG;BACK

## 2023-08-10 NOTE — ED TRIAGE NOTES
Pt brought in by [facility]. Pt was reported to have been seen here yesterday for the same complaint. Pt is complaining of muscle spasm and cramps in both legs, pain rated a 10/10. Pt was called in [address] [place] [address] yesterday upon discharge but did not  the prescription. Pt states she had spinal surgery on May 11th. Pt denies any other symptoms at this time.

## 2023-08-10 NOTE — ED NOTES
Pt given discharge instructions and verbalized understanding.       Skyla Dhaliwal RN  08/10/23 4932

## 2023-08-10 NOTE — ED NOTES
[facility] [phone] to arrange transportation back to pts residence from ED.  Trip # [id number]      Rocky Guerra RN  08/10/23 6057

## 2023-08-10 NOTE — ED NOTES
[facility] EMERGENCY DEPT  EMERGENCY DEPARTMENT ENCOUNTER      Pt Name: Riana Priest  MRN: [id number]  [address] [facility] 1965  Date of evaluation: 8/10/2023  Provider: Ling Barrera MD    [address]       Chief Complaint   Patient presents with    Back Pain     Leg cramps         HISTORY OF PRESENT ILLNESS   (Location/Symptom, Timing/Onset, Context/Setting, Quality, Duration, Modifying Factors, Severity)  Note limiting factors. Riana Priest is a 60 y.o. female who presents to the emergency department with complaint of cramping pain in her low back and both legs. She was seen here yesterday for the same problem. History of known lumbar radiculopathy. Pain is rated 10/10 in severity. Nursing Notes were reviewed. REVIEW OF SYSTEMS    (2-9 systems for level 4, 10 or more for level 5)     Review of Systems   Constitutional: Negative. HENT: Negative. Respiratory: Negative. Cardiovascular: Negative. Gastrointestinal: Negative. Genitourinary: Negative. Musculoskeletal:  Positive for back pain. Neurological: Negative. Hematological: Negative. Except as noted above the remainder of the review of systems was reviewed and negative.        PAST MEDICAL HISTORY     Past Medical History:   Diagnosis Date    Acid reflux     Arthritis     KNEE, BACK    Depression     Other ill-defined conditions(299.89)     Chronic Back Pain    Psychiatric disorder     bipolar         SURGICAL HISTORY       Past Surgical History:   Procedure Laterality Date    CERVICAL SPINE SURGERY      CHOLECYSTECTOMY      COLONOSCOPY N/A 11/19/2019    COLONOSCOPY performed by Angelica Posey MD at [address]      oophorectomy, left    ORTHOPEDIC SURGERY  TEEN    REPAIR FX RIGHT FOREARM    ORTHOPEDIC SURGERY Left CHILD    Fracture repair/knee    [place] UNLISTED PROCEDURE ABDOMEN PERITONEUM & OMENTUM      Cholecystectomy         CURRENT MEDICATIONS       Previous Medications    CLONIDINE (CATAPRES) 0.1

## 2023-10-12 ENCOUNTER — HOSPITAL ENCOUNTER (EMERGENCY)
Facility: HOSPITAL | Age: 58
Discharge: HOME | End: 2023-10-12
Attending: EMERGENCY MEDICINE
Payer: COMMERCIAL

## 2023-10-12 VITALS
BODY MASS INDEX: 28.93 KG/M2 | TEMPERATURE: 97.8 F | OXYGEN SATURATION: 100 % | DIASTOLIC BLOOD PRESSURE: 99 MMHG | RESPIRATION RATE: 18 BRPM | SYSTOLIC BLOOD PRESSURE: 147 MMHG | HEART RATE: 83 BPM | HEIGHT: 66 IN | WEIGHT: 180 LBS

## 2023-10-12 DIAGNOSIS — N39.0 URINARY TRACT INFECTION WITHOUT HEMATURIA, SITE UNSPECIFIED: ICD-10-CM

## 2023-10-12 DIAGNOSIS — F41.9 ANXIETY: Primary | ICD-10-CM

## 2023-10-12 DIAGNOSIS — E87.6 HYPOKALEMIA: ICD-10-CM

## 2023-10-12 DIAGNOSIS — M62.838 SPASM OF MUSCLE: ICD-10-CM

## 2023-10-12 LAB
ALBUMIN SERPL-MCNC: 3.8 G/DL (ref 3.5–5)
ALBUMIN/GLOB SERPL: 0.9 (ref 1.1–2.2)
ALP SERPL-CCNC: 78 U/L (ref 45–117)
ALT SERPL-CCNC: 29 U/L (ref 12–78)
AMPHET UR QL SCN: NEGATIVE
ANION GAP SERPL CALC-SCNC: 9 MMOL/L (ref 5–15)
APPEARANCE UR: CLEAR
AST SERPL W P-5'-P-CCNC: 27 U/L (ref 15–37)
BACTERIA URNS QL MICRO: NEGATIVE /HPF
BARBITURATES UR QL SCN: NEGATIVE
BASOPHILS # BLD: 0 K/UL (ref 0–0.1)
BASOPHILS NFR BLD: 0 % (ref 0–1)
BENZODIAZ UR QL: NEGATIVE
BILIRUB SERPL-MCNC: 0.4 MG/DL (ref 0.2–1)
BILIRUB UR QL: NEGATIVE
BUN SERPL-MCNC: 15 MG/DL (ref 6–20)
BUN/CREAT SERPL: 18 (ref 12–20)
CA-I BLD-MCNC: 9.3 MG/DL (ref 8.5–10.1)
CANNABINOIDS UR QL SCN: NEGATIVE
CHLORIDE SERPL-SCNC: 106 MMOL/L (ref 97–108)
CO2 SERPL-SCNC: 26 MMOL/L (ref 21–32)
COCAINE UR QL SCN: POSITIVE
COLOR UR: ABNORMAL
CREAT SERPL-MCNC: 0.84 MG/DL (ref 0.55–1.02)
DEPRECATED S PYO AG THROAT QL EIA: NEGATIVE
DIFFERENTIAL METHOD BLD: ABNORMAL
EOSINOPHIL # BLD: 0 K/UL (ref 0–0.4)
EOSINOPHIL NFR BLD: 0 % (ref 0–7)
EPITH CASTS URNS QL MICRO: ABNORMAL /LPF
ERYTHROCYTE [DISTWIDTH] IN BLOOD BY AUTOMATED COUNT: 15.1 % (ref 11.5–14.5)
ETHANOL SERPL-MCNC: <10 MG/DL (ref 0–0.08)
FLUAV RNA SPEC QL NAA+PROBE: NOT DETECTED
FLUBV RNA SPEC QL NAA+PROBE: NOT DETECTED
GLOBULIN SER CALC-MCNC: 4.3 G/DL (ref 2–4)
GLUCOSE SERPL-MCNC: 105 MG/DL (ref 65–100)
GLUCOSE UR STRIP.AUTO-MCNC: NEGATIVE MG/DL
HCT VFR BLD AUTO: 42.7 % (ref 35–47)
HGB BLD-MCNC: 13.8 G/DL (ref 11.5–16)
HGB UR QL STRIP: ABNORMAL
IMM GRANULOCYTES # BLD AUTO: 0.1 K/UL (ref 0–0.04)
IMM GRANULOCYTES NFR BLD AUTO: 1 % (ref 0–0.5)
KETONES UR QL STRIP.AUTO: NEGATIVE MG/DL
LEUKOCYTE ESTERASE UR QL STRIP.AUTO: ABNORMAL
LYMPHOCYTES # BLD: 2.4 K/UL (ref 0.8–3.5)
LYMPHOCYTES NFR BLD: 30 % (ref 12–49)
Lab: ABNORMAL
MCH RBC QN AUTO: 29.7 PG (ref 26–34)
MCHC RBC AUTO-ENTMCNC: 32.3 G/DL (ref 30–36.5)
MCV RBC AUTO: 92 FL (ref 80–99)
MDMA URINE: NEGATIVE
METHADONE UR QL: NEGATIVE
MONOCYTES # BLD: 0.5 K/UL (ref 0–1)
MONOCYTES NFR BLD: 6 % (ref 5–13)
NEUTS SEG # BLD: 5.1 K/UL (ref 1.8–8)
NEUTS SEG NFR BLD: 63 % (ref 32–75)
NITRITE UR QL STRIP.AUTO: NEGATIVE
NRBC # BLD: 0 K/UL (ref 0–0.01)
NRBC BLD-RTO: 0 PER 100 WBC
OPIATES UR QL: NEGATIVE
PCP UR QL: NEGATIVE
PH UR STRIP: 6 (ref 5–8)
PLATELET # BLD AUTO: 277 K/UL (ref 150–400)
PMV BLD AUTO: 10.3 FL (ref 8.9–12.9)
POTASSIUM SERPL-SCNC: 3.3 MMOL/L (ref 3.5–5.1)
PROT SERPL-MCNC: 8.1 G/DL (ref 6.4–8.2)
PROT UR STRIP-MCNC: NEGATIVE MG/DL
RBC # BLD AUTO: 4.64 M/UL (ref 3.8–5.2)
RBC #/AREA URNS HPF: ABNORMAL /HPF (ref 0–3)
SARS-COV-2 RNA RESP QL NAA+PROBE: NOT DETECTED
SODIUM SERPL-SCNC: 141 MMOL/L (ref 136–145)
SP GR UR REFRACTOMETRY: <1.005 (ref 1–1.03)
URINE CULTURE IF INDICATED: ABNORMAL
UROBILINOGEN UR QL STRIP.AUTO: 0.2 EU/DL (ref 0.2–1)
WBC # BLD AUTO: 8.1 K/UL (ref 3.6–11)
WBC URNS QL MICRO: ABNORMAL /HPF (ref 0–5)

## 2023-10-12 PROCEDURE — 80307 DRUG TEST PRSMV CHEM ANLYZR: CPT

## 2023-10-12 PROCEDURE — 99283 EMERGENCY DEPT VISIT LOW MDM: CPT

## 2023-10-12 PROCEDURE — 81001 URINALYSIS AUTO W/SCOPE: CPT

## 2023-10-12 PROCEDURE — 87636 SARSCOV2 & INF A&B AMP PRB: CPT

## 2023-10-12 PROCEDURE — 87070 CULTURE OTHR SPECIMN AEROBIC: CPT

## 2023-10-12 PROCEDURE — 6370000000 HC RX 637 (ALT 250 FOR IP): Performed by: STUDENT IN AN ORGANIZED HEALTH CARE EDUCATION/TRAINING PROGRAM

## 2023-10-12 PROCEDURE — 80053 COMPREHEN METABOLIC PANEL: CPT

## 2023-10-12 PROCEDURE — 87880 STREP A ASSAY W/OPTIC: CPT

## 2023-10-12 PROCEDURE — 85025 COMPLETE CBC W/AUTO DIFF WBC: CPT

## 2023-10-12 PROCEDURE — 82077 ASSAY SPEC XCP UR&BREATH IA: CPT

## 2023-10-12 RX ORDER — POTASSIUM CHLORIDE 20 MEQ/1
40 TABLET, EXTENDED RELEASE ORAL ONCE
Status: COMPLETED | OUTPATIENT
Start: 2023-10-12 | End: 2023-10-12

## 2023-10-12 RX ORDER — LORAZEPAM 1 MG/1
1 TABLET ORAL ONCE
Status: COMPLETED | OUTPATIENT
Start: 2023-10-12 | End: 2023-10-12

## 2023-10-12 RX ORDER — CEPHALEXIN 500 MG/1
500 CAPSULE ORAL 2 TIMES DAILY
Qty: 14 CAPSULE | Refills: 0 | Status: SHIPPED | OUTPATIENT
Start: 2023-10-12 | End: 2023-10-19

## 2023-10-12 RX ADMIN — POTASSIUM CHLORIDE 40 MEQ: 1500 TABLET, EXTENDED RELEASE ORAL at 08:50

## 2023-10-12 RX ADMIN — LORAZEPAM 1 MG: 1 TABLET ORAL at 08:50

## 2023-10-12 ASSESSMENT — PAIN DESCRIPTION - ORIENTATION: ORIENTATION: RIGHT;LEFT

## 2023-10-12 ASSESSMENT — PAIN - FUNCTIONAL ASSESSMENT: PAIN_FUNCTIONAL_ASSESSMENT: 0-10

## 2023-10-12 ASSESSMENT — PAIN DESCRIPTION - LOCATION: LOCATION: LEG

## 2023-10-12 ASSESSMENT — ENCOUNTER SYMPTOMS
GASTROINTESTINAL NEGATIVE: 1
SHORTNESS OF BREATH: 0
EYES NEGATIVE: 1
WHEEZING: 0
COUGH: 1

## 2023-10-12 ASSESSMENT — PAIN SCALES - GENERAL: PAINLEVEL_OUTOF10: 10

## 2023-10-12 NOTE — ED NOTES
[facility] EMERGENCY DEPT  EMERGENCY DEPARTMENT ENCOUNTER      Pt Name: Fracisco Lu  MRN: [id number]  [address] [facility] 1965  Date of evaluation: 10/12/2023  Provider: Tavia Healy. MD Bruce    CHIEF COMPLAINT       Chief Complaint   Patient presents with    Anxiety         HISTORY OF PRESENT ILLNESS   (Location/Symptom, Timing/Onset, Context/Setting, Quality, Duration, Modifying Factors, Severity)  Note limiting factors. Fracisco Lu is a 60 y.o. female who presents to the emergency department with multiple complaints, such as anxiety, insomnia, leg spasms, dry cough for 2 days. She says her anxiety medication is not working at all and wants to have her medications changed. She also complains of skin lesions on the right leg for 1 month. She is depressed but denies suicidal or self-harm ideas. Nursing Notes were reviewed. REVIEW OF SYSTEMS    (2-9 systems for level 4, 10 or more for level 5)     Review of Systems   Constitutional: Negative. HENT: Negative. Eyes: Negative. Respiratory:  Positive for cough. Negative for shortness of breath and wheezing. Cardiovascular: Negative. Gastrointestinal: Negative. Genitourinary: Negative. Musculoskeletal:         Leg spasms   Skin:  Positive for wound. Neurological: Negative. Psychiatric/Behavioral:  Positive for dysphoric mood and sleep disturbance. Negative for suicidal ideas. The patient is nervous/anxious. Except as noted above the remainder of the review of systems was reviewed and negative.        PAST MEDICAL HISTORY     Past Medical History:   Diagnosis Date    Acid reflux     Arthritis     KNEE, BACK    Depression     Other ill-defined conditions(799.89)     Chronic Back Pain    Psychiatric disorder     bipolar         SURGICAL HISTORY       Past Surgical History:   Procedure Laterality Date    CERVICAL SPINE SURGERY      CHOLECYSTECTOMY      COLONOSCOPY N/A 11/19/2019    COLONOSCOPY performed by Penelope Gottlieb MD

## 2023-10-12 NOTE — DISCHARGE INSTRUCTIONS
Benzodiazepines, Urine Negative Negative      Cocaine, Urine Positive (A) Negative      MDMA, Urine Negative Negative      Methadone, Urine Negative Negative      Opiates, Urine Negative Negative      PCP, Urine Negative Negative      THC, TH-Cannabinol, Urine Negative Negative      Comments: PH    CBC with Auto Differential    Collection Time: 10/12/23  7:57 AM   Result Value Ref Range    WBC 8.1 3.6 - 11.0 K/uL    RBC 4.64 3.80 - 5.20 M/uL    Hemoglobin 13.8 11.5 - 16.0 g/dL    Hematocrit 42.7 35.0 - 47.0 %    MCV 92.0 80.0 - 99.0 FL    MCH 29.7 26.0 - 34.0 PG    MCHC 32.3 30.0 - 36.5 g/dL    RDW 15.1 (H) 11.5 - 14.5 %    Platelets 479 122 - 066 K/uL    MPV 10.3 8.9 - 12.9 FL    Nucleated RBCs 0.0 0.0  WBC    nRBC 0.00 0.00 - 0.01 K/uL    Neutrophils % 63 32 - 75 %    Lymphocytes % 30 12 - 49 %    Monocytes % 6 5 - 13 %    Eosinophils % 0 0 - 7 %    Basophils % 0 0 - 1 %    Immature Granulocytes 1 (H) 0 - 0.5 %    Neutrophils Absolute 5.1 1.8 - 8.0 K/UL    Lymphocytes Absolute 2.4 0.8 - 3.5 K/UL    Monocytes Absolute 0.5 0.0 - 1.0 K/UL    Eosinophils Absolute 0.0 0.0 - 0.4 K/UL    Basophils Absolute 0.0 0.0 - 0.1 K/UL    Absolute Immature Granulocyte 0.1 (H) 0.00 - 0.04 K/UL    Differential Type AUTOMATED     CMP    Collection Time: 10/12/23  7:57 AM   Result Value Ref Range    Sodium 141 136 - 145 mmol/L    Potassium 3.3 (L) 3.5 - 5.1 mmol/L    Chloride 106 97 - 108 mmol/L    CO2 26 21 - 32 mmol/L    Anion Gap 9 5 - 15 mmol/L    Glucose 105 (H) 65 - 100 mg/dL    BUN 15 6 - 20 mg/dL    Creatinine 0.84 0.55 - 1.02 mg/dL    Bun/Cre Ratio 18 12 - 20      Est, Glom Filt Rate >60 >60 ml/min/1.73m2    Calcium 9.3 8.5 - 10.1 mg/dL    Total Bilirubin 0.4 0.2 - 1.0 mg/dL    AST 27 15 - 37 U/L    ALT 29 12 - 78 U/L    Alk Phosphatase 78 45 - 117 U/L    Total Protein 8.1 6.4 - 8.2 g/dL    Albumin 3.8 3.5 - 5.0 g/dL    Globulin 4.3 (H) 2.0 - 4.0 g/dL    Albumin/Globulin Ratio 0.9 (L) 1.1 - 2.2     ETOH    Collection

## 2023-10-12 NOTE — ED NOTES
Provided paper copies of mental health community resources for patient.       Sina Da Silva, CARMELA  10/12/23 4102

## 2023-10-12 NOTE — ED TRIAGE NOTES
Voices multiple c/o. Pt reports anxiety, LE muscle spasms and insomnia x 2 days. C/O cough, \"scratchy throat\". Pt report her medications are not helping and wants them changed. Pt noted to be restless during triage. Pt also reports she is developing sores on her legs that started after having neck surgery in June.

## 2023-10-13 LAB
BACTERIA SPEC CULT: NORMAL
Lab: NORMAL

## 2024-03-09 ENCOUNTER — HOSPITAL ENCOUNTER (EMERGENCY)
Facility: HOSPITAL | Age: 59
Discharge: HOME | End: 2024-03-09
Attending: EMERGENCY MEDICINE
Payer: MEDICAID

## 2024-03-09 VITALS
TEMPERATURE: 98.1 F | DIASTOLIC BLOOD PRESSURE: 90 MMHG | RESPIRATION RATE: 19 BRPM | OXYGEN SATURATION: 99 % | HEART RATE: 60 BPM | SYSTOLIC BLOOD PRESSURE: 120 MMHG

## 2024-03-09 DIAGNOSIS — J06.9 ACUTE UPPER RESPIRATORY INFECTION: Primary | ICD-10-CM

## 2024-03-09 LAB
FLUAV RNA SPEC QL NAA+PROBE: NOT DETECTED
FLUBV RNA SPEC QL NAA+PROBE: NOT DETECTED
SARS-COV-2 RNA RESP QL NAA+PROBE: NOT DETECTED

## 2024-03-09 PROCEDURE — 99283 EMERGENCY DEPT VISIT LOW MDM: CPT

## 2024-03-09 PROCEDURE — 6370000000 HC RX 637 (ALT 250 FOR IP): Performed by: EMERGENCY MEDICINE

## 2024-03-09 PROCEDURE — 87636 SARSCOV2 & INF A&B AMP PRB: CPT

## 2024-03-09 RX ORDER — BENZONATATE 100 MG/1
200 CAPSULE ORAL
Status: COMPLETED | OUTPATIENT
Start: 2024-03-09 | End: 2024-03-09

## 2024-03-09 RX ORDER — PREDNISONE 20 MG/1
60 TABLET ORAL
Status: COMPLETED | OUTPATIENT
Start: 2024-03-09 | End: 2024-03-09

## 2024-03-09 RX ORDER — ACETAMINOPHEN 500 MG
500 TABLET ORAL EVERY 6 HOURS PRN
Qty: 40 TABLET | Refills: 1 | Status: SHIPPED | OUTPATIENT
Start: 2024-03-09

## 2024-03-09 RX ORDER — PREDNISONE 20 MG/1
20 TABLET ORAL DAILY
Qty: 5 TABLET | Refills: 0 | Status: SHIPPED | OUTPATIENT
Start: 2024-03-09 | End: 2024-03-14

## 2024-03-09 RX ORDER — BENZONATATE 100 MG/1
100 CAPSULE ORAL 3 TIMES DAILY PRN
Qty: 15 CAPSULE | Refills: 0 | Status: SHIPPED | OUTPATIENT
Start: 2024-03-09 | End: 2024-03-14

## 2024-03-09 RX ADMIN — PREDNISONE 60 MG: 20 TABLET ORAL at 11:03

## 2024-03-09 RX ADMIN — BENZONATATE 200 MG: 100 CAPSULE ORAL at 11:03

## 2024-03-09 ASSESSMENT — PAIN - FUNCTIONAL ASSESSMENT: PAIN_FUNCTIONAL_ASSESSMENT: 0-10

## 2024-03-09 ASSESSMENT — PAIN SCALES - GENERAL: PAINLEVEL_OUTOF10: 8

## 2024-03-09 NOTE — ED PROVIDER NOTES
[facility] EMERGENCY DEPT  EMERGENCY DEPARTMENT HISTORY AND PHYSICAL EXAM      Date: 3/9/2024  Patient Name: Debo Keys  MRN: [id number]  YOB: 1965  Date of evaluation: 3/9/2024  Provider: Misty Hackett MD   Note Started: 10:11 AM EST 3/9/24    HISTORY OF PRESENT ILLNESS     Chief Complaint   Patient presents with    Cough       History Provided By: Patient    HPI: Debo Keys is a 58 y.o. female     PAST MEDICAL HISTORY   Past Medical History:  Past Medical History:   Diagnosis Date    Acid reflux     Arthritis     KNEE, BACK    Depression     Other ill-defined conditions(799.89)     Chronic Back Pain    Psychiatric disorder     bipolar       Past Surgical History:  Past Surgical History:   Procedure Laterality Date    CERVICAL SPINE SURGERY      CHOLECYSTECTOMY      COLONOSCOPY N/A 11/19/2019    COLONOSCOPY performed by Shayne Noriega MD at [facility] MAIN OR    GYN      oophorectomy, left    ORTHOPEDIC SURGERY  TEEN    REPAIR FX RIGHT FOREARM    ORTHOPEDIC SURGERY Left CHILD    Fracture repair/knee    SC UNLISTED PROCEDURE ABDOMEN PERITONEUM & OMENTUM      Cholecystectomy       Family History:  Family History   Problem Relation Age of Onset    Hypertension Mother     Diabetes Mother     Anesth Problems Neg Hx        Social History:  Social History     Tobacco Use    Smoking status: Every Day     Current packs/day: 0.50     Types: Cigarettes    Smokeless tobacco: Never   Substance Use Topics    Alcohol use: Yes    Drug use: Yes     Types: Heroin       Allergies:  Allergies   Allergen Reactions    Latex Rash    Sulfa Antibiotics Rash       PCP: Albaro Souza MD    Current Meds:   No current facility-administered medications for this encounter.     Current Outpatient Medications   Medication Sig Dispense Refill    HYDROXYZINE HCL PO Take by mouth      cloNIDine (CATAPRES) 0.1 MG tablet Take by mouth every 6 hours as needed      sertraline (ZOLOFT) 100 MG tablet Take by mouth daily    Expectation of Test or Treatment.): See ED Course    Patient was given the following medications:  Medications - No data to display    CONSULTS: (Who and What was discussed)  None     Social Determinants affecting Dx or Tx: None    Smoking Cessation: Smoking Cessation Counseling  Discussed the risks of smoking tobacco products and the long term sequelae of tobacco use with the patient such as increased risk of heart attack, stroke, peripheral artery disease and cancer. The patient verbalized their understanding and were provided resources if asked. Counseled patient for approximately 3 minutes.     PROCEDURES   Unless otherwise noted above, none.  Procedures      CRITICAL CARE TIME   Patient does not meet Critical Care Time, 0 minutes    FINAL IMPRESSION   No diagnosis found.      DISPOSITION/PLAN   DISPOSITION      Discharge Note: The patient is stable for discharge home. The signs, symptoms, diagnosis, and discharge instructions have been discussed, understanding conveyed, and agreed upon. The patient is to follow up as recommended or return to ER should their symptoms worsen.      PATIENT REFERRED TO:  No follow-up provider specified.      DISCHARGE MEDICATIONS:     Medication List        ASK your doctor about these medications      cloNIDine 0.1 MG tablet  Commonly known as: CATAPRES     HYDROXYZINE HCL PO     sertraline 100 MG tablet  Commonly known as: ZOLOFT                DISCONTINUED MEDICATIONS:  Current Discharge Medication List          I am the Primary Clinician of Record: Misty Hackett MD (electronically signed)    (Please note that parts of this dictation were completed with voice recognition software. Quite often unanticipated grammatical, syntax, homophones, and other interpretive errors are inadvertently transcribed by the computer software. Please disregards these errors. Please excuse any errors that have escaped final proofreading.)     Misty Hackett MD  03/09/24 1346

## 2024-03-09 NOTE — ED TRIAGE NOTES
Pt arrives with complaint cough with green sputum, chills, congestion, and aches rated 8/10. Pt states symptoms began last night after grocery shopping.

## 2024-07-16 ENCOUNTER — HOSPITAL ENCOUNTER (EMERGENCY)
Facility: HOSPITAL | Age: 59
Discharge: HOME | End: 2024-07-16
Attending: EMERGENCY MEDICINE
Payer: MEDICAID

## 2024-07-16 VITALS
HEIGHT: 66 IN | TEMPERATURE: 98.5 F | HEART RATE: 74 BPM | WEIGHT: 185 LBS | OXYGEN SATURATION: 97 % | DIASTOLIC BLOOD PRESSURE: 76 MMHG | RESPIRATION RATE: 21 BRPM | BODY MASS INDEX: 29.73 KG/M2 | SYSTOLIC BLOOD PRESSURE: 147 MMHG

## 2024-07-16 DIAGNOSIS — F11.93 OPIATE WITHDRAWAL (HCC): Primary | ICD-10-CM

## 2024-07-16 LAB
ALBUMIN SERPL-MCNC: 3.6 G/DL (ref 3.5–5)
ALBUMIN/GLOB SERPL: 0.9 (ref 1.1–2.2)
ALP SERPL-CCNC: 92 U/L (ref 45–117)
ALT SERPL-CCNC: 25 U/L (ref 12–78)
ANION GAP SERPL CALC-SCNC: 11 MMOL/L (ref 5–15)
AST SERPL W P-5'-P-CCNC: 14 U/L (ref 15–37)
BASOPHILS # BLD: 0 K/UL (ref 0–0.1)
BASOPHILS NFR BLD: 0 % (ref 0–1)
BILIRUB SERPL-MCNC: 0.3 MG/DL (ref 0.2–1)
BUN SERPL-MCNC: 12 MG/DL (ref 6–20)
BUN/CREAT SERPL: 18 (ref 12–20)
CA-I BLD-MCNC: 9.5 MG/DL (ref 8.5–10.1)
CHLORIDE SERPL-SCNC: 109 MMOL/L (ref 97–108)
CK SERPL-CCNC: 195 U/L (ref 26–192)
CO2 SERPL-SCNC: 25 MMOL/L (ref 21–32)
CREAT SERPL-MCNC: 0.66 MG/DL (ref 0.55–1.02)
DIFFERENTIAL METHOD BLD: NORMAL
EOSINOPHIL # BLD: 0.1 K/UL (ref 0–0.4)
EOSINOPHIL NFR BLD: 1 % (ref 0–7)
ERYTHROCYTE [DISTWIDTH] IN BLOOD BY AUTOMATED COUNT: 14.5 % (ref 11.5–14.5)
GLOBULIN SER CALC-MCNC: 4.2 G/DL (ref 2–4)
GLUCOSE SERPL-MCNC: 81 MG/DL (ref 65–100)
HCT VFR BLD AUTO: 42.8 % (ref 35–47)
HGB BLD-MCNC: 14.1 G/DL (ref 11.5–16)
IMM GRANULOCYTES # BLD AUTO: 0 K/UL (ref 0–0.04)
IMM GRANULOCYTES NFR BLD AUTO: 0 % (ref 0–0.5)
LYMPHOCYTES # BLD: 2.5 K/UL (ref 0.8–3.5)
LYMPHOCYTES NFR BLD: 36 % (ref 12–49)
MCH RBC QN AUTO: 30.5 PG (ref 26–34)
MCHC RBC AUTO-ENTMCNC: 32.9 G/DL (ref 30–36.5)
MCV RBC AUTO: 92.6 FL (ref 80–99)
MONOCYTES # BLD: 0.5 K/UL (ref 0–1)
MONOCYTES NFR BLD: 8 % (ref 5–13)
NEUTS SEG # BLD: 3.8 K/UL (ref 1.8–8)
NEUTS SEG NFR BLD: 55 % (ref 32–75)
NRBC # BLD: 0 K/UL (ref 0–0.01)
NRBC BLD-RTO: 0 PER 100 WBC
PLATELET # BLD AUTO: 297 K/UL (ref 150–400)
PMV BLD AUTO: 10.4 FL (ref 8.9–12.9)
POTASSIUM SERPL-SCNC: 3.3 MMOL/L (ref 3.5–5.1)
PROT SERPL-MCNC: 7.8 G/DL (ref 6.4–8.2)
RBC # BLD AUTO: 4.62 M/UL (ref 3.8–5.2)
SODIUM SERPL-SCNC: 145 MMOL/L (ref 136–145)
TROPONIN I SERPL HS-MCNC: 6 NG/L (ref 0–51)
WBC # BLD AUTO: 7 K/UL (ref 3.6–11)

## 2024-07-16 PROCEDURE — 36415 COLL VENOUS BLD VENIPUNCTURE: CPT

## 2024-07-16 PROCEDURE — 80053 COMPREHEN METABOLIC PANEL: CPT

## 2024-07-16 PROCEDURE — 93005 ELECTROCARDIOGRAM TRACING: CPT | Performed by: EMERGENCY MEDICINE

## 2024-07-16 PROCEDURE — 82550 ASSAY OF CK (CPK): CPT

## 2024-07-16 PROCEDURE — 99284 EMERGENCY DEPT VISIT MOD MDM: CPT

## 2024-07-16 PROCEDURE — 85025 COMPLETE CBC W/AUTO DIFF WBC: CPT

## 2024-07-16 PROCEDURE — 84484 ASSAY OF TROPONIN QUANT: CPT

## 2024-07-16 RX ORDER — ONDANSETRON 2 MG/ML
4 INJECTION INTRAMUSCULAR; INTRAVENOUS ONCE
Status: DISCONTINUED | OUTPATIENT
Start: 2024-07-16 | End: 2024-07-16

## 2024-07-16 RX ORDER — KETOROLAC TROMETHAMINE 30 MG/ML
30 INJECTION, SOLUTION INTRAMUSCULAR; INTRAVENOUS
Status: DISCONTINUED | OUTPATIENT
Start: 2024-07-16 | End: 2024-07-16

## 2024-07-16 NOTE — ED NOTES
Ambulatory out of ER with Juan due to calling ride herself because she was ready to go and unable to wait for meds and had removed IV per self;.

## 2024-07-16 NOTE — ED NOTES
Pt has been ambulating outside x3. Informed patient that she could not leave the ER since she has an IV in her arm. Pt moved closer to nursing station and MD patiño.

## 2024-07-16 NOTE — ED NOTES
Pt pulled IV per self. States \" I've been waiting for my meds.\" Pt has been ambulatory out of room x3 and went outside to smoke after multiple times of being told to stay in room and not to leave out.

## 2024-07-16 NOTE — ED NOTES
Attempted to collect urine specimen; patient unable to provide at this time. Provided patient with a bottle of water to help and a blanket. No further needs at this time; call bell within reach.

## 2024-07-16 NOTE — ED PROVIDER NOTES
[facility] EMERGENCY DEPT  EMERGENCY DEPARTMENT HISTORY AND PHYSICAL EXAM      Date: 7/16/2024  Patient Name: Debo Keys  MRN: [id number]  YOB: 1965  Date of evaluation: 7/16/2024  Provider: Misty Hackett MD   Note Started: 3:00 PM EDT 7/16/24    HISTORY OF PRESENT ILLNESS     Chief Complaint   Patient presents with    Chest Pain       History Provided By: Patient    HPI: Debo Keys is a 59 y.o. female     PAST MEDICAL HISTORY   Past Medical History:  Past Medical History:   Diagnosis Date    Acid reflux     Arthritis     KNEE, BACK    Depression     Other ill-defined conditions(799.89)     Chronic Back Pain    Psychiatric disorder     bipolar       Past Surgical History:  Past Surgical History:   Procedure Laterality Date    CERVICAL SPINE SURGERY      CHOLECYSTECTOMY      COLONOSCOPY N/A 11/19/2019    COLONOSCOPY performed by Shayne Noriega MD at [facility] MAIN OR    GYN      oophorectomy, left    ORTHOPEDIC SURGERY  TEEN    REPAIR FX RIGHT FOREARM    ORTHOPEDIC SURGERY Left CHILD    Fracture repair/knee    UT UNLISTED PROCEDURE ABDOMEN PERITONEUM & OMENTUM      Cholecystectomy       Family History:  Family History   Problem Relation Age of Onset    Hypertension Mother     Diabetes Mother     Anesth Problems Neg Hx        Social History:  Social History     Tobacco Use    Smoking status: Every Day     Current packs/day: 0.50     Types: Cigarettes    Smokeless tobacco: Never   Substance Use Topics    Alcohol use: Yes    Drug use: Yes     Types: Heroin       Allergies:  Allergies   Allergen Reactions    Latex Rash    Sulfa Antibiotics Rash       PCP: Albaro Souza MD    Current Meds:   No current facility-administered medications for this encounter.     Current Outpatient Medications   Medication Sig Dispense Refill    acetaminophen (TYLENOL) 500 MG tablet Take 1 tablet by mouth every 6 hours as needed for Pain 40 tablet 1    HYDROXYZINE HCL PO Take by mouth      cloNIDine

## 2024-07-16 NOTE — ED TRIAGE NOTES
Pt reports CP, generalized pain and is noted restless. PT reports she is going through heroine withdraws, Pt reports she last used Saturday and her ride to get more this AM fell through causing her unable to get more. Pt voices multiple complaints at this time. PT is alert and oriented. PT reports she has not had any of her prescribed medications in two weeks due to being out

## 2024-07-16 NOTE — ED NOTES
[facility] called due to pt exiting ED multiple times to smoke with IV in place, Cussing staff out for medications and pulling out her own IV.

## 2024-07-16 NOTE — ED NOTES
PT noted to be outside the ambulance bay sitting on her walker smoking a cigarette, Pt exited ED without notifying staff, Security called to inquire if pt will return to exam room for treatment.

## 2024-07-17 LAB
EKG ATRIAL RATE: 75 BPM
EKG DIAGNOSIS: NORMAL
EKG P AXIS: 75 DEGREES
EKG P-R INTERVAL: 129 MS
EKG Q-T INTERVAL: 484 MS
EKG QRS DURATION: 87 MS
EKG QTC CALCULATION (BAZETT): 537 MS
EKG R AXIS: 40 DEGREES
EKG T AXIS: 22 DEGREES
EKG VENTRICULAR RATE: 74 BPM

## 2024-08-11 ENCOUNTER — HOSPITAL ENCOUNTER (EMERGENCY)
Facility: HOSPITAL | Age: 59
Discharge: HOME | End: 2024-08-11
Attending: EMERGENCY MEDICINE
Payer: MEDICAID

## 2024-08-11 VITALS
TEMPERATURE: 97.4 F | WEIGHT: 190 LBS | DIASTOLIC BLOOD PRESSURE: 75 MMHG | RESPIRATION RATE: 20 BRPM | OXYGEN SATURATION: 100 % | BODY MASS INDEX: 30.53 KG/M2 | HEART RATE: 60 BPM | SYSTOLIC BLOOD PRESSURE: 128 MMHG | HEIGHT: 66 IN

## 2024-08-11 DIAGNOSIS — M54.32 BILATERAL SCIATICA: ICD-10-CM

## 2024-08-11 DIAGNOSIS — M54.31 BILATERAL SCIATICA: ICD-10-CM

## 2024-08-11 DIAGNOSIS — F11.93 OPIOID WITHDRAWAL (HCC): ICD-10-CM

## 2024-08-11 DIAGNOSIS — M51.37 DDD (DEGENERATIVE DISC DISEASE), LUMBOSACRAL: Primary | ICD-10-CM

## 2024-08-11 PROCEDURE — 96374 THER/PROPH/DIAG INJ IV PUSH: CPT

## 2024-08-11 PROCEDURE — 6360000002 HC RX W HCPCS: Performed by: EMERGENCY MEDICINE

## 2024-08-11 PROCEDURE — 96361 HYDRATE IV INFUSION ADD-ON: CPT

## 2024-08-11 PROCEDURE — 6370000000 HC RX 637 (ALT 250 FOR IP): Performed by: EMERGENCY MEDICINE

## 2024-08-11 PROCEDURE — 99284 EMERGENCY DEPT VISIT MOD MDM: CPT

## 2024-08-11 PROCEDURE — 2580000003 HC RX 258: Performed by: EMERGENCY MEDICINE

## 2024-08-11 PROCEDURE — 96375 TX/PRO/DX INJ NEW DRUG ADDON: CPT

## 2024-08-11 RX ORDER — CLONIDINE HYDROCHLORIDE 0.1 MG/1
0.1 TABLET ORAL
Status: DISCONTINUED | OUTPATIENT
Start: 2024-08-11 | End: 2024-08-11 | Stop reason: HOSPADM

## 2024-08-11 RX ORDER — ONDANSETRON 2 MG/ML
4 INJECTION INTRAMUSCULAR; INTRAVENOUS
Status: COMPLETED | OUTPATIENT
Start: 2024-08-11 | End: 2024-08-11

## 2024-08-11 RX ORDER — 0.9 % SODIUM CHLORIDE 0.9 %
1000 INTRAVENOUS SOLUTION INTRAVENOUS ONCE
Status: COMPLETED | OUTPATIENT
Start: 2024-08-11 | End: 2024-08-11

## 2024-08-11 RX ORDER — IBUPROFEN 800 MG/1
800 TABLET ORAL EVERY 8 HOURS PRN
Qty: 30 TABLET | Refills: 0 | Status: SHIPPED | OUTPATIENT
Start: 2024-08-11

## 2024-08-11 RX ORDER — ONDANSETRON 4 MG/1
4 TABLET, ORALLY DISINTEGRATING ORAL EVERY 8 HOURS PRN
Qty: 20 TABLET | Refills: 0 | Status: SHIPPED | OUTPATIENT
Start: 2024-08-11

## 2024-08-11 RX ORDER — KETOROLAC TROMETHAMINE 30 MG/ML
30 INJECTION, SOLUTION INTRAMUSCULAR; INTRAVENOUS
Status: COMPLETED | OUTPATIENT
Start: 2024-08-11 | End: 2024-08-11

## 2024-08-11 RX ORDER — LORAZEPAM 1 MG/1
1 TABLET ORAL ONCE
Status: COMPLETED | OUTPATIENT
Start: 2024-08-11 | End: 2024-08-11

## 2024-08-11 RX ADMIN — SODIUM CHLORIDE 1000 ML: 9 INJECTION, SOLUTION INTRAVENOUS at 07:58

## 2024-08-11 RX ADMIN — ONDANSETRON 4 MG: 2 INJECTION INTRAMUSCULAR; INTRAVENOUS at 07:56

## 2024-08-11 RX ADMIN — KETOROLAC TROMETHAMINE 30 MG: 30 INJECTION, SOLUTION INTRAMUSCULAR at 07:57

## 2024-08-11 RX ADMIN — LORAZEPAM 1 MG: 1 TABLET ORAL at 08:27

## 2024-08-11 ASSESSMENT — PAIN SCALES - GENERAL
PAINLEVEL_OUTOF10: 10
PAINLEVEL_OUTOF10: 10

## 2024-08-11 ASSESSMENT — PAIN DESCRIPTION - LOCATION
LOCATION: BACK;LEG
LOCATION: BACK

## 2024-08-11 ASSESSMENT — PAIN - FUNCTIONAL ASSESSMENT: PAIN_FUNCTIONAL_ASSESSMENT: 0-10

## 2024-08-11 ASSESSMENT — PAIN DESCRIPTION - ORIENTATION: ORIENTATION: RIGHT;LEFT

## 2024-08-11 ASSESSMENT — PAIN DESCRIPTION - DESCRIPTORS: DESCRIPTORS: SHARP;THROBBING

## 2024-08-11 NOTE — ED NOTES
Pt explains that she took an Uber from [place] to go to a family reunion.  Pt explains that she was staying at a hotel for the family reunion.  Pt asked that we set up a medicaid ride to take her back to [place].  Warned pt that medicaid might decline because her destination was an hour away.  Ride was declined by medicaid.  This RN explained that her ride was declined by her insurance.  Pt explained that her original plan was to use a medicaid ride to get back to [place].  Attempted to explain that is not what medicaid transportation is for.  Suggested that pt use a bus to go back to the hotel.  Pt is calling  to see about getting back to [place].

## 2024-08-11 NOTE — ED NOTES
Pt presents ambulatory to ED complaining of lower back pain. Pt reports having lower back pain x 2 days that radiates down to bilateral legs. Pt stated she uses heroin, last use was 2 days ago and now she feels like she is withdrawing. Pt is alert and oriented x 4, RR even and unlabored, skin is warm and dry. Assesment completed and pt updated on plan of care.       Emergency Department Nursing Plan of Care       The Nursing Plan of Care is developed from the Nursing assessment and Emergency Department Attending provider initial evaluation.  The plan of care may be reviewed in the “ED Provider note”.    The Plan of Care was developed with the following considerations:   Patient / Family readiness to learn indicated by:verbalized understanding  Persons(s) to be included in education: patient  Barriers to Learning/Limitations:None    Signed     RODY BALBUENA RN    8/11/2024   6:50 AM

## 2024-08-11 NOTE — ED TRIAGE NOTES
Pt presents to ED with back pain. Pt reports she has had back pain for 2 days. He states the pain radiates down her back to her legs for past 2 days. Pt aslo reports she is withdrawing from heroinS She  last used 2 days ago.

## 2024-08-11 NOTE — ED PROVIDER NOTES
[facility] EMERGENCY DEPT  EMERGENCY DEPARTMENT ENCOUNTER    Patient Name: Debo Keys  MRN: [id number]  YOB: 1965  Provider: Chaim Sharp MD  PCP: Albaro Souza MD  Time/Date of evaluation: 7:28 AM EDT on 8/11/24    History of Presenting Illness     Chief Complaint   Patient presents with    Back Pain     History Provided by: Patient   History is limited by: Nothing    HISTORY (Narrative):   Debo Keys is a 59 y.o. female with a PMHX of GERD, osteoarthritis, major depressive disorder, bipolar disorder, and heroin abuse  who presents to the emergency department (room 7) by POV C/O bilateral low back pain that radiates down her buttocks into her bilateral legs for the past 2 days.  Patient denies any bowel/bladder incontinence, saddle anesthesia, or leg weakness.    Nursing Notes were all reviewed and agreed with or any disagreements were addressed in the HPI.    Past History     PAST MEDICAL HISTORY:  Past Medical History:   Diagnosis Date    Acid reflux     Arthritis     KNEE, BACK    Depression     Other ill-defined conditions(799.89)     Chronic Back Pain    Psychiatric disorder     bipolar       PAST SURGICAL HISTORY:  Past Surgical History:   Procedure Laterality Date    CERVICAL SPINE SURGERY      CHOLECYSTECTOMY      COLONOSCOPY N/A 11/19/2019    COLONOSCOPY performed by Shayne Noriega MD at [facility] MAIN OR    GYN      oophorectomy, left    ORTHOPEDIC SURGERY  TEEN    REPAIR FX RIGHT FOREARM    ORTHOPEDIC SURGERY Left CHILD    Fracture repair/knee    CA UNLISTED PROCEDURE ABDOMEN PERITONEUM & OMENTUM      Cholecystectomy       FAMILY HISTORY:  Family History   Problem Relation Age of Onset    Hypertension Mother     Diabetes Mother     Anesth Problems Neg Hx        SOCIAL HISTORY:  Social History     Tobacco Use    Smoking status: Every Day     Current packs/day: 0.50     Types: Cigarettes    Smokeless tobacco: Never   Substance Use Topics    Alcohol use: Yes    Drug use:  MEDICATIONS:    Current Discharge Medication List          (Please note that parts of this dictation were completed with voice recognition software. Quite often unanticipated grammatical, syntax, homophones, and other interpretive errors are inadvertently transcribed by the computer software. Please disregards these errors. Please excuse any errors that have escaped final proofreading.)    I am the Primary Clinician of Record.   Chaim Sharp MD  (Electronically signed)           Chaim Sharp MD  08/18/24 4220

## 2024-08-11 NOTE — ED NOTES
Discharge instructions were given to the patient by CARMELA Ornelas.     The patient left the Emergency Department alert and oriented and in no acute distress with 2 prescriptions. The patient was encouraged to call or return to the ED for worsening issues or problems and was encouraged to schedule a follow up appointment for continuing care.     Ambulation assessment completed before discharge.  Pt left Emergency Department at expected ambulatory status with no ortho devices  Ortho device education: none    The patient verbalized understanding of discharge instructions and prescriptions, all questions were answered. The patient has no further concerns at this time.

## (undated) DEVICE — HOOK LOCK LATEX FREE ELASTIC BANDAGE 3INX5YD

## (undated) DEVICE — Device

## (undated) DEVICE — SUT ETHLN 4-0 18IN PS2 BLK --

## (undated) DEVICE — NON-REM POLYHESIVE PATIENT RETURN ELECTRODE: Brand: VALLEYLAB

## (undated) DEVICE — PADDING CST 4IN STERILE --

## (undated) DEVICE — ELECTRODE,RADIOTRANSLUCENT,FOAM,5PK: Brand: MEDLINE

## (undated) DEVICE — NEEDLE HYPO 18GA L1.5IN PNK S STL HUB POLYPR SHLD REG BVL

## (undated) DEVICE — TRAP SUC MUCOUS 70ML -- MEDICHOICE MEDLINE

## (undated) DEVICE — SOL ANTI-FOG 6ML MEDC -- MEDICHOICE - CONVERT TO 358427

## (undated) DEVICE — TOWEL 4 PLY TISS 19X30 SUE WHT

## (undated) DEVICE — Z DISCONTINUED GLOVE SURG SZ 7.5 L12IN FNGR THK13MIL WHT ISOLEX

## (undated) DEVICE — NEEDLE SCLERO 25GA L240CM OD0.51MM ID0.24MM EXTN L4MM SHTH

## (undated) DEVICE — BANDAGE COMPR 9 FTX4 IN SMOOTH COMFORTABLE SYNTH ESMRK LF

## (undated) DEVICE — CONTAINER SPEC 20 ML LID NEUT BUFF FORMALIN 10 % POLYPR STS

## (undated) DEVICE — DISPOSABLE TOURNIQUET CUFF SINGLE BLADDER, DUAL PORT AND QUICK CONNECT CONNECTOR: Brand: COLOR CUFF

## (undated) DEVICE — SNARE ENDOSCP M L240CM W27MM SHTH DIA2.4MM CHN 2.8MM OVL

## (undated) DEVICE — BIPOLAR FORCEPS CORD: Brand: VALLEYLAB

## (undated) DEVICE — SYR 10ML LUER LOK 1/5ML GRAD --

## (undated) DEVICE — OCCLUSIVE GAUZE STRIP,3% BISMUTH TRIBROMOPHENATE IN PETROLATUM BLEND: Brand: XEROFORM

## (undated) DEVICE — CANNULA CUSH AD W/ 14FT TBG

## (undated) DEVICE — NEEDLE HYPO 25GA L1.5IN BVL ORIENTED ECLIPSE

## (undated) DEVICE — STRAINER CALC --

## (undated) DEVICE — INFECTION CONTROL KIT SYS

## (undated) DEVICE — DRAPE,REIN 53X77,STERILE: Brand: MEDLINE

## (undated) DEVICE — BASIN EMSIS 16OZ G PLAS KID SHP MOLD GRAD FOR ORAL HYG AND

## (undated) DEVICE — TIBURON HAND DRAPE: Brand: CONVERTORS

## (undated) DEVICE — SOLUTION IV 1000ML 0.9% SOD CHL

## (undated) DEVICE — KIT,1200CC CANISTER,3/16"X6' TUBING: Brand: MEDLINE INDUSTRIES, INC.

## (undated) DEVICE — (D)PREP SKN CHLRAPRP APPL 26ML -- CONVERT TO ITEM 371833

## (undated) DEVICE — GAUZE SPONGES,12 PLY: Brand: CURITY

## (undated) DEVICE — STANDARD (U) BLADE ASSEMBLY 1PK: Brand: MICROAIRE®